# Patient Record
Sex: MALE | Race: BLACK OR AFRICAN AMERICAN | NOT HISPANIC OR LATINO | Employment: FULL TIME | URBAN - METROPOLITAN AREA
[De-identification: names, ages, dates, MRNs, and addresses within clinical notes are randomized per-mention and may not be internally consistent; named-entity substitution may affect disease eponyms.]

---

## 2017-06-08 ENCOUNTER — HOSPITAL ENCOUNTER (EMERGENCY)
Facility: HOSPITAL | Age: 42
Discharge: HOME | End: 2017-06-08
Attending: EMERGENCY MEDICINE

## 2017-06-08 VITALS
DIASTOLIC BLOOD PRESSURE: 59 MMHG | SYSTOLIC BLOOD PRESSURE: 114 MMHG | OXYGEN SATURATION: 99 % | TEMPERATURE: 98 F | BODY MASS INDEX: 25.15 KG/M2 | HEIGHT: 74 IN | WEIGHT: 196 LBS | HEART RATE: 80 BPM | RESPIRATION RATE: 20 BRPM

## 2017-06-08 DIAGNOSIS — R11.2 NON-INTRACTABLE VOMITING WITH NAUSEA, UNSPECIFIED VOMITING TYPE: Primary | ICD-10-CM

## 2017-06-08 LAB
ALBUMIN SERPL BCP-MCNC: 4.2 G/DL
ALP SERPL-CCNC: 102 U/L
ALT SERPL W/O P-5'-P-CCNC: 24 U/L
ANION GAP SERPL CALC-SCNC: 11 MMOL/L
AST SERPL-CCNC: 27 U/L
BASOPHILS # BLD AUTO: 0.02 K/UL
BASOPHILS NFR BLD: 0.2 %
BILIRUB SERPL-MCNC: 0.8 MG/DL
BUN SERPL-MCNC: 14 MG/DL
CALCIUM SERPL-MCNC: 9.2 MG/DL
CHLORIDE SERPL-SCNC: 109 MMOL/L
CO2 SERPL-SCNC: 24 MMOL/L
CREAT SERPL-MCNC: 1 MG/DL
DIFFERENTIAL METHOD: ABNORMAL
EOSINOPHIL # BLD AUTO: 0 K/UL
EOSINOPHIL NFR BLD: 0.3 %
ERYTHROCYTE [DISTWIDTH] IN BLOOD BY AUTOMATED COUNT: 13.4 %
EST. GFR  (AFRICAN AMERICAN): >60 ML/MIN/1.73 M^2
EST. GFR  (NON AFRICAN AMERICAN): >60 ML/MIN/1.73 M^2
ETHANOL SERPL-MCNC: <10 MG/DL
GLUCOSE SERPL-MCNC: 111 MG/DL
HCT VFR BLD AUTO: 42.4 %
HGB BLD-MCNC: 14.6 G/DL
LIPASE SERPL-CCNC: 13 U/L
LYMPHOCYTES # BLD AUTO: 1.5 K/UL
LYMPHOCYTES NFR BLD: 16.4 %
MCH RBC QN AUTO: 30.8 PG
MCHC RBC AUTO-ENTMCNC: 34.4 %
MCV RBC AUTO: 90 FL
MONOCYTES # BLD AUTO: 0.6 K/UL
MONOCYTES NFR BLD: 6.7 %
NEUTROPHILS # BLD AUTO: 6.8 K/UL
NEUTROPHILS NFR BLD: 76.4 %
PLATELET # BLD AUTO: 199 K/UL
PMV BLD AUTO: 9.5 FL
POTASSIUM SERPL-SCNC: 3.2 MMOL/L
PROT SERPL-MCNC: 7.6 G/DL
RBC # BLD AUTO: 4.74 M/UL
SODIUM SERPL-SCNC: 144 MMOL/L
WBC # BLD AUTO: 8.86 K/UL

## 2017-06-08 PROCEDURE — 96375 TX/PRO/DX INJ NEW DRUG ADDON: CPT

## 2017-06-08 PROCEDURE — 25000003 PHARM REV CODE 250: Performed by: EMERGENCY MEDICINE

## 2017-06-08 PROCEDURE — 63600175 PHARM REV CODE 636 W HCPCS: Performed by: EMERGENCY MEDICINE

## 2017-06-08 PROCEDURE — 80320 DRUG SCREEN QUANTALCOHOLS: CPT

## 2017-06-08 PROCEDURE — 96372 THER/PROPH/DIAG INJ SC/IM: CPT

## 2017-06-08 PROCEDURE — 96361 HYDRATE IV INFUSION ADD-ON: CPT

## 2017-06-08 PROCEDURE — 83690 ASSAY OF LIPASE: CPT

## 2017-06-08 PROCEDURE — 80053 COMPREHEN METABOLIC PANEL: CPT

## 2017-06-08 PROCEDURE — 99284 EMERGENCY DEPT VISIT MOD MDM: CPT | Mod: 25

## 2017-06-08 PROCEDURE — 85025 COMPLETE CBC W/AUTO DIFF WBC: CPT

## 2017-06-08 PROCEDURE — 96365 THER/PROPH/DIAG IV INF INIT: CPT

## 2017-06-08 RX ORDER — DICYCLOMINE HYDROCHLORIDE 10 MG/ML
20 INJECTION INTRAMUSCULAR
Status: COMPLETED | OUTPATIENT
Start: 2017-06-08 | End: 2017-06-08

## 2017-06-08 RX ORDER — HYDROMORPHONE HYDROCHLORIDE 2 MG/ML
1 INJECTION, SOLUTION INTRAMUSCULAR; INTRAVENOUS; SUBCUTANEOUS
Status: COMPLETED | OUTPATIENT
Start: 2017-06-08 | End: 2017-06-08

## 2017-06-08 RX ORDER — DICYCLOMINE HYDROCHLORIDE 20 MG/1
20 TABLET ORAL 2 TIMES DAILY PRN
Qty: 30 TABLET | Refills: 0 | Status: SHIPPED | OUTPATIENT
Start: 2017-06-08 | End: 2017-12-20

## 2017-06-08 RX ORDER — ONDANSETRON 2 MG/ML
8 INJECTION INTRAMUSCULAR; INTRAVENOUS
Status: COMPLETED | OUTPATIENT
Start: 2017-06-08 | End: 2017-06-08

## 2017-06-08 RX ORDER — PROMETHAZINE HYDROCHLORIDE 25 MG/1
25 TABLET ORAL EVERY 6 HOURS PRN
Qty: 15 TABLET | Refills: 0 | Status: SHIPPED | OUTPATIENT
Start: 2017-06-08 | End: 2017-12-20

## 2017-06-08 RX ORDER — SODIUM CHLORIDE 9 MG/ML
1000 INJECTION, SOLUTION INTRAVENOUS
Status: COMPLETED | OUTPATIENT
Start: 2017-06-08 | End: 2017-06-08

## 2017-06-08 RX ADMIN — PROMETHAZINE HYDROCHLORIDE 25 MG: 25 INJECTION INTRAMUSCULAR; INTRAVENOUS at 06:06

## 2017-06-08 RX ADMIN — ONDANSETRON 8 MG: 2 INJECTION INTRAMUSCULAR; INTRAVENOUS at 04:06

## 2017-06-08 RX ADMIN — DICYCLOMINE HYDROCHLORIDE 20 MG: 10 INJECTION INTRAMUSCULAR at 06:06

## 2017-06-08 RX ADMIN — SODIUM CHLORIDE 1000 ML: 0.9 INJECTION, SOLUTION INTRAVENOUS at 06:06

## 2017-06-08 RX ADMIN — HYDROMORPHONE HYDROCHLORIDE 1 MG: 2 INJECTION INTRAMUSCULAR; INTRAVENOUS; SUBCUTANEOUS at 06:06

## 2017-06-08 RX ADMIN — SODIUM CHLORIDE 1000 ML: 0.9 INJECTION, SOLUTION INTRAVENOUS at 04:06

## 2017-06-08 NOTE — ED PROVIDER NOTES
"Encounter Date: 6/8/2017       History     Chief Complaint   Patient presents with    Emesis     Pt reports vomiting twice in last 2 hours     Review of patient's allergies indicates:  No Known Allergies  Pt states, "I have viral gastroenteritis".  He vomited at least 15 times yesterday and more today.  Still vomiting in ED.  Complains of epigastric abdominal pain.        The history is provided by the patient.   Emesis    This is a new problem. The current episode started yesterday. The problem occurs constantly. The problem has been unchanged. The emesis has an appearance of stomach contents. Associated symptoms include abdominal pain (epigastric) and diarrhea (once). Pertinent negatives include no chills, no cough and no fever.     Past Medical History:   Diagnosis Date    Gastritis      History reviewed. No pertinent surgical history.  Family History   Problem Relation Age of Onset    Arthritis Mother     Hearing loss Father     Crohn's disease Sister      Social History   Substance Use Topics    Smoking status: Current Some Day Smoker     Packs/day: 0.25     Years: 3.00     Types: Cigars    Smokeless tobacco: Not on file    Alcohol use 0.6 oz/week     1 Cans of beer per week      Comment: one beer per month      Review of Systems   Constitutional: Negative for chills and fever.   HENT: Negative for rhinorrhea and sore throat.    Eyes: Negative for redness.   Respiratory: Negative for cough.    Cardiovascular: Negative for chest pain.   Gastrointestinal: Positive for abdominal pain (epigastric), diarrhea (once) and vomiting. Negative for nausea.   Genitourinary: Negative for dysuria.   Musculoskeletal: Negative for back pain.   Skin: Negative for color change.   Neurological: Negative for syncope and weakness.   Psychiatric/Behavioral: The patient is not nervous/anxious.        Physical Exam     Initial Vitals [06/08/17 0430]   BP Pulse Resp Temp SpO2   (!) 145/81 (!) 54 18 98.3 °F (36.8 °C) 98 % "     Physical Exam    Nursing note and vitals reviewed.  Constitutional: Vital signs are normal. He appears well-developed and well-nourished. He is active.  Non-toxic appearance. No distress.   Vomiting on exam   HENT:   Head: Normocephalic and atraumatic.   Eyes: EOM are normal.   Neck: Trachea normal. Neck supple.   Cardiovascular: Normal rate and regular rhythm.   Pulmonary/Chest: Breath sounds normal. No respiratory distress.   Abdominal: Soft. Normal appearance and bowel sounds are normal. He exhibits no distension. There is no tenderness.   Musculoskeletal: Normal range of motion. He exhibits no edema.   Neurological: He is alert.   Skin: Skin is warm, dry and intact.   Psychiatric: He has a normal mood and affect.         ED Course   Procedures  Labs Reviewed   CBC W/ AUTO DIFFERENTIAL - Abnormal; Notable for the following:        Result Value    Gran% 76.4 (*)     Lymph% 16.4 (*)     All other components within normal limits   COMPREHENSIVE METABOLIC PANEL - Abnormal; Notable for the following:     Potassium 3.2 (*)     Glucose 111 (*)     All other components within normal limits   LIPASE   ALCOHOL,MEDICAL (ETHANOL)             Medical Decision Making:   History:   Old Medical Records: I decided to obtain old medical records.  Clinical Tests:   Lab Tests: Reviewed and Ordered  ED Management:  This is an emergent evaluation.  The patient is a 41-year-old male who has been vomiting intractably since yesterday.  He appears very uncomfortable and is actively vomiting on exam.  He has epigastric pain without tenderness.  His vital signs are stable.  There is been no history of fever or chills.  He did have one episode of diarrhea.    An IV is established and he was treated with IV fluids and Zofran.    He was still vomiting and complaining of pain.  Therefore, we'll IV fluids, Dilaudid, Phenergan, and Bentyl were ordered.  The patient is stable at this time.    1641 (late entry)--the patient fell completely  recovered at the end of his stay.  He was found to be hypokalemic.  He can replace the diet.  There are no lab abnormalities otherwise.  He was discharged with prescription for Phenergan and Bentyl for his vomiting illness.                   ED Course     Clinical Impression:   The encounter diagnosis was Non-intractable vomiting with nausea, unspecified vomiting type.    Disposition:   Disposition: Discharged  Condition: Stable       Basim Bang MD  06/08/17 8386

## 2017-06-08 NOTE — ED NOTES
"Upon entering room, observed patient lying on left side, significant other at bedside, IV phenergan completed and stopped at his time.  Patient stated that this nausea was "a lil bit better", denies any pain or dicomfort at this time.  Will continue to monitor.  "

## 2017-06-08 NOTE — ED TRIAGE NOTES
Pt states that he started vomiting around 10 after drinking a cup of absolute vodka. Pt c/o of 5/10 epigastric abdominal pain, diarrhea. Pt denies HA, fever, blurred vision, chest pain.

## 2017-06-08 NOTE — ED NOTES
Report received from JETT Recio. Pt is laying on left side with spouse at bedside. Vital taken. Fluids infusing.

## 2017-12-20 ENCOUNTER — HOSPITAL ENCOUNTER (EMERGENCY)
Facility: HOSPITAL | Age: 42
Discharge: HOME | End: 2017-12-20
Attending: EMERGENCY MEDICINE

## 2017-12-20 VITALS
HEIGHT: 74 IN | BODY MASS INDEX: 25.67 KG/M2 | OXYGEN SATURATION: 96 % | DIASTOLIC BLOOD PRESSURE: 98 MMHG | RESPIRATION RATE: 18 BRPM | SYSTOLIC BLOOD PRESSURE: 160 MMHG | HEART RATE: 89 BPM | TEMPERATURE: 100 F | WEIGHT: 200 LBS

## 2017-12-20 DIAGNOSIS — R11.2 NAUSEA AND VOMITING, INTRACTABILITY OF VOMITING NOT SPECIFIED, UNSPECIFIED VOMITING TYPE: ICD-10-CM

## 2017-12-20 DIAGNOSIS — R10.9 ABDOMINAL PAIN, UNSPECIFIED ABDOMINAL LOCATION: Primary | ICD-10-CM

## 2017-12-20 LAB
ALBUMIN SERPL BCP-MCNC: 4.7 G/DL
ALP SERPL-CCNC: 115 U/L
ALT SERPL W/O P-5'-P-CCNC: 25 U/L
AMORPH CRY URNS QL MICRO: ABNORMAL
ANION GAP SERPL CALC-SCNC: 12 MMOL/L
AST SERPL-CCNC: 26 U/L
BACTERIA #/AREA URNS HPF: ABNORMAL /HPF
BASOPHILS # BLD AUTO: 0.02 K/UL
BASOPHILS NFR BLD: 0.2 %
BILIRUB SERPL-MCNC: 1 MG/DL
BILIRUB UR QL STRIP: NEGATIVE
BUN SERPL-MCNC: 22 MG/DL
CALCIUM SERPL-MCNC: 10.7 MG/DL
CHLORIDE SERPL-SCNC: 105 MMOL/L
CK SERPL-CCNC: 504 U/L
CLARITY UR: ABNORMAL
CO2 SERPL-SCNC: 26 MMOL/L
COLOR UR: ABNORMAL
CREAT SERPL-MCNC: 1.6 MG/DL
DIFFERENTIAL METHOD: ABNORMAL
EOSINOPHIL # BLD AUTO: 0 K/UL
EOSINOPHIL NFR BLD: 0.3 %
ERYTHROCYTE [DISTWIDTH] IN BLOOD BY AUTOMATED COUNT: 13.4 %
EST. GFR  (AFRICAN AMERICAN): >60 ML/MIN/1.73 M^2
EST. GFR  (NON AFRICAN AMERICAN): 53 ML/MIN/1.73 M^2
GLUCOSE SERPL-MCNC: 149 MG/DL
GLUCOSE UR QL STRIP: NEGATIVE
HCT VFR BLD AUTO: 50.3 %
HGB BLD-MCNC: 17.6 G/DL
HGB UR QL STRIP: ABNORMAL
HYALINE CASTS #/AREA URNS LPF: 2 /LPF
KETONES UR QL STRIP: ABNORMAL
LEUKOCYTE ESTERASE UR QL STRIP: NEGATIVE
LIPASE SERPL-CCNC: 49 U/L
LYMPHOCYTES # BLD AUTO: 1.6 K/UL
LYMPHOCYTES NFR BLD: 18.3 %
MCH RBC QN AUTO: 31.5 PG
MCHC RBC AUTO-ENTMCNC: 35 G/DL
MCV RBC AUTO: 90 FL
MICROSCOPIC COMMENT: ABNORMAL
MONOCYTES # BLD AUTO: 0.5 K/UL
MONOCYTES NFR BLD: 5.7 %
NEUTROPHILS # BLD AUTO: 6.6 K/UL
NEUTROPHILS NFR BLD: 75.5 %
NITRITE UR QL STRIP: NEGATIVE
PH UR STRIP: 5 [PH] (ref 5–8)
PLATELET # BLD AUTO: 215 K/UL
PMV BLD AUTO: 9.8 FL
POTASSIUM SERPL-SCNC: 3.1 MMOL/L
PROT SERPL-MCNC: 9.1 G/DL
PROT UR QL STRIP: ABNORMAL
RBC # BLD AUTO: 5.58 M/UL
RBC #/AREA URNS HPF: 2 /HPF (ref 0–4)
SODIUM SERPL-SCNC: 143 MMOL/L
SP GR UR STRIP: >1.03 (ref 1–1.03)
URN SPEC COLLECT METH UR: ABNORMAL
UROBILINOGEN UR STRIP-ACNC: ABNORMAL EU/DL
WBC # BLD AUTO: 8.72 K/UL
WBC #/AREA URNS HPF: 2 /HPF (ref 0–5)

## 2017-12-20 PROCEDURE — 96374 THER/PROPH/DIAG INJ IV PUSH: CPT

## 2017-12-20 PROCEDURE — 96361 HYDRATE IV INFUSION ADD-ON: CPT

## 2017-12-20 PROCEDURE — 83690 ASSAY OF LIPASE: CPT

## 2017-12-20 PROCEDURE — 85025 COMPLETE CBC W/AUTO DIFF WBC: CPT

## 2017-12-20 PROCEDURE — 81000 URINALYSIS NONAUTO W/SCOPE: CPT

## 2017-12-20 PROCEDURE — 96375 TX/PRO/DX INJ NEW DRUG ADDON: CPT

## 2017-12-20 PROCEDURE — 25000003 PHARM REV CODE 250: Performed by: NURSE PRACTITIONER

## 2017-12-20 PROCEDURE — 99285 EMERGENCY DEPT VISIT HI MDM: CPT | Mod: 25

## 2017-12-20 PROCEDURE — 80053 COMPREHEN METABOLIC PANEL: CPT

## 2017-12-20 PROCEDURE — 63600175 PHARM REV CODE 636 W HCPCS: Performed by: NURSE PRACTITIONER

## 2017-12-20 PROCEDURE — 82550 ASSAY OF CK (CPK): CPT

## 2017-12-20 RX ORDER — ONDANSETRON 2 MG/ML
4 INJECTION INTRAMUSCULAR; INTRAVENOUS
Status: COMPLETED | OUTPATIENT
Start: 2017-12-20 | End: 2017-12-20

## 2017-12-20 RX ORDER — PROMETHAZINE HYDROCHLORIDE 25 MG/1
25 SUPPOSITORY RECTAL EVERY 6 HOURS PRN
Qty: 10 SUPPOSITORY | Refills: 0 | Status: SHIPPED | OUTPATIENT
Start: 2017-12-20 | End: 2019-08-23

## 2017-12-20 RX ORDER — PROMETHAZINE HYDROCHLORIDE 25 MG/1
25 TABLET ORAL EVERY 6 HOURS PRN
Qty: 15 TABLET | Refills: 0 | Status: SHIPPED | OUTPATIENT
Start: 2017-12-20 | End: 2019-08-23

## 2017-12-20 RX ORDER — MORPHINE SULFATE 10 MG/ML
4 INJECTION INTRAMUSCULAR; INTRAVENOUS; SUBCUTANEOUS
Status: COMPLETED | OUTPATIENT
Start: 2017-12-20 | End: 2017-12-20

## 2017-12-20 RX ORDER — DIPHENHYDRAMINE HYDROCHLORIDE 50 MG/ML
25 INJECTION INTRAMUSCULAR; INTRAVENOUS
Status: COMPLETED | OUTPATIENT
Start: 2017-12-20 | End: 2017-12-20

## 2017-12-20 RX ORDER — HALOPERIDOL 5 MG/ML
5 INJECTION INTRAMUSCULAR
Status: COMPLETED | OUTPATIENT
Start: 2017-12-20 | End: 2017-12-20

## 2017-12-20 RX ORDER — PROCHLORPERAZINE MALEATE 10 MG
10 TABLET ORAL EVERY 6 HOURS PRN
Qty: 15 TABLET | Refills: 0 | Status: SHIPPED | OUTPATIENT
Start: 2017-12-20 | End: 2018-10-12 | Stop reason: SDUPTHER

## 2017-12-20 RX ORDER — PROCHLORPERAZINE EDISYLATE 5 MG/ML
10 INJECTION INTRAMUSCULAR; INTRAVENOUS ONCE
Status: COMPLETED | OUTPATIENT
Start: 2017-12-20 | End: 2017-12-20

## 2017-12-20 RX ADMIN — SODIUM CHLORIDE 1000 ML: 0.9 INJECTION, SOLUTION INTRAVENOUS at 11:12

## 2017-12-20 RX ADMIN — HALOPERIDOL LACTATE 5 MG: 5 INJECTION, SOLUTION INTRAMUSCULAR at 12:12

## 2017-12-20 RX ADMIN — ONDANSETRON 4 MG: 2 INJECTION INTRAMUSCULAR; INTRAVENOUS at 11:12

## 2017-12-20 RX ADMIN — PROCHLORPERAZINE EDISYLATE 10 MG: 5 INJECTION INTRAMUSCULAR; INTRAVENOUS at 02:12

## 2017-12-20 RX ADMIN — DIPHENHYDRAMINE HYDROCHLORIDE 25 MG: 50 INJECTION, SOLUTION INTRAMUSCULAR; INTRAVENOUS at 02:12

## 2017-12-20 RX ADMIN — MORPHINE SULFATE 4 MG: 10 INJECTION INTRAVENOUS at 02:12

## 2017-12-20 NOTE — ED PROVIDER NOTES
Encounter Date: 12/20/2017    SCRIBE #1 NOTE: I, Sue Melara, am scribing for, and in the presence of,  SIENNA Lin. I have scribed the following portions of the note - Other sections scribed: HPI and ROS.       History     Chief Complaint   Patient presents with    Abdominal Pain     epigastic pain since Friday. Nausea and emesis. No Diarrhea.      CC: Abdominal Pain    HPI: This 42 y.o male who has a past medical history of gastritis presents to the ED for an evaluation of acute, constant, 10/10 epigastric abdominal pain with associated nausea and intractable emesis that began 5 days ago.  Patient reports he attempted Tylenol PM, but reports it as inadequate.  Patient reports due to his symptoms, he has not been able to keep down any oral intake.  Patient denies fever, chills, diarrhea, chest pain, shortness of breath, or any other associated symptoms.  Patient reports being prescribed Protonix and Prilosec in the past, but reports he stopped taking them.      The history is provided by the patient. No  was used.     Review of patient's allergies indicates:  No Known Allergies  Past Medical History:   Diagnosis Date    Gastritis      History reviewed. No pertinent surgical history.  Family History   Problem Relation Age of Onset    Arthritis Mother     Hearing loss Father     Crohn's disease Sister      Social History   Substance Use Topics    Smoking status: Current Some Day Smoker     Packs/day: 0.25     Years: 3.00     Types: Cigars    Smokeless tobacco: Never Used    Alcohol use 0.6 oz/week     1 Cans of beer per week      Comment: one beer per month      Review of Systems   Constitutional: Negative for chills and fever.   HENT: Negative for congestion, ear pain, rhinorrhea and sore throat.    Eyes: Negative for pain and visual disturbance.   Respiratory: Negative for cough and shortness of breath.    Cardiovascular: Negative for chest pain.   Gastrointestinal: Positive for  abdominal pain, nausea and vomiting. Negative for diarrhea.   Genitourinary: Negative for dysuria.   Musculoskeletal: Negative for back pain and neck pain.   Skin: Negative for rash.   Neurological: Negative for headaches.       Physical Exam     Initial Vitals [12/20/17 1019]   BP Pulse Resp Temp SpO2   (!) 145/98 65 18 98.6 °F (37 °C) 98 %      MAP       113.67         Physical Exam    Nursing note and vitals reviewed.  Constitutional: Vital signs are normal. He appears well-developed and well-nourished. He is not diaphoretic. He is active and cooperative.  Non-toxic appearance. No distress.   Dry-heaving, very restless, appears uncomfortable   Eyes: Conjunctivae are normal. No scleral icterus.   Pulmonary/Chest: Effort normal.   Abdominal: Soft. Normal appearance. He exhibits no distension, no fluid wave and no mass. There is no hepatosplenomegaly. There is tenderness (upper abdomen, worst over epigastrium). There is no rigidity, no rebound, no guarding, no CVA tenderness, no tenderness at McBurney's point and negative Brambila's sign. No hernia.   Neurological: He is alert and oriented to person, place, and time.         ED Course   Procedures  Labs Reviewed   CBC W/ AUTO DIFFERENTIAL - Abnormal; Notable for the following:        Result Value    MCH 31.5 (*)     Gran% 75.5 (*)     All other components within normal limits   COMPREHENSIVE METABOLIC PANEL - Abnormal; Notable for the following:     Potassium 3.1 (*)     Glucose 149 (*)     BUN, Bld 22 (*)     Creatinine 1.6 (*)     Calcium 10.7 (*)     Total Protein 9.1 (*)     eGFR if non  53 (*)     All other components within normal limits   URINALYSIS - Abnormal; Notable for the following:     Appearance, UA Cloudy (*)     Specific Gravity, UA >1.030 (*)     Protein, UA 2+ (*)     Ketones, UA Trace (*)     Occult Blood UA 2+ (*)     Urobilinogen, UA 2.0-3.0 (*)     All other components within normal limits   URINALYSIS MICROSCOPIC - Abnormal;  Notable for the following:     Hyaline Casts, UA 2 (*)     All other components within normal limits   CK - Abnormal; Notable for the following:      (*)     All other components within normal limits   LIPASE   CK                Additional MDM:   Comments: This is an urgent evaluation of a 42-year-old male that presents to the emergency room with abdominal pain and nausea vomiting for approximately 6 days.  Patient reports a history of chronic gastritis; was previously on Protonix but taken off due to previous ADINA.  Patient reports intractable vomiting since last Friday; he admits to eating things that will typically trigger his gastritis.  He denies any bloody emesis, fevers, other GI or  symptoms.  He is obviously uncomfortable on exam, tossing and turning on exam table, try using into emesis bag.  His gastric secretions were noted to be yellow and clear.  He also has mild to moderate tenderness palpation over the epigastrium.  Brambila sign is negative.  He is afebrile with normal vital signs.  His differentials included: Hepatitis, pancreatitis, gastritis versus peptic ulcer disease, gallbladder disease, gastroparesis, cyclical vomiting.  I considered but doubt appendicitis, diverticulitis, colitis, bowel obstruction.  Labs notable for mild ADINA with a creatinine of 1.6 and BUN of 22 and CPK of 504.  There was no leukocytosis, transaminitis.  His Tbili and lipase were within normal limits.  Urine notable for 2+ protein, trace ketones, and 2+ occult blood though his CT renal study showed no evidence of nephrolithiasis or other acute abnormality to attribute to his symptoms today.  He received 2L of NS in the ED.  He required multiple trials of antiemetics, but was able to tolerate liquids after receiving compazine.  On reevaluation, the patient felt comfortable to be discharged.     Overall, I believe that his symptoms are most likely due to underlying gastritis versus peptic ulcer disease.  I recommended  that he start taking OTC H2 blocker such as Zantac or Pepcid to help control his symptoms.  I also recommended the diet and lifestyle changes that he needs to make for his gastritis, which he is well aware of.  To follow-up with his primary care doctor or gastroenterology referral for further evaluation of his symptoms.  Rx Compazine and Phenergan for supportive care.    Case discussed with attending, , and he is in agreement with plan. Stable for discharge and outpatient follow.  .          Scribe Attestation:   Scribe #1: I performed the above scribed service and the documentation accurately describes the services I performed. I attest to the accuracy of the note.    Attending Attestation:           Physician Attestation for Scribe:  Physician Attestation Statement for Scribe #1: I, Brina Tracey NP-C, reviewed documentation, as scribed by Sue Melara in my presence, and it is both accurate and complete.                 ED Course      Clinical Impression:   The primary encounter diagnosis was Abdominal pain, unspecified abdominal location. A diagnosis of Nausea and vomiting, intractability of vomiting not specified, unspecified vomiting type was also pertinent to this visit.    Disposition:   Disposition: Discharged  Condition: Stable                        Brina Tracey NP  12/20/17 7713

## 2017-12-20 NOTE — DISCHARGE INSTRUCTIONS
Please get plenty of rest and drink lots of water to stay well-hydrated.    Try to start with clear liquids, and if tolerated, you can graduate to solid foods.  Eat a bland diet; avoid eating spicy, greasy, and acidic foods (see handout).    Try to eat smaller, more frequent meals.    You are being prescribed 3 different medications to help control the vomiting.  Compazine and phenergan can both be taken orally.  If you continue to have intractable vomiting, you can use the phenergan suppository RECTALLY.  Compazine and Phenergan may cause drowsiness, so please use with caution.    You should also try taking Pepcid or Zantac which are both available over-the-counter to help reduce acid secretion in your stomach.  These medications to not harm your kidneys.    Follow up with your regular doctor or gastroenterology for further evaluation of your symptoms, if they persist.    Return to the ER for any new/worsening symptoms.

## 2018-10-12 ENCOUNTER — HOSPITAL ENCOUNTER (EMERGENCY)
Facility: HOSPITAL | Age: 43
Discharge: HOME | End: 2018-10-13
Attending: EMERGENCY MEDICINE

## 2018-10-12 VITALS
HEART RATE: 62 BPM | TEMPERATURE: 98 F | SYSTOLIC BLOOD PRESSURE: 100 MMHG | WEIGHT: 175 LBS | DIASTOLIC BLOOD PRESSURE: 60 MMHG | BODY MASS INDEX: 22.46 KG/M2 | HEIGHT: 74 IN | RESPIRATION RATE: 16 BRPM | OXYGEN SATURATION: 100 %

## 2018-10-12 DIAGNOSIS — K52.9 GASTROENTERITIS: Primary | ICD-10-CM

## 2018-10-12 LAB
ALBUMIN SERPL BCP-MCNC: 4.1 G/DL
ALBUMIN SERPL BCP-MCNC: 5 G/DL
ALP SERPL-CCNC: 116 U/L
ALP SERPL-CCNC: 142 U/L
ALT SERPL W/O P-5'-P-CCNC: 46 U/L
ALT SERPL W/O P-5'-P-CCNC: 56 U/L
ANION GAP SERPL CALC-SCNC: 12 MMOL/L
ANION GAP SERPL CALC-SCNC: 18 MMOL/L
AST SERPL-CCNC: 26 U/L
AST SERPL-CCNC: 29 U/L
BASOPHILS # BLD AUTO: 0.01 K/UL
BASOPHILS NFR BLD: 0.1 %
BILIRUB SERPL-MCNC: 0.7 MG/DL
BILIRUB SERPL-MCNC: 0.8 MG/DL
BUN SERPL-MCNC: 22 MG/DL
BUN SERPL-MCNC: 24 MG/DL
CALCIUM SERPL-MCNC: 11.1 MG/DL
CALCIUM SERPL-MCNC: 9.8 MG/DL
CHLORIDE SERPL-SCNC: 100 MMOL/L
CHLORIDE SERPL-SCNC: 103 MMOL/L
CO2 SERPL-SCNC: 25 MMOL/L
CO2 SERPL-SCNC: 25 MMOL/L
CREAT SERPL-MCNC: 2.6 MG/DL
CREAT SERPL-MCNC: 2.9 MG/DL
DIFFERENTIAL METHOD: ABNORMAL
EOSINOPHIL # BLD AUTO: 0 K/UL
EOSINOPHIL NFR BLD: 0.1 %
ERYTHROCYTE [DISTWIDTH] IN BLOOD BY AUTOMATED COUNT: 13.8 %
EST. GFR  (AFRICAN AMERICAN): 30 ML/MIN/1.73 M^2
EST. GFR  (AFRICAN AMERICAN): 34 ML/MIN/1.73 M^2
EST. GFR  (NON AFRICAN AMERICAN): 26 ML/MIN/1.73 M^2
EST. GFR  (NON AFRICAN AMERICAN): 29 ML/MIN/1.73 M^2
GLUCOSE SERPL-MCNC: 119 MG/DL
GLUCOSE SERPL-MCNC: 140 MG/DL
HCT VFR BLD AUTO: 48.3 %
HGB BLD-MCNC: 16.8 G/DL
LIPASE SERPL-CCNC: 15 U/L
LYMPHOCYTES # BLD AUTO: 1.5 K/UL
LYMPHOCYTES NFR BLD: 12.2 %
MCH RBC QN AUTO: 31.5 PG
MCHC RBC AUTO-ENTMCNC: 34.8 G/DL
MCV RBC AUTO: 90 FL
MONOCYTES # BLD AUTO: 1.4 K/UL
MONOCYTES NFR BLD: 11.9 %
NEUTROPHILS # BLD AUTO: 9.2 K/UL
NEUTROPHILS NFR BLD: 75.7 %
PLATELET # BLD AUTO: 276 K/UL
PMV BLD AUTO: 10 FL
POTASSIUM SERPL-SCNC: 3.4 MMOL/L
POTASSIUM SERPL-SCNC: 3.7 MMOL/L
PROT SERPL-MCNC: 7.9 G/DL
PROT SERPL-MCNC: 9.8 G/DL
RBC # BLD AUTO: 5.34 M/UL
SODIUM SERPL-SCNC: 140 MMOL/L
SODIUM SERPL-SCNC: 143 MMOL/L
WBC # BLD AUTO: 12.08 K/UL

## 2018-10-12 PROCEDURE — 80053 COMPREHEN METABOLIC PANEL: CPT

## 2018-10-12 PROCEDURE — 83690 ASSAY OF LIPASE: CPT

## 2018-10-12 PROCEDURE — 25000003 PHARM REV CODE 250: Performed by: NURSE PRACTITIONER

## 2018-10-12 PROCEDURE — 80053 COMPREHEN METABOLIC PANEL: CPT | Mod: 91

## 2018-10-12 PROCEDURE — 85025 COMPLETE CBC W/AUTO DIFF WBC: CPT

## 2018-10-12 PROCEDURE — 96366 THER/PROPH/DIAG IV INF ADDON: CPT

## 2018-10-12 PROCEDURE — 96361 HYDRATE IV INFUSION ADD-ON: CPT

## 2018-10-12 PROCEDURE — 99284 EMERGENCY DEPT VISIT MOD MDM: CPT | Mod: 25

## 2018-10-12 PROCEDURE — 25000003 PHARM REV CODE 250: Performed by: EMERGENCY MEDICINE

## 2018-10-12 PROCEDURE — 96365 THER/PROPH/DIAG IV INF INIT: CPT

## 2018-10-12 PROCEDURE — 63600175 PHARM REV CODE 636 W HCPCS: Performed by: EMERGENCY MEDICINE

## 2018-10-12 RX ORDER — SODIUM CHLORIDE 9 MG/ML
1000 INJECTION, SOLUTION INTRAVENOUS
Status: COMPLETED | OUTPATIENT
Start: 2018-10-12 | End: 2018-10-12

## 2018-10-12 RX ORDER — ONDANSETRON 4 MG/1
8 TABLET, FILM COATED ORAL EVERY 6 HOURS PRN
Qty: 12 TABLET | Refills: 0 | Status: SHIPPED | OUTPATIENT
Start: 2018-10-12 | End: 2019-08-23

## 2018-10-12 RX ORDER — DIPHENOXYLATE HYDROCHLORIDE AND ATROPINE SULFATE 2.5; .025 MG/1; MG/1
1 TABLET ORAL 4 TIMES DAILY PRN
Qty: 10 TABLET | Refills: 0 | Status: SHIPPED | OUTPATIENT
Start: 2018-10-12 | End: 2018-10-22

## 2018-10-12 RX ADMIN — SODIUM CHLORIDE 1000 ML: 0.9 INJECTION, SOLUTION INTRAVENOUS at 09:10

## 2018-10-12 RX ADMIN — SODIUM CHLORIDE 1000 ML: 0.9 INJECTION, SOLUTION INTRAVENOUS at 08:10

## 2018-10-12 RX ADMIN — PROMETHAZINE HYDROCHLORIDE 12.5 MG: 25 INJECTION INTRAMUSCULAR; INTRAVENOUS at 09:10

## 2018-10-13 NOTE — ED PROVIDER NOTES
"Encounter Date: 10/12/2018    This is a SORT/MSE of a 43 y.o. male presenting to the ED with c/o vomiting and upper abdominal pain. Care will be transferred to an alternate provider when patient is roomed for a full evaluation and final disposition. JORGE Mills, FNP-C 10/12/2018 7:58 PM    SCRIBE #1 NOTE: I, Ben Spivey, am scribing for, and in the presence of,  Pilo Mclean MD . I have scribed the entire note. Other sections scribed: HPI, ROS.       History     Chief Complaint   Patient presents with    Emesis     x 15 episodes today.     Abdominal Pain     x 2 days. Upper quadrant abd pain      CC: Emesis ; Abdominal Pain    This 43 y.o Male with viral gastritis presents to the ED c/o severe emesis and diarrhea x2 days. Pt states that this is similar to prior bouts of his gastritis, which is triggered by eating certain foods. He states that he currently feels dehydrated from frequently throwing up "countless times today" and from 4 episodes of diarrhea yesterday (no blood in stool). He notes that his vomit is mostly liquid from what he has been trying to eat and drink. He reports that he smokes marijuana, but that he hasn't in the last few months. He denies recent exposure to illness. He denies fever and chills. No other symptoms to report.       The history is provided by the patient. No  was used.     Review of patient's allergies indicates:  No Known Allergies  Past Medical History:   Diagnosis Date    Gastritis      No past surgical history on file.  Family History   Problem Relation Age of Onset    Arthritis Mother     Hearing loss Father     Crohn's disease Sister      Social History     Tobacco Use    Smoking status: Current Some Day Smoker     Packs/day: 0.25     Years: 3.00     Pack years: 0.75     Types: Cigars    Smokeless tobacco: Never Used   Substance Use Topics    Alcohol use: Yes     Alcohol/week: 0.6 oz     Types: 1 Cans of beer per week     Comment: one beer per " month     Drug use: No     Review of Systems   Constitutional: Negative.  Negative for chills and fever.   HENT: Negative.  Negative for sore throat.    Eyes: Negative.  Negative for redness.   Respiratory: Negative.  Negative for cough.    Cardiovascular: Negative.  Negative for chest pain.   Gastrointestinal: Positive for diarrhea and vomiting. Negative for abdominal pain and blood in stool.   Endocrine: Negative.    Genitourinary: Negative.  Negative for difficulty urinating and dysuria.   Musculoskeletal: Negative.  Negative for back pain and neck pain.   Skin: Negative.  Negative for rash.   Allergic/Immunologic: Negative.    Neurological: Negative.  Negative for headaches.   Hematological: Negative.    Psychiatric/Behavioral: Negative.    All other systems reviewed and are negative.      Physical Exam     Initial Vitals [10/12/18 1959]   BP Pulse Resp Temp SpO2   (!) 100/59 (!) 118 16 98.9 °F (37.2 °C) 97 %      MAP       --         Physical Exam    Nursing note and vitals reviewed.  Constitutional: He appears well-developed and well-nourished.   Nontoxic-appearing, with dry mucous membranes   HENT:   Head: Normocephalic and atraumatic.   Mouth/Throat: Oropharynx is clear and moist.   Eyes: EOM are normal. Pupils are equal, round, and reactive to light.   Neck: Normal range of motion. Neck supple.   Cardiovascular: Normal rate, regular rhythm, normal heart sounds and intact distal pulses.   Pulmonary/Chest: Effort normal and breath sounds normal.   Abdominal: Soft. Bowel sounds are normal. He exhibits no distension and no mass. There is no tenderness. There is no rebound and no guarding.   Musculoskeletal: Normal range of motion.   Neurological: He is alert and oriented to person, place, and time. He has normal strength and normal reflexes. GCS score is 15. GCS eye subscore is 4. GCS verbal subscore is 5. GCS motor subscore is 6.   Skin: Skin is warm and dry. Capillary refill takes less than 2 seconds.  "  Psychiatric: He has a normal mood and affect. His behavior is normal. Judgment and thought content normal.         ED Course   Procedures  Labs Reviewed - No data to display       Imaging Results    None          Medical Decision Making:   Initial Assessment:   CC: Emesis ; Abdominal Pain    This 43 y.o Male with viral gastritis presents to the ED c/o severe emesis and diarrhea x2 days. Pt states that this is similar to prior bouts of his gastritis, which is triggered by eating certain foods. He states that he currently feels dehydrated from frequently throwing up "countless times today" and from 4 episodes of diarrhea yesterday (no blood in stool). He notes that his vomit is mostly liquid from what he has been trying to eat and drink. He reports that he smokes marijuana, but that he hasn't in the last few months. He denies recent exposure to illness. He denies fever and chills. No other symptoms to report.     Differential Diagnosis:   Cyclical vomiting  Gastroenteritis  Clinical Tests:   Lab Tests: Ordered and Reviewed  The following lab test(s) were unremarkable: CBC and BMP  ED Management:  Pt reassessed after 2 L of IV and anti-nausea medication.  Pt states he feels better and is ready to be discharged home.  Creatine has improved to 2.6, K is NL.            Scribe Attestation:   Scribe #1: I performed the above scribed service and the documentation accurately describes the services I performed. I attest to the accuracy of the note.    Attending Attestation:           Physician Attestation for Scribe:  Physician Attestation Statement for Scribe #1: I, Pilo Mclean MD, reviewed documentation, as scribed by Ben Spivey in my presence, and it is both accurate and complete.                    Clinical Impression:   The encounter diagnosis was Gastroenteritis.      Disposition:   Disposition: Discharged  Condition: Stable                        Pilo Mclean MD  10/12/18 3221    "

## 2018-10-13 NOTE — ED TRIAGE NOTES
"Pt arrived to the ED due to n/v that has been going over for the past 2 days. Pt reports "not being able to keep anything down". Pt has a hx of gastritis.   "

## 2019-08-23 ENCOUNTER — HOSPITAL ENCOUNTER (OUTPATIENT)
Facility: HOSPITAL | Age: 44
Discharge: HOME | End: 2019-08-24
Attending: EMERGENCY MEDICINE | Admitting: EMERGENCY MEDICINE
Payer: MEDICAID

## 2019-08-23 DIAGNOSIS — R11.2 NAUSEA AND VOMITING: ICD-10-CM

## 2019-08-23 DIAGNOSIS — R74.8 ELEVATED CPK: ICD-10-CM

## 2019-08-23 DIAGNOSIS — R16.0 LIVER MASS: ICD-10-CM

## 2019-08-23 DIAGNOSIS — I49.9 ABNORMAL HEART RHYTHM: ICD-10-CM

## 2019-08-23 DIAGNOSIS — E87.6 HYPOKALEMIA: Primary | ICD-10-CM

## 2019-08-23 DIAGNOSIS — E86.0 DEHYDRATION: ICD-10-CM

## 2019-08-23 PROBLEM — F12.90 MARIJUANA USE: Status: ACTIVE | Noted: 2019-08-23

## 2019-08-23 PROBLEM — Z72.0 TOBACCO ABUSE: Status: ACTIVE | Noted: 2019-08-23

## 2019-08-23 LAB
ALBUMIN SERPL BCP-MCNC: 4.7 G/DL (ref 3.5–5.2)
ALP SERPL-CCNC: 102 U/L (ref 55–135)
ALT SERPL W/O P-5'-P-CCNC: 28 U/L (ref 10–44)
AMPHET+METHAMPHET UR QL: NEGATIVE
ANION GAP SERPL CALC-SCNC: 12 MMOL/L (ref 8–16)
AST SERPL-CCNC: 26 U/L (ref 10–40)
BACTERIA #/AREA URNS HPF: ABNORMAL /HPF
BARBITURATES UR QL SCN>200 NG/ML: NEGATIVE
BASOPHILS # BLD AUTO: 0.01 K/UL (ref 0–0.2)
BASOPHILS NFR BLD: 0.1 % (ref 0–1.9)
BENZODIAZ UR QL SCN>200 NG/ML: NEGATIVE
BILIRUB SERPL-MCNC: 0.7 MG/DL (ref 0.1–1)
BILIRUB UR QL STRIP: NEGATIVE
BUN SERPL-MCNC: 17 MG/DL (ref 6–20)
BZE UR QL SCN: NEGATIVE
CALCIUM SERPL-MCNC: 10.1 MG/DL (ref 8.7–10.5)
CANNABINOIDS UR QL SCN: NEGATIVE
CHLORIDE SERPL-SCNC: 98 MMOL/L (ref 95–110)
CK SERPL-CCNC: 245 U/L (ref 20–200)
CLARITY UR: ABNORMAL
CO2 SERPL-SCNC: 29 MMOL/L (ref 23–29)
COLOR UR: YELLOW
CREAT SERPL-MCNC: 1.3 MG/DL (ref 0.5–1.4)
CREAT UR-MCNC: >450 MG/DL (ref 23–375)
DIFFERENTIAL METHOD: ABNORMAL
EOSINOPHIL # BLD AUTO: 0 K/UL (ref 0–0.5)
EOSINOPHIL NFR BLD: 0.1 % (ref 0–8)
ERYTHROCYTE [DISTWIDTH] IN BLOOD BY AUTOMATED COUNT: 13.9 % (ref 11.5–14.5)
EST. GFR  (AFRICAN AMERICAN): >60 ML/MIN/1.73 M^2
EST. GFR  (NON AFRICAN AMERICAN): >60 ML/MIN/1.73 M^2
GLUCOSE SERPL-MCNC: 112 MG/DL (ref 70–110)
GLUCOSE UR QL STRIP: NEGATIVE
HCT VFR BLD AUTO: 45.6 % (ref 40–54)
HGB BLD-MCNC: 15.7 G/DL (ref 14–18)
HGB UR QL STRIP: ABNORMAL
HYALINE CASTS #/AREA URNS LPF: 0 /LPF
KETONES UR QL STRIP: NEGATIVE
LEUKOCYTE ESTERASE UR QL STRIP: NEGATIVE
LIPASE SERPL-CCNC: 9 U/L (ref 4–60)
LYMPHOCYTES # BLD AUTO: 1.3 K/UL (ref 1–4.8)
LYMPHOCYTES NFR BLD: 14.4 % (ref 18–48)
MCH RBC QN AUTO: 32 PG (ref 27–31)
MCHC RBC AUTO-ENTMCNC: 34.4 G/DL (ref 32–36)
MCV RBC AUTO: 93 FL (ref 82–98)
METHADONE UR QL SCN>300 NG/ML: NEGATIVE
MICROSCOPIC COMMENT: ABNORMAL
MONOCYTES # BLD AUTO: 1 K/UL (ref 0.3–1)
MONOCYTES NFR BLD: 11.4 % (ref 4–15)
NEUTROPHILS # BLD AUTO: 6.5 K/UL (ref 1.8–7.7)
NEUTROPHILS NFR BLD: 74.1 % (ref 38–73)
NITRITE UR QL STRIP: NEGATIVE
OPIATES UR QL SCN: NEGATIVE
PCP UR QL SCN>25 NG/ML: NEGATIVE
PH UR STRIP: 6 [PH] (ref 5–8)
PLATELET # BLD AUTO: 246 K/UL (ref 150–350)
PMV BLD AUTO: 10.1 FL (ref 9.2–12.9)
POTASSIUM SERPL-SCNC: 2.7 MMOL/L (ref 3.5–5.1)
PROT SERPL-MCNC: 8.6 G/DL (ref 6–8.4)
PROT UR QL STRIP: ABNORMAL
RBC # BLD AUTO: 4.91 M/UL (ref 4.6–6.2)
RBC #/AREA URNS HPF: 3 /HPF (ref 0–4)
SODIUM SERPL-SCNC: 139 MMOL/L (ref 136–145)
SP GR UR STRIP: 1.03 (ref 1–1.03)
SQUAMOUS #/AREA URNS HPF: 5 /HPF
TOXICOLOGY INFORMATION: ABNORMAL
URN SPEC COLLECT METH UR: ABNORMAL
UROBILINOGEN UR STRIP-ACNC: NEGATIVE EU/DL
WBC # BLD AUTO: 8.81 K/UL (ref 3.9–12.7)
WBC #/AREA URNS HPF: 5 /HPF (ref 0–5)
YEAST URNS QL MICRO: ABNORMAL

## 2019-08-23 PROCEDURE — G0378 HOSPITAL OBSERVATION PER HR: HCPCS

## 2019-08-23 PROCEDURE — 80053 COMPREHEN METABOLIC PANEL: CPT

## 2019-08-23 PROCEDURE — 85025 COMPLETE CBC W/AUTO DIFF WBC: CPT

## 2019-08-23 PROCEDURE — 83690 ASSAY OF LIPASE: CPT

## 2019-08-23 PROCEDURE — 63600175 PHARM REV CODE 636 W HCPCS: Performed by: PHYSICIAN ASSISTANT

## 2019-08-23 PROCEDURE — S0028 INJECTION, FAMOTIDINE, 20 MG: HCPCS | Performed by: PHYSICIAN ASSISTANT

## 2019-08-23 PROCEDURE — 93005 ELECTROCARDIOGRAM TRACING: CPT

## 2019-08-23 PROCEDURE — 93010 ELECTROCARDIOGRAM REPORT: CPT | Mod: ,,, | Performed by: INTERNAL MEDICINE

## 2019-08-23 PROCEDURE — 99285 EMERGENCY DEPT VISIT HI MDM: CPT | Mod: 25

## 2019-08-23 PROCEDURE — 82550 ASSAY OF CK (CPK): CPT

## 2019-08-23 PROCEDURE — 96375 TX/PRO/DX INJ NEW DRUG ADDON: CPT

## 2019-08-23 PROCEDURE — 81000 URINALYSIS NONAUTO W/SCOPE: CPT

## 2019-08-23 PROCEDURE — 93010 EKG 12-LEAD: ICD-10-PCS | Mod: ,,, | Performed by: INTERNAL MEDICINE

## 2019-08-23 PROCEDURE — 25000003 PHARM REV CODE 250: Performed by: PHYSICIAN ASSISTANT

## 2019-08-23 PROCEDURE — 96361 HYDRATE IV INFUSION ADD-ON: CPT

## 2019-08-23 PROCEDURE — 96376 TX/PRO/DX INJ SAME DRUG ADON: CPT

## 2019-08-23 PROCEDURE — 80307 DRUG TEST PRSMV CHEM ANLYZR: CPT

## 2019-08-23 PROCEDURE — 96365 THER/PROPH/DIAG IV INF INIT: CPT

## 2019-08-23 RX ORDER — METOCLOPRAMIDE HYDROCHLORIDE 5 MG/ML
10 INJECTION INTRAMUSCULAR; INTRAVENOUS EVERY 6 HOURS PRN
Status: DISCONTINUED | OUTPATIENT
Start: 2019-08-23 | End: 2019-08-24 | Stop reason: HOSPADM

## 2019-08-23 RX ORDER — FAMOTIDINE 10 MG/ML
20 INJECTION INTRAVENOUS EVERY 12 HOURS
Status: DISCONTINUED | OUTPATIENT
Start: 2019-08-23 | End: 2019-08-24 | Stop reason: HOSPADM

## 2019-08-23 RX ORDER — ONDANSETRON 2 MG/ML
8 INJECTION INTRAMUSCULAR; INTRAVENOUS
Status: COMPLETED | OUTPATIENT
Start: 2019-08-23 | End: 2019-08-23

## 2019-08-23 RX ORDER — ONDANSETRON 2 MG/ML
4 INJECTION INTRAMUSCULAR; INTRAVENOUS EVERY 8 HOURS PRN
Status: DISCONTINUED | OUTPATIENT
Start: 2019-08-23 | End: 2019-08-24 | Stop reason: HOSPADM

## 2019-08-23 RX ORDER — MORPHINE SULFATE 10 MG/ML
4 INJECTION INTRAMUSCULAR; INTRAVENOUS; SUBCUTANEOUS
Status: COMPLETED | OUTPATIENT
Start: 2019-08-23 | End: 2019-08-23

## 2019-08-23 RX ORDER — SODIUM CHLORIDE 9 MG/ML
INJECTION, SOLUTION INTRAVENOUS CONTINUOUS
Status: DISCONTINUED | OUTPATIENT
Start: 2019-08-23 | End: 2019-08-24

## 2019-08-23 RX ORDER — SODIUM CHLORIDE 0.9 % (FLUSH) 0.9 %
10 SYRINGE (ML) INJECTION
Status: DISCONTINUED | OUTPATIENT
Start: 2019-08-23 | End: 2019-08-24 | Stop reason: HOSPADM

## 2019-08-23 RX ORDER — HYDRALAZINE HYDROCHLORIDE 20 MG/ML
10 INJECTION INTRAMUSCULAR; INTRAVENOUS EVERY 8 HOURS PRN
Status: DISCONTINUED | OUTPATIENT
Start: 2019-08-23 | End: 2019-08-24 | Stop reason: HOSPADM

## 2019-08-23 RX ORDER — MORPHINE SULFATE 10 MG/ML
2 INJECTION INTRAMUSCULAR; INTRAVENOUS; SUBCUTANEOUS EVERY 4 HOURS PRN
Status: DISCONTINUED | OUTPATIENT
Start: 2019-08-23 | End: 2019-08-23

## 2019-08-23 RX ORDER — POTASSIUM CHLORIDE 20 MEQ/1
40 TABLET, EXTENDED RELEASE ORAL
Status: COMPLETED | OUTPATIENT
Start: 2019-08-23 | End: 2019-08-23

## 2019-08-23 RX ORDER — POTASSIUM CHLORIDE 20 MEQ/1
40 TABLET, EXTENDED RELEASE ORAL DAILY
Status: DISCONTINUED | OUTPATIENT
Start: 2019-08-24 | End: 2019-08-24

## 2019-08-23 RX ORDER — MORPHINE SULFATE 10 MG/ML
4 INJECTION INTRAMUSCULAR; INTRAVENOUS; SUBCUTANEOUS EVERY 4 HOURS PRN
Status: DISCONTINUED | OUTPATIENT
Start: 2019-08-23 | End: 2019-08-23

## 2019-08-23 RX ADMIN — METOCLOPRAMIDE 10 MG: 5 INJECTION, SOLUTION INTRAMUSCULAR; INTRAVENOUS at 10:08

## 2019-08-23 RX ADMIN — ONDANSETRON 8 MG: 2 INJECTION INTRAMUSCULAR; INTRAVENOUS at 03:08

## 2019-08-23 RX ADMIN — FAMOTIDINE 20 MG: 10 INJECTION INTRAVENOUS at 08:08

## 2019-08-23 RX ADMIN — SODIUM CHLORIDE 1000 ML: 0.9 INJECTION, SOLUTION INTRAVENOUS at 06:08

## 2019-08-23 RX ADMIN — HYDRALAZINE HYDROCHLORIDE 10 MG: 20 INJECTION INTRAMUSCULAR; INTRAVENOUS at 08:08

## 2019-08-23 RX ADMIN — SODIUM CHLORIDE 2000 ML: 0.9 INJECTION, SOLUTION INTRAVENOUS at 03:08

## 2019-08-23 RX ADMIN — POTASSIUM CHLORIDE 40 MEQ: 1500 TABLET, EXTENDED RELEASE ORAL at 05:08

## 2019-08-23 RX ADMIN — MORPHINE SULFATE 4 MG: 10 INJECTION INTRAVENOUS at 03:08

## 2019-08-23 RX ADMIN — PROMETHAZINE HYDROCHLORIDE 25 MG: 25 INJECTION INTRAMUSCULAR; INTRAVENOUS at 05:08

## 2019-08-23 RX ADMIN — MORPHINE SULFATE 2 MG: 10 INJECTION INTRAVENOUS at 08:08

## 2019-08-23 NOTE — ED PROVIDER NOTES
"Encounter Date: 8/23/2019    SCRIBE #1 NOTE: I, Barb Haji, am scribing for, and in the presence of,  Joni Hamilton PA-C. I have scribed the following portions of the note - Other sections scribed: HPI,ROS.       History     Chief Complaint   Patient presents with    Abdominal Pain     Patient reports abdominal discomfort, nausea, vomiting, diarrhea, and chills x 2 days.    Emesis    Diarrhea    Chills     CC: Abdominal pain    HPI:  This is a 43 y.o. male with a PMHx of HTN and gastritis who presents to the Emergency Department with a cc of upper abdominal pain that started two days ago. Pt reports he works [place] the sun in [employer] and he feels dehydrated. He reports associated nausea, about 30 episodes of vomiting since onset, diarrhea, subjective fever, cough, and rhinorrhea. Pt denies chest pain or trouble breathing. He notes his urine is "apple juice color." He denies any kidney problems in the past. Patient denies taking any medication for his symptoms. Patient reports prior history of similar symptoms. No known drug allergies.           Review of patient's allergies indicates:  No Known Allergies  Past Medical History:   Diagnosis Date    Gastritis     GERD (gastroesophageal reflux disease)     Hypertension      History reviewed. No pertinent surgical history.  Family History   Problem Relation Age of Onset    Arthritis Mother     Hearing loss Father     Crohn's disease Sister      Social History     Tobacco Use    Smoking status: Current Some Day Smoker     Packs/day: 0.25     Years: 3.00     Pack years: 0.75     Types: Cigars    Smokeless tobacco: Never Used   Substance Use Topics    Alcohol use: Yes     Alcohol/week: 0.6 oz     Types: 1 Cans of beer per week     Comment: one beer per month     Drug use: No     Review of Systems   Constitutional: Positive for fever.   HENT: Positive for rhinorrhea. Negative for sore throat.         (-)Trouble breathing   Respiratory: Positive for cough. " Negative for shortness of breath.    Cardiovascular: Negative for chest pain.   Gastrointestinal: Positive for abdominal pain, diarrhea, nausea and vomiting.   Genitourinary: Negative for dysuria.   Musculoskeletal: Negative for back pain.   Skin: Negative for rash.   Neurological: Negative for weakness.   Hematological: Does not bruise/bleed easily.       Physical Exam     Initial Vitals [08/23/19 1437]   BP Pulse Resp Temp SpO2   (!) 162/91 (!) 55 16 98.9 °F (37.2 °C) 100 %      MAP       --         Physical Exam    Nursing note and vitals reviewed.  Constitutional: He appears well-developed and well-nourished. He is not diaphoretic.   Appears uncomfortable   HENT:   Head: Normocephalic and atraumatic.   Nose: Nose normal.   Slightly dry mucous membranes   Eyes: Conjunctivae and EOM are normal. Pupils are equal, round, and reactive to light. Right eye exhibits no discharge. Left eye exhibits no discharge.   Neck: Normal range of motion. No tracheal deviation present. No JVD present.   Cardiovascular: Normal rate, regular rhythm and normal heart sounds. Exam reveals no friction rub.    No murmur heard.  Pulmonary/Chest: Breath sounds normal. No stridor. No respiratory distress. He has no wheezes. He has no rhonchi. He has no rales. He exhibits no tenderness.   Abdominal: Soft. He exhibits no distension. There is tenderness (Diffuse upper, most prominent to the epigastrium). There is no rigidity, no rebound, no guarding, no CVA tenderness, no tenderness at McBurney's point and negative Brambila's sign.   Musculoskeletal: Normal range of motion.   Neurological: He is alert and oriented to person, place, and time.   Skin: Skin is warm and dry. No rash and no abscess noted. No erythema. No pallor.         ED Course   Procedures  Labs Reviewed   CBC W/ AUTO DIFFERENTIAL - Abnormal; Notable for the following components:       Result Value    Mean Corpuscular Hemoglobin 32.0 (*)     Gran% 74.1 (*)     Lymph% 14.4 (*)      All other components within normal limits    Narrative:     For upper or mid abdominal pain.   COMPREHENSIVE METABOLIC PANEL - Abnormal; Notable for the following components:    Potassium 2.7 (*)     Glucose 112 (*)     Total Protein 8.6 (*)     All other components within normal limits    Narrative:     For upper or mid abdominal pain.   Potassium  critical result(s) called and verbal readback obtained   from Sujey Bobo , 08/23/2019 16:41   URINALYSIS, REFLEX TO URINE CULTURE - Abnormal; Notable for the following components:    Appearance, UA Hazy (*)     Protein, UA 3+ (*)     Occult Blood UA 3+ (*)     All other components within normal limits    Narrative:     In and Out Cath as needed it patient unable to void  Preferred Collection Type->Urine, Clean Catch   CK - Abnormal; Notable for the following components:     (*)     All other components within normal limits   URINALYSIS MICROSCOPIC - Abnormal; Notable for the following components:    Yeast, UA Few (*)     All other components within normal limits    Narrative:     In and Out Cath as needed it patient unable to void  Preferred Collection Type->Urine, Clean Catch   LIPASE    Narrative:     For upper or mid abdominal pain.   DRUG SCREEN PANEL, URINE EMERGENCY     EKG Readings: (Independently Interpreted)   Initial Reading: No STEMI. Rhythm: Sinus Bradycardia. Heart Rate: 59. Axis: Normal.       Imaging Results          US Abdomen Limited (Final result)  Result time 08/23/19 16:51:45    Final result by Abraham Machado MD (08/23/19 16:51:45)                 Impression:      No evidence for gallstones.  No evidence for biliary ductal dilatation.    Probable 2.0 cm hemangioma within the dome of the right lobe of the liver.      Electronically signed by: Abraham Machado MD  Date:    08/23/2019  Time:    16:51             Narrative:    EXAMINATION:  US ABDOMEN LIMITED    CLINICAL HISTORY:  epigastric abd pain;    TECHNIQUE:  Limited ultrasound of the  right upper quadrant of the abdomen focused on the biliary system was performed.    COMPARISON:  None    FINDINGS:  Gallbladder: No calculi, wall thickening, or pericholecystic fluid.  No sonographic Brambila's sign.    Biliary system: The common duct is not dilated, measuring 4 mm.  No intrahepatic ductal dilatation.    Pancreas: The visualized portions of pancreas appear normal.  There is mild prominence of the pancreatic duct which measures approximately 3 mm in diameter.    Miscellaneous: There is an echogenic mass within the dome of the right lobe of the liver measuring 1.4 x 1.7 x 2.0 cm in size.  This likely represents a hemangioma.  Note that a similar sized lesion was noted on the CT examination of the abdomen dated 01/31/2016 with imaging characteristics suggestive of a benign hepatic hemangioma.  There is no evidence for ascites.                                 Medical Decision Making:   History:   Old Medical Records: I decided to obtain old medical records.  Initial Assessment:   43-year-old male with upper abdominal pain and emesis  Independently Interpreted Test(s):   I have ordered and independently interpreted X-rays - see prior notes.  I have ordered and independently interpreted EKG Reading(s) - see prior notes  Clinical Tests:   Lab Tests: Ordered and Reviewed  Radiological Study: Ordered and Reviewed  Medical Tests: Ordered and Reviewed  ED Management:  Patient has hypokalemia of 2.7.  No acute kidney injury. No other catastrophic electrolyte/metabolic abnormalities today.  EKG normal. Incidental hemangioma of liver noted; discussed with patient.  No acute cholecystitis.  No pancreatitis.  I doubt occult cardiac and pulmonary etiology today.  Reports persistent nausea, despite 2 different types of antiemetics with 1 episode of emesis after attempting to replenish potassium in the ED.  Patient states he feels uncomfortable going home.  Will place in observation for antiemetics and potassium  replacement.  Patient agreeable plan. Reviewed with Wilfrido Moses PA-C. Accepting physician is Dr. Ann.   Other:   I have discussed this case with another health care provider.       <> Summary of the Discussion: Discussed with attending and hospital medicine.            Scribe Attestation:   Scribe #1: I performed the above scribed service and the documentation accurately describes the services I performed. I attest to the accuracy of the note.               Clinical Impression:       ICD-10-CM ICD-9-CM   1. Hypokalemia E87.6 276.8   2. Nausea and vomiting R11.2 787.01   3. Liver mass R16.0 573.9   4. Elevated CPK R74.8 790.5   5. Dehydration E86.0 276.51         Disposition:   Disposition: Placed in Observation       I, Joni Hamilton PA-C, personally performed the services described in this documentation. All medical record entries made by the scribe were at my direction and in my presence. I have reviewed the chart and agree that the record reflects my personal performance and is accurate and complete.                 Joni Hamilton PA-C  08/23/19 [id number]

## 2019-08-23 NOTE — ED TRIAGE NOTES
The pt states he is a  and works outside all day. Reports n/v/d that started yesterday. Denies fever at home.

## 2019-08-24 VITALS
HEIGHT: 74 IN | RESPIRATION RATE: 18 BRPM | TEMPERATURE: 99 F | DIASTOLIC BLOOD PRESSURE: 99 MMHG | HEART RATE: 54 BPM | OXYGEN SATURATION: 99 % | BODY MASS INDEX: 21.82 KG/M2 | WEIGHT: 170 LBS | SYSTOLIC BLOOD PRESSURE: 163 MMHG

## 2019-08-24 LAB
ALBUMIN SERPL BCP-MCNC: 4 G/DL (ref 3.5–5.2)
ALP SERPL-CCNC: 97 U/L (ref 55–135)
ALT SERPL W/O P-5'-P-CCNC: 23 U/L (ref 10–44)
ANION GAP SERPL CALC-SCNC: 11 MMOL/L (ref 8–16)
AST SERPL-CCNC: 21 U/L (ref 10–40)
BASOPHILS # BLD AUTO: 0.02 K/UL (ref 0–0.2)
BASOPHILS NFR BLD: 0.3 % (ref 0–1.9)
BILIRUB SERPL-MCNC: 0.4 MG/DL (ref 0.1–1)
BUN SERPL-MCNC: 10 MG/DL (ref 6–20)
CALCIUM SERPL-MCNC: 9.5 MG/DL (ref 8.7–10.5)
CHLORIDE SERPL-SCNC: 102 MMOL/L (ref 95–110)
CO2 SERPL-SCNC: 28 MMOL/L (ref 23–29)
CREAT SERPL-MCNC: 1 MG/DL (ref 0.5–1.4)
DIFFERENTIAL METHOD: NORMAL
EOSINOPHIL # BLD AUTO: 0 K/UL (ref 0–0.5)
EOSINOPHIL NFR BLD: 0 % (ref 0–8)
ERYTHROCYTE [DISTWIDTH] IN BLOOD BY AUTOMATED COUNT: 13.9 % (ref 11.5–14.5)
EST. GFR  (AFRICAN AMERICAN): >60 ML/MIN/1.73 M^2
EST. GFR  (NON AFRICAN AMERICAN): >60 ML/MIN/1.73 M^2
GLUCOSE SERPL-MCNC: 117 MG/DL (ref 70–110)
HCT VFR BLD AUTO: 45.2 % (ref 40–54)
HGB BLD-MCNC: 15 G/DL (ref 14–18)
LYMPHOCYTES # BLD AUTO: 1.7 K/UL (ref 1–4.8)
LYMPHOCYTES NFR BLD: 25.6 % (ref 18–48)
MAGNESIUM SERPL-MCNC: 1.8 MG/DL (ref 1.6–2.6)
MCH RBC QN AUTO: 30.9 PG (ref 27–31)
MCHC RBC AUTO-ENTMCNC: 33.2 G/DL (ref 32–36)
MCV RBC AUTO: 93 FL (ref 82–98)
MONOCYTES # BLD AUTO: 0.7 K/UL (ref 0.3–1)
MONOCYTES NFR BLD: 10.9 % (ref 4–15)
NEUTROPHILS # BLD AUTO: 4.3 K/UL (ref 1.8–7.7)
NEUTROPHILS NFR BLD: 63.3 % (ref 38–73)
PLATELET # BLD AUTO: 226 K/UL (ref 150–350)
PMV BLD AUTO: 9.9 FL (ref 9.2–12.9)
POTASSIUM SERPL-SCNC: 3.6 MMOL/L (ref 3.5–5.1)
PROT SERPL-MCNC: 7.6 G/DL (ref 6–8.4)
RBC # BLD AUTO: 4.86 M/UL (ref 4.6–6.2)
SODIUM SERPL-SCNC: 141 MMOL/L (ref 136–145)
WBC # BLD AUTO: 6.81 K/UL (ref 3.9–12.7)

## 2019-08-24 PROCEDURE — 36415 COLL VENOUS BLD VENIPUNCTURE: CPT

## 2019-08-24 PROCEDURE — 25000003 PHARM REV CODE 250: Performed by: INTERNAL MEDICINE

## 2019-08-24 PROCEDURE — 63600175 PHARM REV CODE 636 W HCPCS: Performed by: PHYSICIAN ASSISTANT

## 2019-08-24 PROCEDURE — 93005 ELECTROCARDIOGRAM TRACING: CPT

## 2019-08-24 PROCEDURE — 85025 COMPLETE CBC W/AUTO DIFF WBC: CPT

## 2019-08-24 PROCEDURE — 25000003 PHARM REV CODE 250: Performed by: NURSE PRACTITIONER

## 2019-08-24 PROCEDURE — 83735 ASSAY OF MAGNESIUM: CPT

## 2019-08-24 PROCEDURE — 93010 EKG 12-LEAD: ICD-10-PCS | Mod: ,,, | Performed by: INTERNAL MEDICINE

## 2019-08-24 PROCEDURE — S0028 INJECTION, FAMOTIDINE, 20 MG: HCPCS | Performed by: PHYSICIAN ASSISTANT

## 2019-08-24 PROCEDURE — 93010 ELECTROCARDIOGRAM REPORT: CPT | Mod: ,,, | Performed by: INTERNAL MEDICINE

## 2019-08-24 PROCEDURE — G0378 HOSPITAL OBSERVATION PER HR: HCPCS

## 2019-08-24 PROCEDURE — 96376 TX/PRO/DX INJ SAME DRUG ADON: CPT

## 2019-08-24 PROCEDURE — 80053 COMPREHEN METABOLIC PANEL: CPT

## 2019-08-24 PROCEDURE — 25000003 PHARM REV CODE 250: Performed by: PHYSICIAN ASSISTANT

## 2019-08-24 RX ORDER — AMLODIPINE BESYLATE 5 MG/1
5 TABLET ORAL DAILY
Qty: 30 TABLET | Refills: 11 | Status: SHIPPED | OUTPATIENT
Start: 2019-08-25 | End: 2019-08-24 | Stop reason: HOSPADM

## 2019-08-24 RX ORDER — ONDANSETRON 4 MG/1
4 TABLET, ORALLY DISINTEGRATING ORAL EVERY 6 HOURS PRN
Qty: 20 TABLET | Refills: 1 | Status: SHIPPED | OUTPATIENT
Start: 2019-08-24 | End: 2019-11-21 | Stop reason: ALTCHOICE

## 2019-08-24 RX ORDER — AMLODIPINE BESYLATE 5 MG/1
5 TABLET ORAL DAILY
Status: DISCONTINUED | OUTPATIENT
Start: 2019-08-24 | End: 2019-08-24 | Stop reason: HOSPADM

## 2019-08-24 RX ORDER — DICYCLOMINE HYDROCHLORIDE 10 MG/1
10 CAPSULE ORAL 4 TIMES DAILY
Status: DISCONTINUED | OUTPATIENT
Start: 2019-08-24 | End: 2019-08-24 | Stop reason: HOSPADM

## 2019-08-24 RX ORDER — AMLODIPINE BESYLATE 10 MG/1
10 TABLET ORAL DAILY
Qty: 30 TABLET | Refills: 3 | Status: ON HOLD | OUTPATIENT
Start: 2019-08-24 | End: 2020-08-23

## 2019-08-24 RX ADMIN — AMLODIPINE BESYLATE 5 MG: 5 TABLET ORAL at 08:08

## 2019-08-24 RX ADMIN — DICYCLOMINE HYDROCHLORIDE 10 MG: 10 CAPSULE ORAL at 04:08

## 2019-08-24 RX ADMIN — HYDRALAZINE HYDROCHLORIDE 10 MG: 20 INJECTION INTRAMUSCULAR; INTRAVENOUS at 08:08

## 2019-08-24 RX ADMIN — POTASSIUM CHLORIDE 40 MEQ: 1500 TABLET, EXTENDED RELEASE ORAL at 08:08

## 2019-08-24 RX ADMIN — METOCLOPRAMIDE 10 MG: 5 INJECTION, SOLUTION INTRAMUSCULAR; INTRAVENOUS at 04:08

## 2019-08-24 RX ADMIN — DICYCLOMINE HYDROCHLORIDE 10 MG: 10 CAPSULE ORAL at 01:08

## 2019-08-24 RX ADMIN — ONDANSETRON 4 MG: 2 INJECTION INTRAMUSCULAR; INTRAVENOUS at 08:08

## 2019-08-24 RX ADMIN — DICYCLOMINE HYDROCHLORIDE 10 MG: 10 CAPSULE ORAL at 08:08

## 2019-08-24 RX ADMIN — FAMOTIDINE 20 MG: 10 INJECTION INTRAVENOUS at 08:08

## 2019-08-24 NOTE — DISCHARGE SUMMARY
[facility] Hospital Medicine  Discharge Summary      Patient Name: Jayson Berkowitz  MRN: [id number]  Admission Date: 8/23/2019  Hospital Length of Stay: 0 days  Discharge Date and Time:  08/24/2019 1:40 PM  Attending Physician: Terence Ann MD   Discharging Provider: Benita Pack NP  Primary Care Provider: Primary Doctor No      HPI:   Jayson Berkowitz 43 y.o. male with HTN, Tobacco abuse, and marijuana abuse presents to the hospital with a chief complaint of N/V and abdominal pain. He reports for the last 2 days he has had intractable N/V. He denies any blood in the emesis and reports he has had similar presentations in the past. He reports previously it was attributed to MJ. He has also been experiencing an abdominal cramping located at the top of his stomach without radiation and without alleviating factors. He attributes this to the significant amounts of emesis he has had. He denies any sick contacts. He also denies eating of any raw or undercooked foods. He reports he had a few episodes of loose stool, but denies it being watery. He has not had any loose stool today. He denies fever, chest pain, syncope, hematemesis, melena, and hematochezia. He reports he smokes 1/2ppd of cigarettes. He also uses MOJO (synthetic marijuana). He is prescribed a BP medication, but does not take it.     In the ED, hypokalemia of 2.7, UDS negative, UA with occasional bacteria 5 WBC, US of abdomen without gallstones and no biliary dilation.     * No surgery found *      Hospital Course:   Jayson Berkowitz 43 y.o. male admitted to observation for nausea and vomiting due to synthetic marijuana. In the ED, hypokalemia of 2.7, UDS negative, UA with occasional bacteria 5 WBC, US of abdomen without gallstones and no biliary dilation. Hypokalemia resolved after replacement. On 8/24/19, no further vomiting. Nausea improved with hot showers twice. Blood pressure improved with start of amlodipine and hydralazine PRN.  Encourage patient compliance with amlodipine and stop stop smoking synthetic marijuana. Patient stable for discharge home.      Consults:     No new Assessment & Plan notes have been filed under this hospital service since the last note was generated.  Service: Hospital Medicine    Final Active Diagnoses:    Diagnosis Date Noted POA    PRINCIPAL PROBLEM:  Intractable vomiting with nausea [R11.2] 01/31/2016 Yes    Marijuana use [F12.90] 08/23/2019 Unknown    Tobacco abuse [Z72.0] 08/23/2019 Unknown    Abdominal pain [R10.9] 01/31/2016 Yes    Hypokalemia [E87.6] 04/21/2015 Yes      Problems Resolved During this Admission:       Discharged Condition: good    Disposition: Home or Self Care    Follow Up:  Follow-up Information     [facility]. Schedule an appointment as soon as possible for a visit in 1 week.    Why:  please call to arrange follow up appointment with primary care   Contact information:  [address] [place] [address]  [phone]                 Patient Instructions:      Diet Cardiac     Notify your health care provider if you experience any of the following:  temperature >100.4     Notify your health care provider if you experience any of the following:  persistent nausea and vomiting or diarrhea     Activity as tolerated       Significant Diagnostic Studies: Labs:   BMP:   Recent Labs   Lab 08/23/19  1530 08/24/19  0413   * 117*    141   K 2.7* 3.6   CL 98 102   CO2 29 28   BUN 17 10   CREATININE 1.3 1.0   CALCIUM 10.1 9.5   MG  --  1.8   , CMP   Recent Labs   Lab 08/23/19  1530 08/24/19  0413    141   K 2.7* 3.6   CL 98 102   CO2 29 28   * 117*   BUN 17 10   CREATININE 1.3 1.0   CALCIUM 10.1 9.5   PROT 8.6* 7.6   ALBUMIN 4.7 4.0   BILITOT 0.7 0.4   ALKPHOS 102 97   AST 26 21   ALT 28 23   ANIONGAP 12 11   ESTGFRAFRICA >60 >60   EGFRNONAA >60 >60   , CBC   Recent Labs   Lab 08/23/19  1530 08/24/19  0413   WBC 8.81 6.81   HGB 15.7 15.0   HCT 45.6 45.2     226   , INR No results found for: INR, PROTIME, Lipid Panel No results found for: CHOL, HDL, LDLCALC, TRIG, CHOLHDL, Troponin No results for input(s): TROPONINI in the last 168 hours., A1C: No results for input(s): HGBA1C in the last 4320 hours. and All labs within the past 24 hours have been reviewed    Pending Diagnostic Studies:     Procedure Component Value Units Date/Time    EKG 12-lead [[id number]] Collected:  08/24/19 0235    Order Status:  Sent Lab Status:  In process Updated:  08/24/19 1323    Narrative:       Test Reason : I49.9,    Vent. Rate : 054 BPM     Atrial Rate : 054 BPM     P-R Int : 000 ms          QRS Dur : 102 ms      QT Int : 446 ms       P-R-T Axes : 077 091 058 degrees     QTc Int : 422 ms    Sinus bradycardia with A-V dissociation and Junctional rhythm with   Sinus/atrial capture  Rightward axis  Nonspecific ST abnormality  Abnormal ECG  When compared with ECG of 23-AUG-2019 17:19,  Significant changes have occurred    Referred By: [email or web address]   SELF           Confirmed By:          Medications:  Reconciled Home Medications:      Medication List      START taking these medications    amLODIPine 10 MG tablet  Commonly known as:  NORVASC  Take 1 tablet (10 mg total) by mouth once daily.     ondansetron 4 MG Tbdl  Commonly known as:  ZOFRAN-ODT  Take 1 tablet (4 mg total) by mouth every 6 (six) hours as needed.            Indwelling Lines/Drains at time of discharge:   Lines/Drains/Airways          None          Time spent on the discharge of patient: 35 minutes  Patient was seen and examined on the date of discharge and determined to be suitable for discharge.         Benita Pack NP  Department of Hospital Medicine  [facility]

## 2019-08-24 NOTE — PROGRESS NOTES
[facility] CASE MANAGEMENT                  WRITTEN DISCHARGE INFORMATION      APPOINTMENTS AND RESOURCES TO HELP YOU MANAGE YOUR CARE AT HOME BASED ON YOUR PREFERENCES:  (If an appointment is not scheduled for you when you leave the hospital, call your doctor to schedule a follow up visit within a week)    Follow-up Information     [facility]. Schedule an appointment as soon as possible for a visit in 1 week.    Why:  please call to arrange follow up appointment with primary care   Contact information:  [address] [place] [address]  [phone]                   Healthy Living Instructions to HELP MANAGE YOUR CARE AT HOME:  Things You are responsible for:  1.    Getting your prescriptions filled   2.    Taking your medications as directed, DO NOT MISS ANY DOSES!  3.    Following the diet and exercise recommended by your doctor  4.    Going to your follow-up doctor appointment. This is important because it allows the doctor to monitor your progress and determine if any changes need to made to your treatment plan.  5. If you have any questions about MANAGING YOUR CARE AT HOME Call the Nurse Care Line for 24/7 Assistance [phone]       Please answer any calls you may receive from [facility]. We want to continue to support you as you manage your healthcare needs. [facility] is happy to have the opportunity to serve you.      Thank you for choosing [facility] for your healthcare needs!  Your [facility] Case Management Team,

## 2019-08-24 NOTE — NURSING
"Pt reported having 7/10 pain to abdomen described as "feel like someone is punching me in the stomach." Pt received prn IV Reglan at 2230 and pt unable to receive any doses until 0430. Dr. Moeller notified and order placed for prn bentyl to combat abdominal pain. Also, no need to placed cardiology consult. Will continue to monitor pt closely.   "

## 2019-08-24 NOTE — NURSING
PER handoff received from SHANKAR Nielson RN. Responds spontaneously and is AAO x4. Denies having any pain at current time and appears in no distress. Assessment completed per Doc Flow sheets by SHANKAR Nielson and agree with assessment; reference if needed. Fall and safety precautions maintained. Bed alarm activated and audible. Bed locked in lowest position, with side rails up x2. Call bell and personal items within reach. Will continue to monitor pt for any changes.

## 2019-08-24 NOTE — HOSPITAL COURSE
Jayson Berkowitz 43 y.o. male admitted to observation for nausea and vomiting due to synthetic marijuana. In the ED, hypokalemia of 2.7, UDS negative, UA with occasional bacteria 5 WBC, US of abdomen without gallstones and no biliary dilation. Hypokalemia resolved after replacement. On 8/24/19, no further vomiting. Nausea improved with hot showers twice. Blood pressure improved with start of amlodipine and hydralazine PRN. Encourage patient compliance with amlodipine and stop stop smoking synthetic marijuana. Patient stable for discharge home.

## 2019-08-24 NOTE — NURSING
Patient complaining of ABD pain 6/10.Educated on the importance of medication compliance. Verbalized understanding of importance of HTN meds

## 2019-08-24 NOTE — ASSESSMENT & PLAN NOTE
Greater than 3 minutes spent counseling patient on dangers of continued tobacco use. Will provide tobacco cessation educaiton

## 2019-08-24 NOTE — PLAN OF CARE
Problem: Adult Inpatient Plan of Care  Goal: Plan of Care Review     08/24/19 0628   Plan of Care Review   Plan of Care Reviewed With patient   Pt remained free of falls during current shift. Pt reported having pain to the abdomen and received PO bentyl and IV Reglan. IV fluids remained on hold throughout the shift d/t pt's SBP >170. Plan of care and fall precautions reviewed with pt and verbalized understanding. Bed locked, lowered, SR up x2 and call light placed within reach.

## 2019-08-24 NOTE — NURSING
Pt was on cardiac monitor, however no order present. While pt was being cardiac monitored it was revealed pt was having a 2nd degree AV block and a junction rhythm. Dr. Moeller notified and order placed for a EKG and continuous cardiac monitoring. Cardiac monitoring placed and awaiting results of EKG. Will update Dr. Moeller with EKG results and monitoring pt closely.

## 2019-08-24 NOTE — ASSESSMENT & PLAN NOTE
Has had previous presentations in the past attributed to MJ use. [facility] earlier this year. Likely the cause of patient's hypokalemia.   -IVF  -Anti-emetics  -Pain control

## 2019-08-24 NOTE — PLAN OF CARE
08/24/19 1116   Final Note   Assessment Type Final Discharge Note   Anticipated Discharge Disposition Home   Hospital Follow Up  Appt(s) scheduled? No   Discharge plans and expectations educations in teach back method with documentation complete? No       SW informed Nurse Nona pt was cleared from case management.

## 2019-08-24 NOTE — SUBJECTIVE & OBJECTIVE
Past Medical History:   Diagnosis Date    Gastritis     GERD (gastroesophageal reflux disease)     Hypertension        History reviewed. No pertinent surgical history.    Review of patient's allergies indicates:  No Known Allergies    No current facility-administered medications on file prior to encounter.      Current Outpatient Medications on File Prior to Encounter   Medication Sig    [DISCONTINUED] ondansetron (ZOFRAN) 4 MG tablet Take 2 tablets (8 mg total) by mouth every 6 (six) hours as needed for Nausea.    [DISCONTINUED] promethazine (PHENERGAN) 25 MG suppository Place 1 suppository (25 mg total) rectally every 6 (six) hours as needed for Nausea. May cause drowsiness    [DISCONTINUED] promethazine (PHENERGAN) 25 MG tablet Take 1 tablet (25 mg total) by mouth every 6 (six) hours as needed for Nausea. May cause drowsiness     Family History     Problem Relation (Age of Onset)    Arthritis Mother    Crohn's disease Sister    Hearing loss Father        Tobacco Use    Smoking status: Current Some Day Smoker     Packs/day: 0.25     Years: 3.00     Pack years: 0.75     Types: Cigars    Smokeless tobacco: Never Used   Substance and Sexual Activity    Alcohol use: Yes     Alcohol/week: 0.6 oz     Types: 1 Cans of beer per week     Comment: one beer per month     Drug use: No    Sexual activity: Yes     Partners: Female     Birth control/protection: None     Review of Systems   Constitutional: Negative for chills and fever.   HENT: Negative for nosebleeds and tinnitus.    Eyes: Negative for photophobia and visual disturbance.   Respiratory: Negative for shortness of breath and wheezing.    Cardiovascular: Negative for chest pain, palpitations and leg swelling.   Gastrointestinal: Positive for abdominal pain, diarrhea (described as loose stool), nausea and vomiting. Negative for abdominal distention.   Genitourinary: Negative for dysuria, flank pain and hematuria.   Musculoskeletal: Negative for gait  problem and joint swelling.   Skin: Negative for rash and wound.   Neurological: Negative for seizures and syncope.     Objective:     Vital Signs (Most Recent):  Temp: 99.7 °F (37.6 °C) (08/23/19 2029)  Pulse: (!) 59 (08/23/19 2029)  Resp: 18 (08/23/19 2029)  BP: (!) 198/97 (08/23/19 2029)  SpO2: 100 % (08/23/19 2029) Vital Signs (24h Range):  Temp:  [98.9 °F (37.2 °C)-99.7 °F (37.6 °C)] 99.7 °F (37.6 °C)  Pulse:  [55-60] 59  Resp:  [16-18] 18  SpO2:  [100 %] 100 %  BP: (162-198)/(91-97) 198/97     Weight: 77.1 kg (169 lb 15.6 oz)  Body mass index is 21.82 kg/m².    Physical Exam   Constitutional: He is oriented to person, place, and time. He appears well-developed and well-nourished. No distress.   HENT:   Head: Normocephalic and atraumatic.   Right Ear: External ear normal.   Left Ear: External ear normal.   Eyes: Conjunctivae and EOM are normal. Right eye exhibits no discharge. Left eye exhibits no discharge.   Neck: Normal range of motion. No thyromegaly present.   Cardiovascular: Normal rate and regular rhythm.   No murmur heard.  Pulmonary/Chest: Effort normal and breath sounds normal. No respiratory distress.   Abdominal: Soft. Bowel sounds are normal. He exhibits no distension and no mass. There is tenderness (epigastric).   Musculoskeletal: He exhibits no edema or deformity.   Neurological: He is alert and oriented to person, place, and time.   Skin: Skin is warm and dry.   Psychiatric: He has a normal mood and affect. His behavior is normal.   Nursing note and vitals reviewed.        CRANIAL NERVES     CN III, IV, VI   Extraocular motions are normal.        Significant Labs:   CBC:   Recent Labs   Lab 08/23/19  1530   WBC 8.81   HGB 15.7   HCT 45.6        CMP:   Recent Labs   Lab 08/23/19  1530      K 2.7*   CL 98   CO2 29   *   BUN 17   CREATININE 1.3   CALCIUM 10.1   PROT 8.6*   ALBUMIN 4.7   BILITOT 0.7   ALKPHOS 102   AST 26   ALT 28   ANIONGAP 12   EGFRNONAA >60     Urine Studies:    Recent Labs   Lab 08/23/19  1440   COLORU Yellow   APPEARANCEUA Hazy*   PHUR 6.0   SPECGRAV 1.030   PROTEINUA 3+*   GLUCUA Negative   KETONESU Negative   BILIRUBINUA Negative   OCCULTUA 3+*   NITRITE Negative   UROBILINOGEN Negative   LEUKOCYTESUR Negative   RBCUA 3   WBCUA 5   BACTERIA Occasional   SQUAMEPITHEL 5   HYALINECASTS 0       Significant Imaging:   Imaging Results          US Abdomen Limited (Final result)  Result time 08/23/19 16:51:45    Final result by Abraham Machado MD (08/23/19 16:51:45)                 Impression:      No evidence for gallstones.  No evidence for biliary ductal dilatation.    Probable 2.0 cm hemangioma within the dome of the right lobe of the liver.      Electronically signed by: Abraham Machado MD  Date:    08/23/2019  Time:    16:51             Narrative:    EXAMINATION:  US ABDOMEN LIMITED    CLINICAL HISTORY:  epigastric abd pain;    TECHNIQUE:  Limited ultrasound of the right upper quadrant of the abdomen focused on the biliary system was performed.    COMPARISON:  None    FINDINGS:  Gallbladder: No calculi, wall thickening, or pericholecystic fluid.  No sonographic Brambila's sign.    Biliary system: The common duct is not dilated, measuring 4 mm.  No intrahepatic ductal dilatation.    Pancreas: The visualized portions of pancreas appear normal.  There is mild prominence of the pancreatic duct which measures approximately 3 mm in diameter.    Miscellaneous: There is an echogenic mass within the dome of the right lobe of the liver measuring 1.4 x 1.7 x 2.0 cm in size.  This likely represents a hemangioma.  Note that a similar sized lesion was noted on the CT examination of the abdomen dated 01/31/2016 with imaging characteristics suggestive of a benign hepatic hemangioma.  There is no evidence for ascites.

## 2019-08-24 NOTE — HPI
Jayson Berkowitz 43 y.o. male with HTN, Tobacco abuse, and marijuana abuse presents to the hospital with a chief complaint of N/V and abdominal pain. He reports for the last 2 days he has had intractable N/V. He denies any blood in the emesis and reports he has had similar presentations in the past. He reports previously it was attributed to MJ. He has also been experiencing an abdominal cramping located at the top of his stomach without radiation and without alleviating factors. He attributes this to the significant amounts of emesis he has had. He denies any sick contacts. He also denies eating of any raw or undercooked foods. He reports he had a few episodes of loose stool, but denies it being watery. He has not had any loose stool today. He denies fever, chest pain, syncope, hematemesis, melena, and hematochezia. He reports he smokes 1/2ppd of cigarettes. He also uses MOJO (synthetic marijuana). He is prescribed a BP medication, but does not take it.     In the ED, hypokalemia of 2.7, UDS negative, UA with occasional bacteria 5 WBC, US of abdomen without gallstones and no biliary dilation.

## 2019-08-24 NOTE — H&P
[facility] Hospital Medicine  History & Physical    Patient Name: Jayson Berkowitz  MRN: [id number]  Admission Date: 8/23/2019  Attending Physician: Terence Ann MD   Primary Care Provider: Primary Doctor No         Patient information was obtained from patient, past medical records and ER records.     Subjective:     Principal Problem:Intractable vomiting with nausea    Chief Complaint:   Chief Complaint   Patient presents with    Abdominal Pain     Patient reports abdominal discomfort, nausea, vomiting, diarrhea, and chills x 2 days.    Emesis    Diarrhea    Chills        HPI: Jayson Berkowitz 43 y.o. male with HTN, Tobacco abuse, and marijuana abuse presents to the hospital with a chief complaint of N/V and abdominal pain. He reports for the last 2 days he has had intractable N/V. He denies any blood in the emesis and reports he has had similar presentations in the past. He reports previously it was attributed to MJ. He has also been experiencing an abdominal cramping located at the top of his stomach without radiation and without alleviating factors. He attributes this to the significant amounts of emesis he has had. He denies any sick contacts. He also denies eating of any raw or undercooked foods. He reports he had a few episodes of loose stool, but denies it being watery. He has not had any loose stool today. He denies fever, chest pain, syncope, hematemesis, melena, and hematochezia. He reports he smokes 1/2ppd of cigarettes. He also uses MOJO (synthetic marijuana). He is prescribed a BP medication, but does not take it.     In the ED, hypokalemia of 2.7, UDS negative, UA with occasional bacteria 5 WBC, US of abdomen without gallstones and no biliary dilation.     Past Medical History:   Diagnosis Date    Gastritis     GERD (gastroesophageal reflux disease)     Hypertension        History reviewed. No pertinent surgical history.    Review of patient's allergies indicates:  No Known  Allergies    No current facility-administered medications on file prior to encounter.      Current Outpatient Medications on File Prior to Encounter   Medication Sig    [DISCONTINUED] ondansetron (ZOFRAN) 4 MG tablet Take 2 tablets (8 mg total) by mouth every 6 (six) hours as needed for Nausea.    [DISCONTINUED] promethazine (PHENERGAN) 25 MG suppository Place 1 suppository (25 mg total) rectally every 6 (six) hours as needed for Nausea. May cause drowsiness    [DISCONTINUED] promethazine (PHENERGAN) 25 MG tablet Take 1 tablet (25 mg total) by mouth every 6 (six) hours as needed for Nausea. May cause drowsiness     Family History     Problem Relation (Age of Onset)    Arthritis Mother    Crohn's disease Sister    Hearing loss Father        Tobacco Use    Smoking status: Current Some Day Smoker     Packs/day: 0.25     Years: 3.00     Pack years: 0.75     Types: Cigars    Smokeless tobacco: Never Used   Substance and Sexual Activity    Alcohol use: Yes     Alcohol/week: 0.6 oz     Types: 1 Cans of beer per week     Comment: one beer per month     Drug use: No    Sexual activity: Yes     Partners: Female     Birth control/protection: None     Review of Systems   Constitutional: Negative for chills and fever.   HENT: Negative for nosebleeds and tinnitus.    Eyes: Negative for photophobia and visual disturbance.   Respiratory: Negative for shortness of breath and wheezing.    Cardiovascular: Negative for chest pain, palpitations and leg swelling.   Gastrointestinal: Positive for abdominal pain, diarrhea (described as loose stool), nausea and vomiting. Negative for abdominal distention.   Genitourinary: Negative for dysuria, flank pain and hematuria.   Musculoskeletal: Negative for gait problem and joint swelling.   Skin: Negative for rash and wound.   Neurological: Negative for seizures and syncope.     Objective:     Vital Signs (Most Recent):  Temp: 99.7 °F (37.6 °C) (08/23/19 2029)  Pulse: (!) 59 (08/23/19  2029)  Resp: 18 (08/23/19 2029)  BP: (!) 198/97 (08/23/19 2029)  SpO2: 100 % (08/23/19 2029) Vital Signs (24h Range):  Temp:  [98.9 °F (37.2 °C)-99.7 °F (37.6 °C)] 99.7 °F (37.6 °C)  Pulse:  [55-60] 59  Resp:  [16-18] 18  SpO2:  [100 %] 100 %  BP: (162-198)/(91-97) 198/97     Weight: 77.1 kg (169 lb 15.6 oz)  Body mass index is 21.82 kg/m².    Physical Exam   Constitutional: He is oriented to person, place, and time. He appears well-developed and well-nourished. No distress.   HENT:   Head: Normocephalic and atraumatic.   Right Ear: External ear normal.   Left Ear: External ear normal.   Eyes: Conjunctivae and EOM are normal. Right eye exhibits no discharge. Left eye exhibits no discharge.   Neck: Normal range of motion. No thyromegaly present.   Cardiovascular: Normal rate and regular rhythm.   No murmur heard.  Pulmonary/Chest: Effort normal and breath sounds normal. No respiratory distress.   Abdominal: Soft. Bowel sounds are normal. He exhibits no distension and no mass. There is tenderness (epigastric).   Musculoskeletal: He exhibits no edema or deformity.   Neurological: He is alert and oriented to person, place, and time.   Skin: Skin is warm and dry.   Psychiatric: He has a normal mood and affect. His behavior is normal.   Nursing note and vitals reviewed.        CRANIAL NERVES     CN III, IV, VI   Extraocular motions are normal.        Significant Labs:   CBC:   Recent Labs   Lab 08/23/19  1530   WBC 8.81   HGB 15.7   HCT 45.6        CMP:   Recent Labs   Lab 08/23/19  1530      K 2.7*   CL 98   CO2 29   *   BUN 17   CREATININE 1.3   CALCIUM 10.1   PROT 8.6*   ALBUMIN 4.7   BILITOT 0.7   ALKPHOS 102   AST 26   ALT 28   ANIONGAP 12   EGFRNONAA >60     Urine Studies:   Recent Labs   Lab 08/23/19  1440   COLORU Yellow   APPEARANCEUA Hazy*   PHUR 6.0   SPECGRAV 1.030   PROTEINUA 3+*   GLUCUA Negative   KETONESU Negative   BILIRUBINUA Negative   OCCULTUA 3+*   NITRITE Negative   UROBILINOGEN  Negative   LEUKOCYTESUR Negative   RBCUA 3   WBCUA 5   BACTERIA Occasional   SQUAMEPITHEL 5   HYALINECASTS 0       Significant Imaging:   Imaging Results          US Abdomen Limited (Final result)  Result time 08/23/19 16:51:45    Final result by Abraham Machado MD (08/23/19 16:51:45)                 Impression:      No evidence for gallstones.  No evidence for biliary ductal dilatation.    Probable 2.0 cm hemangioma within the dome of the right lobe of the liver.      Electronically signed by: Abraham Mcahado MD  Date:    08/23/2019  Time:    16:51             Narrative:    EXAMINATION:  US ABDOMEN LIMITED    CLINICAL HISTORY:  epigastric abd pain;    TECHNIQUE:  Limited ultrasound of the right upper quadrant of the abdomen focused on the biliary system was performed.    COMPARISON:  None    FINDINGS:  Gallbladder: No calculi, wall thickening, or pericholecystic fluid.  No sonographic Brambila's sign.    Biliary system: The common duct is not dilated, measuring 4 mm.  No intrahepatic ductal dilatation.    Pancreas: The visualized portions of pancreas appear normal.  There is mild prominence of the pancreatic duct which measures approximately 3 mm in diameter.    Miscellaneous: There is an echogenic mass within the dome of the right lobe of the liver measuring 1.4 x 1.7 x 2.0 cm in size.  This likely represents a hemangioma.  Note that a similar sized lesion was noted on the CT examination of the abdomen dated 01/31/2016 with imaging characteristics suggestive of a benign hepatic hemangioma.  There is no evidence for ascites.                                  Assessment/Plan:     * Intractable vomiting with nausea  Has had previous presentations in the past attributed to MJ use. [facility]. Likely the cause of patient's hypokalemia.   -IVF  -Anti-emetics  -Pain control    Tobacco abuse  Greater than 3 minutes spent counseling patient on dangers of continued tobacco use. Will provide  tobacco cessation educaiton    Marijuana use  Reports uses synthetic marijuana recreationally. Last used yesterday and day prior. Patient counseled on possible side effects, and how may relate to N/V.    Abdominal pain  Due to N/V.  PRN reglan available    Hypokalemia  Due to N/V. Repleted orally in ED. Will recheck and repeat in AM if necessary.      VTE Risk Mitigation (From admission, onward)        Ordered     IP VTE LOW RISK PATIENT  Once      08/23/19 2028     Place KRISTAL hose  Until discontinued      08/23/19 2028     Place sequential compression device  Until discontinued      08/23/19 2028        VTE: KRISTAL/SCD  Code: Full  Diet: NPO  Dispo: pending tolerating PO and improvement in potassium    Wilfrido Moses PA-C  Department of Hospital Medicine   [facility]

## 2019-08-24 NOTE — PLAN OF CARE
08/24/19 1110   Discharge Assessment   Assessment Type Discharge Planning Assessment   Confirmed/corrected address and phone number on facesheet? Yes   Assessment information obtained from? Patient   Prior to hospitilization cognitive status: Alert/Oriented   Prior to hospitalization functional status: Independent   Current cognitive status: Alert/Oriented   Current Functional Status: Independent   Facility Arrived From: home   Lives With significant other   Able to Return to Prior Arrangements yes   Who are your caregiver(s) and their phone number(s[phone]   Patient's perception of discharge disposition home or selfcare   Readmission Within the Last 30 Days no previous admission in last 30 days   Patient currently being followed by outpatient case management? No   Equipment Currently Used at Home none   Do you have any problems affording any of your prescribed medications? No   Does the patient have transportation home? Yes   Transportation Anticipated family or friend will provide   Dialysis Name and Scheduled days N/A   Discharge Plan A Home with family   DME Needed Upon Discharge  none   Patient/Family in Agreement with Plan yes         [id number] - [place] - [address]  [place] [address]  Phone: [phone] Fax: [phone]

## 2019-08-24 NOTE — NURSING
Discharge orders received.  IV discontinued without complaint, catheter intact.  Telemetry monitor discontinued.    Discharge instructions explained and given to patient.  Questions and concerns addressed.  Coupons given to amlodipine + ondansetron.   Emotional support provided.     Ambulates without difficulty.  No falls or harm noted during stay.

## 2019-08-24 NOTE — ASSESSMENT & PLAN NOTE
Reports uses synthetic marijuana recreationally. Last used yesterday and day prior. Patient counseled on possible side effects, and how may relate to N/V.

## 2019-10-11 ENCOUNTER — HOSPITAL ENCOUNTER (EMERGENCY)
Facility: HOSPITAL | Age: 44
Discharge: HOME | End: 2019-10-11
Attending: EMERGENCY MEDICINE
Payer: MEDICAID

## 2019-10-11 VITALS
OXYGEN SATURATION: 100 % | HEART RATE: 64 BPM | HEIGHT: 74 IN | RESPIRATION RATE: 18 BRPM | TEMPERATURE: 100 F | WEIGHT: 170 LBS | DIASTOLIC BLOOD PRESSURE: 98 MMHG | BODY MASS INDEX: 21.82 KG/M2 | SYSTOLIC BLOOD PRESSURE: 185 MMHG

## 2019-10-11 DIAGNOSIS — R11.2 NON-INTRACTABLE VOMITING WITH NAUSEA, UNSPECIFIED VOMITING TYPE: Primary | ICD-10-CM

## 2019-10-11 DIAGNOSIS — N17.9 AKI (ACUTE KIDNEY INJURY): ICD-10-CM

## 2019-10-11 DIAGNOSIS — E87.6 HYPOKALEMIA: ICD-10-CM

## 2019-10-11 DIAGNOSIS — E86.0 DEHYDRATION: ICD-10-CM

## 2019-10-11 LAB
ALBUMIN SERPL BCP-MCNC: 5.3 G/DL (ref 3.5–5.2)
ALP SERPL-CCNC: 117 U/L (ref 55–135)
ALT SERPL W/O P-5'-P-CCNC: 16 U/L (ref 10–44)
ANION GAP SERPL CALC-SCNC: 14 MMOL/L (ref 8–16)
ANION GAP SERPL CALC-SCNC: 18 MMOL/L (ref 8–16)
AST SERPL-CCNC: 20 U/L (ref 10–40)
BACTERIA #/AREA URNS HPF: ABNORMAL /HPF
BASOPHILS # BLD AUTO: 0.04 K/UL (ref 0–0.2)
BASOPHILS NFR BLD: 0.4 % (ref 0–1.9)
BILIRUB SERPL-MCNC: 0.7 MG/DL (ref 0.1–1)
BILIRUB UR QL STRIP: NEGATIVE
BUN SERPL-MCNC: 24 MG/DL (ref 6–20)
BUN SERPL-MCNC: 24 MG/DL (ref 6–30)
CALCIUM SERPL-MCNC: 10.6 MG/DL (ref 8.7–10.5)
CHLORIDE SERPL-SCNC: 104 MMOL/L (ref 95–110)
CHLORIDE SERPL-SCNC: 105 MMOL/L (ref 95–110)
CK SERPL-CCNC: 576 U/L (ref 20–200)
CK SERPL-CCNC: 587 U/L (ref 20–200)
CLARITY UR: ABNORMAL
CO2 SERPL-SCNC: 25 MMOL/L (ref 23–29)
COLOR UR: ABNORMAL
CREAT SERPL-MCNC: 1.5 MG/DL (ref 0.5–1.4)
CREAT SERPL-MCNC: 1.8 MG/DL (ref 0.5–1.4)
DIFFERENTIAL METHOD: ABNORMAL
EOSINOPHIL # BLD AUTO: 0 K/UL (ref 0–0.5)
EOSINOPHIL NFR BLD: 0.1 % (ref 0–8)
ERYTHROCYTE [DISTWIDTH] IN BLOOD BY AUTOMATED COUNT: 13.8 % (ref 11.5–14.5)
EST. GFR  (AFRICAN AMERICAN): 52 ML/MIN/1.73 M^2
EST. GFR  (NON AFRICAN AMERICAN): 45 ML/MIN/1.73 M^2
GLUCOSE SERPL-MCNC: 120 MG/DL (ref 70–110)
GLUCOSE SERPL-MCNC: 125 MG/DL (ref 70–110)
GLUCOSE UR QL STRIP: NEGATIVE
GRAN CASTS #/AREA URNS LPF: 10 /LPF
HCT VFR BLD AUTO: 46.9 % (ref 40–54)
HCT VFR BLD CALC: 44 %PCV (ref 36–54)
HGB BLD-MCNC: 15.7 G/DL (ref 14–18)
HGB UR QL STRIP: ABNORMAL
HYALINE CASTS #/AREA URNS LPF: 25 /LPF
IMM GRANULOCYTES # BLD AUTO: 0.03 K/UL (ref 0–0.04)
IMM GRANULOCYTES NFR BLD AUTO: 0.3 % (ref 0–0.5)
KETONES UR QL STRIP: ABNORMAL
LEUKOCYTE ESTERASE UR QL STRIP: NEGATIVE
LIPASE SERPL-CCNC: 30 U/L (ref 4–60)
LYMPHOCYTES # BLD AUTO: 1.5 K/UL (ref 1–4.8)
LYMPHOCYTES NFR BLD: 16.4 % (ref 18–48)
MAGNESIUM SERPL-MCNC: 2.1 MG/DL (ref 1.6–2.6)
MCH RBC QN AUTO: 30.5 PG (ref 27–31)
MCHC RBC AUTO-ENTMCNC: 33.5 G/DL (ref 32–36)
MCV RBC AUTO: 91 FL (ref 82–98)
MICROSCOPIC COMMENT: ABNORMAL
MONOCYTES # BLD AUTO: 0.7 K/UL (ref 0.3–1)
MONOCYTES NFR BLD: 7.7 % (ref 4–15)
NEUTROPHILS # BLD AUTO: 6.9 K/UL (ref 1.8–7.7)
NEUTROPHILS NFR BLD: 75.1 % (ref 38–73)
NITRITE UR QL STRIP: NEGATIVE
NRBC BLD-RTO: 0 /100 WBC
PH UR STRIP: 5 [PH] (ref 5–8)
PLATELET # BLD AUTO: 257 K/UL (ref 150–350)
PMV BLD AUTO: 9.5 FL (ref 9.2–12.9)
POC IONIZED CALCIUM: 1.18 MMOL/L (ref 1.06–1.42)
POC TCO2 (MEASURED): 26 MMOL/L (ref 23–29)
POTASSIUM BLD-SCNC: 3.2 MMOL/L (ref 3.5–5.1)
POTASSIUM SERPL-SCNC: 2.9 MMOL/L (ref 3.5–5.1)
PROT SERPL-MCNC: 9.3 G/DL (ref 6–8.4)
PROT UR QL STRIP: ABNORMAL
RBC # BLD AUTO: 5.15 M/UL (ref 4.6–6.2)
RBC #/AREA URNS HPF: 10 /HPF (ref 0–4)
SAMPLE: ABNORMAL
SODIUM BLD-SCNC: 145 MMOL/L (ref 136–145)
SODIUM SERPL-SCNC: 143 MMOL/L (ref 136–145)
SP GR UR STRIP: 1.02 (ref 1–1.03)
URN SPEC COLLECT METH UR: ABNORMAL
UROBILINOGEN UR STRIP-ACNC: NEGATIVE EU/DL
WBC # BLD AUTO: 9.17 K/UL (ref 3.9–12.7)
WBC #/AREA URNS HPF: 4 /HPF (ref 0–5)

## 2019-10-11 PROCEDURE — 83735 ASSAY OF MAGNESIUM: CPT

## 2019-10-11 PROCEDURE — 25000003 PHARM REV CODE 250: Performed by: EMERGENCY MEDICINE

## 2019-10-11 PROCEDURE — 80053 COMPREHEN METABOLIC PANEL: CPT

## 2019-10-11 PROCEDURE — 96367 TX/PROPH/DG ADDL SEQ IV INF: CPT

## 2019-10-11 PROCEDURE — 84132 ASSAY OF SERUM POTASSIUM: CPT | Mod: 91

## 2019-10-11 PROCEDURE — 99900035 HC TECH TIME PER 15 MIN (STAT)

## 2019-10-11 PROCEDURE — 82565 ASSAY OF CREATININE: CPT | Mod: 91

## 2019-10-11 PROCEDURE — 96361 HYDRATE IV INFUSION ADD-ON: CPT

## 2019-10-11 PROCEDURE — 85014 HEMATOCRIT: CPT | Mod: 91

## 2019-10-11 PROCEDURE — 63600175 PHARM REV CODE 636 W HCPCS: Performed by: EMERGENCY MEDICINE

## 2019-10-11 PROCEDURE — 82330 ASSAY OF CALCIUM: CPT

## 2019-10-11 PROCEDURE — 85025 COMPLETE CBC W/AUTO DIFF WBC: CPT

## 2019-10-11 PROCEDURE — 83690 ASSAY OF LIPASE: CPT

## 2019-10-11 PROCEDURE — 94761 N-INVAS EAR/PLS OXIMETRY MLT: CPT

## 2019-10-11 PROCEDURE — 96375 TX/PRO/DX INJ NEW DRUG ADDON: CPT

## 2019-10-11 PROCEDURE — 96365 THER/PROPH/DIAG IV INF INIT: CPT

## 2019-10-11 PROCEDURE — 96366 THER/PROPH/DIAG IV INF ADDON: CPT

## 2019-10-11 PROCEDURE — 99284 EMERGENCY DEPT VISIT MOD MDM: CPT | Mod: 25

## 2019-10-11 PROCEDURE — 81000 URINALYSIS NONAUTO W/SCOPE: CPT

## 2019-10-11 PROCEDURE — 84295 ASSAY OF SERUM SODIUM: CPT

## 2019-10-11 PROCEDURE — 82550 ASSAY OF CK (CPK): CPT

## 2019-10-11 RX ORDER — ONDANSETRON 2 MG/ML
8 INJECTION INTRAMUSCULAR; INTRAVENOUS
Status: COMPLETED | OUTPATIENT
Start: 2019-10-11 | End: 2019-10-11

## 2019-10-11 RX ORDER — POTASSIUM CHLORIDE 7.45 MG/ML
10 INJECTION INTRAVENOUS
Status: COMPLETED | OUTPATIENT
Start: 2019-10-11 | End: 2019-10-11

## 2019-10-11 RX ORDER — PROMETHAZINE HYDROCHLORIDE 25 MG/1
25 TABLET ORAL EVERY 6 HOURS PRN
Qty: 10 TABLET | Refills: 0 | Status: ON HOLD | OUTPATIENT
Start: 2019-10-11 | End: 2019-10-14 | Stop reason: HOSPADM

## 2019-10-11 RX ORDER — POTASSIUM CHLORIDE 1500 MG/1
20 TABLET, EXTENDED RELEASE ORAL DAILY
Qty: 4 TABLET | Refills: 0 | Status: SHIPPED | OUTPATIENT
Start: 2019-10-12 | End: 2019-10-16

## 2019-10-11 RX ORDER — PROMETHAZINE HYDROCHLORIDE 25 MG/1
25 SUPPOSITORY RECTAL EVERY 6 HOURS PRN
Qty: 10 SUPPOSITORY | Refills: 0 | Status: SHIPPED | OUTPATIENT
Start: 2019-10-11 | End: 2019-11-21 | Stop reason: ALTCHOICE

## 2019-10-11 RX ORDER — POTASSIUM CHLORIDE 20 MEQ/1
40 TABLET, EXTENDED RELEASE ORAL
Status: COMPLETED | OUTPATIENT
Start: 2019-10-11 | End: 2019-10-11

## 2019-10-11 RX ADMIN — SODIUM CHLORIDE 2000 ML: 0.9 INJECTION, SOLUTION INTRAVENOUS at 11:10

## 2019-10-11 RX ADMIN — POTASSIUM CHLORIDE 40 MEQ: 1500 TABLET, EXTENDED RELEASE ORAL at 01:10

## 2019-10-11 RX ADMIN — ONDANSETRON HYDROCHLORIDE 8 MG: 2 SOLUTION INTRAMUSCULAR; INTRAVENOUS at 11:10

## 2019-10-11 RX ADMIN — PROMETHAZINE HYDROCHLORIDE 25 MG: 25 INJECTION INTRAMUSCULAR; INTRAVENOUS at 12:10

## 2019-10-11 RX ADMIN — POTASSIUM CHLORIDE 10 MEQ: 7.46 INJECTION, SOLUTION INTRAVENOUS at 01:10

## 2019-10-11 NOTE — ED PROVIDER NOTES
Encounter Date: 10/11/2019    SCRIBE #1 NOTE: I, Emily Plascencia, am scribing for, and in the presence of,  Angelita Ordonez MD. I have scribed the following portions of the note - Other sections scribed: HPI, ROS.       History     Chief Complaint   Patient presents with    Emesis     Pt reports abd pain and NV x20-25 times since yesterday. Pt also reports diarrhea. pain is localized to lower quads.      This is a 44 y.o. male with a PMHx of hypertension who presents to the ED complaining of nausea and vomiting that started yesterday. The patient reports that he thinks he may be dehydrated due to working outside and not drinking any water. The patient notes associated cramping abdominal pain yesterday that has resolved. The patient reports that he has experienced these symptoms before. The patient states that his last bowel movement was yesterday. The patient denies taking any medication for his symptoms. The patient denies alcohol and mariajuana use, but notes drug use (mojo- last use yesterday). The patient denies fever, diarrhea, dysuria, and difficulty urinating. No other associated symptoms. No alleviating factors.    The history is provided by the patient. No  was used.     Review of patient's allergies indicates:  No Known Allergies  Past Medical History:   Diagnosis Date    Gastritis     GERD (gastroesophageal reflux disease)     Hypertension      History reviewed. No pertinent surgical history.  Family History   Problem Relation Age of Onset    Arthritis Mother     Hearing loss Father     Crohn's disease Sister      Social History     Tobacco Use    Smoking status: Current Some Day Smoker     Packs/day: 0.25     Years: 3.00     Pack years: 0.75     Types: Cigars    Smokeless tobacco: Never Used   Substance Use Topics    Alcohol use: Yes     Alcohol/week: 1.0 standard drinks     Types: 1 Cans of beer per week     Comment: one beer per month     Drug use: Yes     Comment: shena  daily     Review of Systems   Constitutional: Negative for fever.   HENT: Negative for sore throat.    Respiratory: Negative for shortness of breath.    Cardiovascular: Negative for chest pain.   Gastrointestinal: Positive for nausea and vomiting. Negative for abdominal pain and diarrhea.   Genitourinary: Negative for difficulty urinating and dysuria.   Musculoskeletal: Negative for back pain.   Skin: Negative for rash.   Neurological: Negative for weakness.   Hematological: Does not bruise/bleed easily.       Physical Exam     Initial Vitals [10/11/19 1026]   BP Pulse Resp Temp SpO2   (!) 172/98 64 18 98.8 °F (37.1 °C) 98 %      MAP       --         Physical Exam    Constitutional: He appears well-developed and well-nourished. He is not diaphoretic. No distress.   HENT:   Mouth/Throat: Oropharynx is clear and moist.   Eyes: Pupils are equal, round, and reactive to light.   Neck: Neck supple.   Cardiovascular: Normal rate and regular rhythm.   Pulmonary/Chest: Breath sounds normal. No respiratory distress.   Abdominal: Soft. Bowel sounds are normal. He exhibits no distension. There is no tenderness.   Musculoskeletal: He exhibits no edema.   Neurological: He is alert and oriented to person, place, and time. GCS score is 15. GCS eye subscore is 4. GCS verbal subscore is 5. GCS motor subscore is 6.   Skin: Skin is warm and dry.   Psychiatric: He has a normal mood and affect.         ED Course   Procedures  Labs Reviewed   CBC W/ AUTO DIFFERENTIAL - Abnormal; Notable for the following components:       Result Value    Gran% 75.1 (*)     Lymph% 16.4 (*)     All other components within normal limits   COMPREHENSIVE METABOLIC PANEL - Abnormal; Notable for the following components:    Potassium 2.9 (*)     Glucose 125 (*)     BUN, Bld 24 (*)     Creatinine 1.8 (*)     Calcium 10.6 (*)     Total Protein 9.3 (*)     Albumin 5.3 (*)     eGFR if  52 (*)     eGFR if non  45 (*)     All other  components within normal limits   URINALYSIS, REFLEX TO URINE CULTURE - Abnormal; Notable for the following components:    Appearance, UA Cloudy (*)     Protein, UA 3+ (*)     Ketones, UA Trace (*)     Occult Blood UA 2+ (*)     All other components within normal limits    Narrative:     Preferred Collection Type->Urine, Clean Catch   CK - Abnormal; Notable for the following components:     (*)     All other components within normal limits   URINALYSIS MICROSCOPIC - Abnormal; Notable for the following components:    RBC, UA 10 (*)     Hyaline Casts, UA 25 (*)     Granular Casts, UA 10 (*)     All other components within normal limits    Narrative:     Preferred Collection Type->Urine, Clean Catch   CK - Abnormal; Notable for the following components:     (*)     All other components within normal limits   ISTAT PROCEDURE - Abnormal; Notable for the following components:    POC Glucose 120 (*)     POC Creatinine 1.5 (*)     POC Potassium 3.2 (*)     POC Anion Gap 18 (*)     All other components within normal limits   LIPASE   MAGNESIUM          Imaging Results    None          Medical Decision Making:   Initial Assessment:   44-year-old male presents with nausea vomiting. Reports he had crampy abdominal pain yesterday that has since resolved.  No diarrhea.  No urinary symptoms, no fever.  Differential includes but not limited to electrolyte disturbance, pancreatitis, gastritis, cyclic vomiting syndrome. Discussed with patient possibility that MOJO use could be contributing to his symptoms. Workup initiated with labs, will treat with IV fluids and antiemetics.            Scribe Attestation:   Scribe #1: I performed the above scribed service and the documentation accurately describes the services I performed. I attest to the accuracy of the note.    Attending Attestation:           Physician Attestation for Scribe:  Physician Attestation Statement for Scribe #1: I, Angelita Ordonez MD, reviewed  documentation, as scribed by Emily Plascencia in my presence, and it is both accurate and complete.                 ED Course as of Oct 12 0803   Fri Oct 11, 2019   1220 On reassessment, patient reports feeling improved however still nauseous.  Will treat with Phenergan.  Awaiting labs.    [LH]   1322 Patient reports feeling improved.  Labs reviewed, creatinine elevated 1.8, potassium low 2.9.  CPK is also elevated.  Patient is been given 2 L of IV fluids, I will give him IV potassium.  If he is able to tolerate p.o. for fluids, I feel patient can be discharged home with close outpatient follow-up to recheck potassium and creatinine.    [LH]      ED Course User Index  [LH] Angelita Ordonez MD     Clinical Impression:       ICD-10-CM ICD-9-CM   1. Non-intractable vomiting with nausea, unspecified vomiting type R11.2 787.01   2. Hypokalemia E87.6 276.8   3. ADINA (acute kidney injury) N17.9 584.9   4. Dehydration E86.0 276.51            Scribe attestation: I, Angelita Ordonez MD, personally performed the services described in this documentation. All medical record entries made by the scribe were at my direction and in my presence. I have reviewed the chart and agree that the record reflects my personal performance and is accurate and complete.                     Angelita Ordonez MD  10/12/19 0803

## 2019-10-11 NOTE — ED NOTES
Pt given PO potassium tablets with water. Tolerated well. No n/v reported. Pt requested ice chips.

## 2019-10-11 NOTE — ED TRIAGE NOTES
Patient began with nausea and vomiting yesterday. States he vomited approximately 10 times since waking this am. Emesis bile colored. Placed on monitor.

## 2019-10-11 NOTE — CARE UPDATE
Results for SUKHDEV DUQUE (MRN [id number]) as of 10/11/2019 14:35   Ref. Range 10/11/2019 14:28   POC Sodium Latest Ref Range: 136 - 145 mmol/L 145   POC Potassium Latest Ref Range: 3.5 - 5.1 mmol/L 3.2 (L)   POC Chloride Latest Ref Range: 95 - 110 mmol/L 105   POC Anion Gap Latest Ref Range: 8 - 16 mmol/L 18 (H)   POC BUN Latest Ref Range: 6 - 30 mg/dL 24   POC Creatinine Latest Ref Range: 0.5 - 1.4 mg/dL 1.5 (H)   POC Glucose Latest Ref Range: 70 - 110 mg/dL 120 (H)   POC Ionized Calcium Latest Ref Range: 1.06 - 1.42 mmol/L 1.18   POC Hematocrit Latest Ref Range: 36 - 54 %PCV 44   POC TCO2 (MEASURED) Latest Ref Range: 23 - 29 mmol/L 26   Sample Unknown VENOUS

## 2019-10-13 ENCOUNTER — HOSPITAL ENCOUNTER (OUTPATIENT)
Facility: HOSPITAL | Age: 44
Discharge: HOME | End: 2019-10-14
Attending: EMERGENCY MEDICINE | Admitting: EMERGENCY MEDICINE
Payer: MEDICAID

## 2019-10-13 DIAGNOSIS — R11.2 INTRACTABLE VOMITING WITH NAUSEA, UNSPECIFIED VOMITING TYPE: Primary | ICD-10-CM

## 2019-10-13 DIAGNOSIS — R11.2 INTRACTABLE VOMITING WITH NAUSEA: ICD-10-CM

## 2019-10-13 DIAGNOSIS — I10 ESSENTIAL HYPERTENSION: Chronic | ICD-10-CM

## 2019-10-13 DIAGNOSIS — R74.8 ELEVATED LIPASE: ICD-10-CM

## 2019-10-13 DIAGNOSIS — F19.94 SUBSTANCE INDUCED MOOD DISORDER: ICD-10-CM

## 2019-10-13 DIAGNOSIS — E87.6 HYPOKALEMIA: ICD-10-CM

## 2019-10-13 DIAGNOSIS — F32.89 OTHER DEPRESSION: ICD-10-CM

## 2019-10-13 DIAGNOSIS — F19.10 SYNTHETIC CANNABINOID ABUSE: ICD-10-CM

## 2019-10-13 DIAGNOSIS — R11.10 VOMITING: ICD-10-CM

## 2019-10-13 DIAGNOSIS — F12.90 MARIJUANA USE: ICD-10-CM

## 2019-10-13 DIAGNOSIS — R10.13 EPIGASTRIC PAIN: ICD-10-CM

## 2019-10-13 DIAGNOSIS — K85.00 IDIOPATHIC ACUTE PANCREATITIS WITHOUT INFECTION OR NECROSIS: ICD-10-CM

## 2019-10-13 PROBLEM — F32.A DEPRESSION: Status: ACTIVE | Noted: 2019-10-13

## 2019-10-13 LAB
ALBUMIN SERPL BCP-MCNC: 4.9 G/DL (ref 3.5–5.2)
ALP SERPL-CCNC: 99 U/L (ref 55–135)
ALT SERPL W/O P-5'-P-CCNC: 16 U/L (ref 10–44)
ANION GAP SERPL CALC-SCNC: 14 MMOL/L (ref 8–16)
AST SERPL-CCNC: 18 U/L (ref 10–40)
BASOPHILS # BLD AUTO: 0.03 K/UL (ref 0–0.2)
BASOPHILS NFR BLD: 0.4 % (ref 0–1.9)
BILIRUB SERPL-MCNC: 1.1 MG/DL (ref 0.1–1)
BUN SERPL-MCNC: 26 MG/DL (ref 6–20)
CALCIUM SERPL-MCNC: 10.5 MG/DL (ref 8.7–10.5)
CHLORIDE SERPL-SCNC: 98 MMOL/L (ref 95–110)
CO2 SERPL-SCNC: 27 MMOL/L (ref 23–29)
CREAT SERPL-MCNC: 1.2 MG/DL (ref 0.5–1.4)
DIFFERENTIAL METHOD: NORMAL
EOSINOPHIL # BLD AUTO: 0 K/UL (ref 0–0.5)
EOSINOPHIL NFR BLD: 0.4 % (ref 0–8)
ERYTHROCYTE [DISTWIDTH] IN BLOOD BY AUTOMATED COUNT: 13.2 % (ref 11.5–14.5)
EST. GFR  (AFRICAN AMERICAN): >60 ML/MIN/1.73 M^2
EST. GFR  (NON AFRICAN AMERICAN): >60 ML/MIN/1.73 M^2
GLUCOSE SERPL-MCNC: 109 MG/DL (ref 70–110)
HCT VFR BLD AUTO: 49.2 % (ref 40–54)
HGB BLD-MCNC: 16.9 G/DL (ref 14–18)
IMM GRANULOCYTES # BLD AUTO: 0.02 K/UL (ref 0–0.04)
IMM GRANULOCYTES NFR BLD AUTO: 0.3 % (ref 0–0.5)
LIPASE SERPL-CCNC: 173 U/L (ref 4–60)
LYMPHOCYTES # BLD AUTO: 2.4 K/UL (ref 1–4.8)
LYMPHOCYTES NFR BLD: 30.2 % (ref 18–48)
MCH RBC QN AUTO: 31 PG (ref 27–31)
MCHC RBC AUTO-ENTMCNC: 34.3 G/DL (ref 32–36)
MCV RBC AUTO: 90 FL (ref 82–98)
MONOCYTES # BLD AUTO: 0.7 K/UL (ref 0.3–1)
MONOCYTES NFR BLD: 9.4 % (ref 4–15)
NEUTROPHILS # BLD AUTO: 4.6 K/UL (ref 1.8–7.7)
NEUTROPHILS NFR BLD: 59.3 % (ref 38–73)
NRBC BLD-RTO: 0 /100 WBC
PLATELET # BLD AUTO: 252 K/UL (ref 150–350)
PMV BLD AUTO: 9.3 FL (ref 9.2–12.9)
POTASSIUM SERPL-SCNC: 3.1 MMOL/L (ref 3.5–5.1)
PROT SERPL-MCNC: 8.5 G/DL (ref 6–8.4)
RBC # BLD AUTO: 5.46 M/UL (ref 4.6–6.2)
SODIUM SERPL-SCNC: 139 MMOL/L (ref 136–145)
WBC # BLD AUTO: 7.78 K/UL (ref 3.9–12.7)

## 2019-10-13 PROCEDURE — 96374 THER/PROPH/DIAG INJ IV PUSH: CPT | Mod: 59 | Performed by: EMERGENCY MEDICINE

## 2019-10-13 PROCEDURE — 25000003 PHARM REV CODE 250: Performed by: HOSPITALIST

## 2019-10-13 PROCEDURE — 96375 TX/PRO/DX INJ NEW DRUG ADDON: CPT

## 2019-10-13 PROCEDURE — 94761 N-INVAS EAR/PLS OXIMETRY MLT: CPT

## 2019-10-13 PROCEDURE — G0378 HOSPITAL OBSERVATION PER HR: HCPCS

## 2019-10-13 PROCEDURE — 96361 HYDRATE IV INFUSION ADD-ON: CPT

## 2019-10-13 PROCEDURE — 93010 ELECTROCARDIOGRAM REPORT: CPT | Mod: ,,, | Performed by: INTERNAL MEDICINE

## 2019-10-13 PROCEDURE — 93010 EKG 12-LEAD: ICD-10-PCS | Mod: ,,, | Performed by: INTERNAL MEDICINE

## 2019-10-13 PROCEDURE — 63600175 PHARM REV CODE 636 W HCPCS: Performed by: EMERGENCY MEDICINE

## 2019-10-13 PROCEDURE — 99285 EMERGENCY DEPT VISIT HI MDM: CPT | Mod: 25

## 2019-10-13 PROCEDURE — 96372 THER/PROPH/DIAG INJ SC/IM: CPT | Mod: 59

## 2019-10-13 PROCEDURE — 63600175 PHARM REV CODE 636 W HCPCS: Performed by: HOSPITALIST

## 2019-10-13 PROCEDURE — 25000003 PHARM REV CODE 250: Performed by: EMERGENCY MEDICINE

## 2019-10-13 PROCEDURE — 85025 COMPLETE CBC W/AUTO DIFF WBC: CPT

## 2019-10-13 PROCEDURE — 96365 THER/PROPH/DIAG IV INF INIT: CPT

## 2019-10-13 PROCEDURE — 96375 TX/PRO/DX INJ NEW DRUG ADDON: CPT | Performed by: EMERGENCY MEDICINE

## 2019-10-13 PROCEDURE — 80053 COMPREHEN METABOLIC PANEL: CPT

## 2019-10-13 PROCEDURE — 93005 ELECTROCARDIOGRAM TRACING: CPT

## 2019-10-13 PROCEDURE — 96361 HYDRATE IV INFUSION ADD-ON: CPT | Performed by: EMERGENCY MEDICINE

## 2019-10-13 PROCEDURE — 83690 ASSAY OF LIPASE: CPT

## 2019-10-13 RX ORDER — ONDANSETRON 2 MG/ML
4 INJECTION INTRAMUSCULAR; INTRAVENOUS EVERY 8 HOURS PRN
Status: DISCONTINUED | OUTPATIENT
Start: 2019-10-13 | End: 2019-10-14 | Stop reason: HOSPADM

## 2019-10-13 RX ORDER — SODIUM CHLORIDE AND POTASSIUM CHLORIDE 150; 900 MG/100ML; MG/100ML
1000 INJECTION, SOLUTION INTRAVENOUS CONTINUOUS
Status: DISPENSED | OUTPATIENT
Start: 2019-10-13 | End: 2019-10-14

## 2019-10-13 RX ORDER — AMLODIPINE BESYLATE 5 MG/1
10 TABLET ORAL DAILY
Status: DISCONTINUED | OUTPATIENT
Start: 2019-10-14 | End: 2019-10-14

## 2019-10-13 RX ORDER — AMOXICILLIN 250 MG
1 CAPSULE ORAL 2 TIMES DAILY
Status: DISCONTINUED | OUTPATIENT
Start: 2019-10-13 | End: 2019-10-14 | Stop reason: HOSPADM

## 2019-10-13 RX ORDER — RAMELTEON 8 MG/1
8 TABLET ORAL NIGHTLY PRN
Status: DISCONTINUED | OUTPATIENT
Start: 2019-10-13 | End: 2019-10-14 | Stop reason: HOSPADM

## 2019-10-13 RX ORDER — ONDANSETRON 2 MG/ML
4 INJECTION INTRAMUSCULAR; INTRAVENOUS
Status: COMPLETED | OUTPATIENT
Start: 2019-10-13 | End: 2019-10-13

## 2019-10-13 RX ORDER — DICYCLOMINE HYDROCHLORIDE 10 MG/ML
20 INJECTION INTRAMUSCULAR
Status: COMPLETED | OUTPATIENT
Start: 2019-10-13 | End: 2019-10-13

## 2019-10-13 RX ORDER — MAGNESIUM SULFATE 1 G/100ML
1 INJECTION INTRAVENOUS
Status: COMPLETED | OUTPATIENT
Start: 2019-10-13 | End: 2019-10-13

## 2019-10-13 RX ORDER — SODIUM CHLORIDE 0.9 % (FLUSH) 0.9 %
10 SYRINGE (ML) INJECTION
Status: DISCONTINUED | OUTPATIENT
Start: 2019-10-13 | End: 2019-10-14 | Stop reason: HOSPADM

## 2019-10-13 RX ORDER — HYDROXYZINE PAMOATE 25 MG/1
25 CAPSULE ORAL EVERY 8 HOURS PRN
Status: DISCONTINUED | OUTPATIENT
Start: 2019-10-13 | End: 2019-10-14 | Stop reason: HOSPADM

## 2019-10-13 RX ORDER — PROCHLORPERAZINE EDISYLATE 5 MG/ML
10 INJECTION INTRAMUSCULAR; INTRAVENOUS EVERY 6 HOURS PRN
Status: DISCONTINUED | OUTPATIENT
Start: 2019-10-13 | End: 2019-10-14 | Stop reason: HOSPADM

## 2019-10-13 RX ORDER — ACETAMINOPHEN 325 MG/1
650 TABLET ORAL EVERY 6 HOURS PRN
Status: DISCONTINUED | OUTPATIENT
Start: 2019-10-13 | End: 2019-10-13

## 2019-10-13 RX ORDER — METOCLOPRAMIDE HYDROCHLORIDE 5 MG/ML
10 INJECTION INTRAMUSCULAR; INTRAVENOUS
Status: COMPLETED | OUTPATIENT
Start: 2019-10-13 | End: 2019-10-13

## 2019-10-13 RX ORDER — ACETAMINOPHEN 325 MG/1
650 TABLET ORAL EVERY 6 HOURS PRN
Status: DISCONTINUED | OUTPATIENT
Start: 2019-10-13 | End: 2019-10-14 | Stop reason: HOSPADM

## 2019-10-13 RX ADMIN — RAMELTEON 8 MG: 8 TABLET, FILM COATED ORAL at 09:10

## 2019-10-13 RX ADMIN — SODIUM CHLORIDE, SODIUM LACTATE, POTASSIUM CHLORIDE, AND CALCIUM CHLORIDE 1000 ML: .6; .31; .03; .02 INJECTION, SOLUTION INTRAVENOUS at 02:10

## 2019-10-13 RX ADMIN — METOCLOPRAMIDE 10 MG: 5 INJECTION, SOLUTION INTRAMUSCULAR; INTRAVENOUS at 02:10

## 2019-10-13 RX ADMIN — HYDROXYZINE PAMOATE 25 MG: 25 CAPSULE ORAL at 09:10

## 2019-10-13 RX ADMIN — PROMETHAZINE HYDROCHLORIDE 25 MG: 25 INJECTION INTRAMUSCULAR; INTRAVENOUS at 03:10

## 2019-10-13 RX ADMIN — MAGNESIUM SULFATE 1 G: 1 INJECTION INTRAVENOUS at 05:10

## 2019-10-13 RX ADMIN — ONDANSETRON HYDROCHLORIDE 4 MG: 2 SOLUTION INTRAMUSCULAR; INTRAVENOUS at 04:10

## 2019-10-13 RX ADMIN — SODIUM CHLORIDE AND POTASSIUM CHLORIDE 1000 ML: .9; .15 SOLUTION INTRAVENOUS at 06:10

## 2019-10-13 RX ADMIN — DICYCLOMINE HYDROCHLORIDE 20 MG: 20 INJECTION, SOLUTION INTRAMUSCULAR at 02:10

## 2019-10-13 RX ADMIN — SENNOSIDES, DOCUSATE SODIUM 1 TABLET: 50; 8.6 TABLET, FILM COATED ORAL at 09:10

## 2019-10-13 RX ADMIN — ACETAMINOPHEN 650 MG: 325 TABLET, FILM COATED ORAL at 09:10

## 2019-10-13 RX ADMIN — PROCHLORPERAZINE EDISYLATE 10 MG: 5 INJECTION INTRAMUSCULAR; INTRAVENOUS at 09:10

## 2019-10-13 NOTE — ED NOTES
44 year old male to ED for LUQ abdominal pain x 5 days w nausea and vomiting. He denies fever, chills, diarrhea, or sick contacts.

## 2019-10-13 NOTE — NURSING TRANSFER
Nursing Transfer Note      10/13/2019     Transfer From: ED    Transfer via stretcher    Transfer with IV Magnesium infusing    Transported by transporter    Medicines sent: NS with 20KCl IV fluids    Chart send with patient: Yes    Notified: Jameel Gonzalez NP    Patient reassessed at: 10/13/2019, 1824    Upon arrival to floor: patient oriented to room, call bell in reach and bed in lowest position

## 2019-10-13 NOTE — LETTER
October 14, 2019         [place] [address]  Phone: [phone]  Fax: [phone]       Patient: Jayson Berkowitz   YOB: 1975  Date of Visit: 10/14/2019    To Whom It May Concern:    Jorge Berkowitz  was at [facility] on 10/13/19 through 10/14/2019. He may return to work/school on 10/16/19 with no restrictions. If you have any questions or concerns, or if I can be of further assistance, please do not hesitate to contact me.    Sincerely,        JORGE Petersen, FNP-C  Hospitalist - Department of Hospital Medicine  [facility]  [address] [place] [address]  Office [phone]; Pager [phone]

## 2019-10-13 NOTE — NURSING
Patient began crying and stating that he has been trying to kill self by smoking mojo daily. Patient consoled. Jameel Gonzalez NP notified. NP to bedside.

## 2019-10-13 NOTE — ED PROVIDER NOTES
Encounter Date: 10/13/2019    SCRIBE #1 NOTE: I, Carlos Townsend, am scribing for, and in the presence of,  Lio Dixon Jr., MD. I have scribed the following portions of the note - Other sections scribed: HPI/ROS.       History     Chief Complaint   Patient presents with    Abdominal Pain     Onset x 4 days ago. Pt reports returning today to ED with same symptoms from visit 4 days.  Pt reports abd pain has never gotten better.  Unable to eat and still weak.  Pt reports taking phenergan oral with no relief.     CC: Nausea; Vomiting    HPI: This 44 y.o. Male with a medical hx of GERD, HTN, and gastritis presents to the ED for an emergent evaluation of n/v. There is associated abdominal pain and chest pain secondary to vomiting. Pt reports n/v have been intermittent x years, as he has a chronic hx of marijuana use. Pt also reports a decreased appetite and a subjective fever. LBM was 3 days ago. He attempted tx with Phenergan x3 tablets previously with no relief, as he vomited shortly after taking the medication. No alleviating factors. Otherwise, pt denies blood in stool, melena, hematemesis, chills, SOB, urinary symptoms, and any other associated symptoms.    The history is provided by the patient. No  was used.     Review of patient's allergies indicates:  No Known Allergies  Past Medical History:   Diagnosis Date    Gastritis     GERD (gastroesophageal reflux disease)     Hypertension      History reviewed. No pertinent surgical history.  Family History   Problem Relation Age of Onset    Arthritis Mother     Hearing loss Father     Crohn's disease Sister      Social History     Tobacco Use    Smoking status: Current Some Day Smoker     Packs/day: 0.25     Years: 3.00     Pack years: 0.75     Types: Cigars    Smokeless tobacco: Never Used   Substance Use Topics    Alcohol use: Yes     Alcohol/week: 1.0 standard drinks     Types: 1 Cans of beer per week     Comment: one beer per  month     Drug use: Yes     Comment: shena daily     Review of Systems   Constitutional: Positive for appetite change and fever (subjective). Negative for chills.   HENT: Negative for congestion, rhinorrhea and sore throat.    Eyes: Negative for pain and visual disturbance.   Respiratory: Negative for cough and shortness of breath.    Cardiovascular: Positive for chest pain (secondary to vomiting).   Gastrointestinal: Positive for abdominal pain (secondary to vomiting), nausea and vomiting. Negative for diarrhea.        (-) Melena  (-) Hematemesis    Genitourinary: Negative for dysuria.   Musculoskeletal: Negative for neck stiffness.   Skin: Negative for rash.   Neurological: Negative for headaches.       Physical Exam     Initial Vitals [10/13/19 1402]   BP Pulse Resp Temp SpO2   (!) 174/104 96 18 99.1 °F (37.3 °C) 98 %      MAP       --         Physical Exam    Nursing note and vitals reviewed.  Constitutional: He appears well-developed and well-nourished. He is not diaphoretic. No distress.   HENT:   Head: Normocephalic and atraumatic.   Right Ear: External ear normal.   Left Ear: External ear normal.   Nose: Nose normal.   Mouth/Throat: Oropharynx is clear and moist.   Eyes: Conjunctivae and EOM are normal. Pupils are equal, round, and reactive to light. Right eye exhibits no discharge. Left eye exhibits no discharge. No scleral icterus.   Neck: Normal range of motion. Neck supple. No JVD present.   Cardiovascular: Normal rate, regular rhythm, normal heart sounds and intact distal pulses. Exam reveals no gallop and no friction rub.    No murmur heard.  Pulmonary/Chest: Breath sounds normal. No stridor. No respiratory distress. He has no wheezes. He has no rhonchi. He has no rales. He exhibits no tenderness.   Abdominal: Soft. Bowel sounds are normal. He exhibits no distension and no mass. There is no tenderness. There is no rebound and no guarding.   Patient's abdomen is soft and nondistended.  No tenderness in  any of the 4 quadrants.  Bowel sounds are normal.  No hernias or masses.   Musculoskeletal: Normal range of motion. He exhibits no edema or tenderness.   Neurological: He is alert and oriented to person, place, and time. He has normal strength. No cranial nerve deficit or sensory deficit.   Skin: Skin is warm and dry. No rash noted. No erythema. No pallor.   Psychiatric: He has a normal mood and affect. His behavior is normal. Judgment and thought content normal.         ED Course   Procedures  Labs Reviewed   COMPREHENSIVE METABOLIC PANEL - Abnormal; Notable for the following components:       Result Value    Potassium 3.1 (*)     BUN, Bld 26 (*)     Total Protein 8.5 (*)     Total Bilirubin 1.1 (*)     All other components within normal limits   LIPASE - Abnormal; Notable for the following components:    Lipase 173 (*)     All other components within normal limits   CBC W/ AUTO DIFFERENTIAL        ECG Results          EKG 12-lead (In process)  Result time 10/13/19 16:13:12    In process by Interface, Lab In [facility] (10/13/19 16:13:12)                 Narrative:    Test Reason : R11.10,    Vent. Rate : 080 BPM     Atrial Rate : 080 BPM     P-R Int : 116 ms          QRS Dur : 102 ms      QT Int : 508 ms       P-R-T Axes : 083 085 082 degrees     QTc Int : 585 ms    Normal sinus rhythm  Right atrial enlargement  Prolonged QT  Abnormal ECG  When compared with ECG of 24-AUG-2019 02:35,  Significant changes have occurred    Referred By: [email or web address]   SELF           Confirmed By:                   In process by Interface, Lab In [facility] (10/13/19 16:10:19)                 Narrative:    Test Reason : R11.10,    Vent. Rate : 080 BPM     Atrial Rate : 080 BPM     P-R Int : 116 ms          QRS Dur : 102 ms      QT Int : 508 ms       P-R-T Axes : 083 085 082 degrees     QTc Int : 585 ms    Normal sinus rhythm  Right atrial enlargement  Prolonged QT  Abnormal ECG  When compared with ECG of 24-AUG-2019 02:35,  Significant changes  have occurred    Referred By: AAAREFERR   SELF           Confirmed By:                             Imaging Results    None          Medical Decision Making:   ED Management:  This is the emergent evaluation of a 44-year-old male presents emergency department complaining of intractable vomiting at home.  Patient states he was seen 2 days earlier for the same symptoms but has not had any relief at home and has been unable to keep Phenergan down by mouth.  Differential diagnosis at the time of initial evaluation included, but was not limited to:  Gastroparesis, cyclic vomiting syndrome, cannabinoid hyperemesis syndrome, dehydration, acute renal failure, metabolic disturbance, electrolyte abnormalities.  I considered but doubt intra-abdominal infection or surgical etiology of the patient's symptoms.  His abdomen is soft and nontender with normal bowel sounds.  No distention.  No hernias or masses.       This patient's laboratory abnormalities have actually improved since prior.  His potassium is still low but is coming up.  I have given him IV potassium as well as magnesium.  He has received IV fluids.  He has received 3 different antiemetics but is still is unable to tolerate oral fluids.  He has not received opioids as this will likely cause worsening gastric emptying.  I did give him Bentyl for abdominal cramping.  He will need admission to the hospital at this time.  His lipase is elevated at 176.  However, his abdominal exam is very reassuring without significant tenderness and no signs of peritonitis.  Case discussed with the admitting nurse practitioner, Jameel.  Patient is stable for admission and agrees with the above plan.            Scribe Attestation:   Scribe #1: I performed the above scribed service and the documentation accurately describes the services I performed. I attest to the accuracy of the note.           Scribe Attestation: I, Lio Dixon MD, personally performed the services described in this  documentation. All medical record entries made by the scribe were at my direction and in my presence. I have reviewed the chart and agree that the record reflects my personal performance and is accurate and complete.     Clinical Impression:       ICD-10-CM ICD-9-CM   1. Intractable vomiting with nausea, unspecified vomiting type R11.2 536.2   2. Vomiting R11.10 787.03   3. 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 Hypokalemia E87.6 276.8   5. Elevated lipase R74.8 790.5                                Lio Dixon Jr., MD  10/13/19 8593

## 2019-10-14 VITALS
WEIGHT: 174.19 LBS | RESPIRATION RATE: 18 BRPM | TEMPERATURE: 99 F | BODY MASS INDEX: 22.35 KG/M2 | HEIGHT: 74 IN | DIASTOLIC BLOOD PRESSURE: 84 MMHG | HEART RATE: 71 BPM | SYSTOLIC BLOOD PRESSURE: 147 MMHG | OXYGEN SATURATION: 98 %

## 2019-10-14 PROBLEM — F19.94 SUBSTANCE INDUCED MOOD DISORDER: Status: ACTIVE | Noted: 2019-10-14

## 2019-10-14 PROBLEM — K85.90 ACUTE PANCREATITIS: Status: ACTIVE | Noted: 2019-10-14

## 2019-10-14 LAB
ALBUMIN SERPL BCP-MCNC: 4.2 G/DL (ref 3.5–5.2)
ALP SERPL-CCNC: 94 U/L (ref 55–135)
ALT SERPL W/O P-5'-P-CCNC: 14 U/L (ref 10–44)
AMYLASE SERPL-CCNC: 602 U/L (ref 20–110)
ANION GAP SERPL CALC-SCNC: 10 MMOL/L (ref 8–16)
AST SERPL-CCNC: 15 U/L (ref 10–40)
BILIRUB SERPL-MCNC: 1.2 MG/DL (ref 0.1–1)
BUN SERPL-MCNC: 20 MG/DL (ref 6–20)
CALCIUM SERPL-MCNC: 9.4 MG/DL (ref 8.7–10.5)
CHLORIDE SERPL-SCNC: 103 MMOL/L (ref 95–110)
CO2 SERPL-SCNC: 29 MMOL/L (ref 23–29)
CREAT SERPL-MCNC: 1.2 MG/DL (ref 0.5–1.4)
EST. GFR  (AFRICAN AMERICAN): >60 ML/MIN/1.73 M^2
EST. GFR  (NON AFRICAN AMERICAN): >60 ML/MIN/1.73 M^2
GLUCOSE SERPL-MCNC: 108 MG/DL (ref 70–110)
HIV1+2 IGG SERPL QL IA.RAPID: NEGATIVE
LIPASE SERPL-CCNC: 64 U/L (ref 4–60)
LIPASE SERPL-CCNC: 682 U/L (ref 4–60)
POTASSIUM SERPL-SCNC: 3.4 MMOL/L (ref 3.5–5.1)
PROT SERPL-MCNC: 7.5 G/DL (ref 6–8.4)
SODIUM SERPL-SCNC: 142 MMOL/L (ref 136–145)

## 2019-10-14 PROCEDURE — 63600175 PHARM REV CODE 636 W HCPCS: Performed by: EMERGENCY MEDICINE

## 2019-10-14 PROCEDURE — 63600175 PHARM REV CODE 636 W HCPCS: Performed by: NURSE PRACTITIONER

## 2019-10-14 PROCEDURE — G0378 HOSPITAL OBSERVATION PER HR: HCPCS

## 2019-10-14 PROCEDURE — 94761 N-INVAS EAR/PLS OXIMETRY MLT: CPT

## 2019-10-14 PROCEDURE — 82150 ASSAY OF AMYLASE: CPT

## 2019-10-14 PROCEDURE — 86703 HIV-1/HIV-2 1 RESULT ANTBDY: CPT

## 2019-10-14 PROCEDURE — 83690 ASSAY OF LIPASE: CPT

## 2019-10-14 PROCEDURE — 80074 ACUTE HEPATITIS PANEL: CPT

## 2019-10-14 PROCEDURE — 83690 ASSAY OF LIPASE: CPT | Mod: 91

## 2019-10-14 PROCEDURE — 96361 HYDRATE IV INFUSION ADD-ON: CPT | Mod: 59 | Performed by: EMERGENCY MEDICINE

## 2019-10-14 PROCEDURE — 90792 PSYCH DIAG EVAL W/MED SRVCS: CPT | Mod: SA,HB,, | Performed by: NURSE PRACTITIONER

## 2019-10-14 PROCEDURE — 36415 COLL VENOUS BLD VENIPUNCTURE: CPT

## 2019-10-14 PROCEDURE — 80053 COMPREHEN METABOLIC PANEL: CPT

## 2019-10-14 PROCEDURE — 90792 PR PSYCHIATRIC DIAGNOSTIC EVALUATION W/MEDICAL SERVICES: ICD-10-PCS | Mod: SA,HB,, | Performed by: NURSE PRACTITIONER

## 2019-10-14 PROCEDURE — 25000003 PHARM REV CODE 250: Performed by: EMERGENCY MEDICINE

## 2019-10-14 PROCEDURE — 96375 TX/PRO/DX INJ NEW DRUG ADDON: CPT | Mod: 59 | Performed by: EMERGENCY MEDICINE

## 2019-10-14 PROCEDURE — 25000003 PHARM REV CODE 250: Performed by: NURSE PRACTITIONER

## 2019-10-14 RX ORDER — SODIUM CHLORIDE 9 MG/ML
INJECTION, SOLUTION INTRAVENOUS CONTINUOUS
Status: DISCONTINUED | OUTPATIENT
Start: 2019-10-14 | End: 2019-10-14 | Stop reason: HOSPADM

## 2019-10-14 RX ORDER — MORPHINE SULFATE 10 MG/ML
5 INJECTION INTRAMUSCULAR; INTRAVENOUS; SUBCUTANEOUS EVERY 4 HOURS PRN
Status: DISCONTINUED | OUTPATIENT
Start: 2019-10-14 | End: 2019-10-14 | Stop reason: HOSPADM

## 2019-10-14 RX ORDER — ENOXAPARIN SODIUM 100 MG/ML
40 INJECTION SUBCUTANEOUS EVERY 24 HOURS
Status: DISCONTINUED | OUTPATIENT
Start: 2019-10-14 | End: 2019-10-14 | Stop reason: HOSPADM

## 2019-10-14 RX ORDER — OXYCODONE AND ACETAMINOPHEN 5; 325 MG/1; MG/1
1 TABLET ORAL EVERY 6 HOURS PRN
Status: DISCONTINUED | OUTPATIENT
Start: 2019-10-14 | End: 2019-10-14

## 2019-10-14 RX ORDER — OXYCODONE AND ACETAMINOPHEN 10; 325 MG/1; MG/1
1 TABLET ORAL EVERY 6 HOURS PRN
Status: DISCONTINUED | OUTPATIENT
Start: 2019-10-14 | End: 2019-10-14 | Stop reason: HOSPADM

## 2019-10-14 RX ORDER — OXYCODONE AND ACETAMINOPHEN 10; 325 MG/1; MG/1
1 TABLET ORAL EVERY 6 HOURS PRN
Status: DISCONTINUED | OUTPATIENT
Start: 2019-10-14 | End: 2019-10-14

## 2019-10-14 RX ORDER — AMLODIPINE BESYLATE 5 MG/1
10 TABLET ORAL DAILY
Status: DISCONTINUED | OUTPATIENT
Start: 2019-10-14 | End: 2019-10-14 | Stop reason: HOSPADM

## 2019-10-14 RX ADMIN — SODIUM CHLORIDE: 0.9 INJECTION, SOLUTION INTRAVENOUS at 10:10

## 2019-10-14 RX ADMIN — SENNOSIDES, DOCUSATE SODIUM 1 TABLET: 50; 8.6 TABLET, FILM COATED ORAL at 07:10

## 2019-10-14 RX ADMIN — AMLODIPINE BESYLATE 10 MG: 5 TABLET ORAL at 07:10

## 2019-10-14 RX ADMIN — ONDANSETRON HYDROCHLORIDE 4 MG: 2 SOLUTION INTRAMUSCULAR; INTRAVENOUS at 03:10

## 2019-10-14 NOTE — NURSING
Report given to CHLOE Hardy. Patient resting comfortably in bed. Denies any complaints or needs at this time. Bed in lowest position, wheels locked and call light within reach. POC reviewed with patient, patient verbalized understanding.

## 2019-10-14 NOTE — PROGRESS NOTES
WRITTEN HEALTHCARE DISCHARGE INFORMATION      Things that YOU are RESPONSIBLE for to Manage Your Care At Home:     1. Getting your prescriptions filled.  2. Taking you medications as directed. DO NOT MISS ANY DOSES!  3. Going to your follow-up doctor appointments. This is important because it allows the doctor to monitor your progress and to determine if any changes need to be made to your treatment plan.     If you are unable to make your follow up appointments, please call the number listed and reschedule this appointment.      ____________HELP AT HOME____________________     Experiencing any SIGNS or SYMPTOMS: YOU CAN     Schedule a same day appopintment with your Primary Care Doctor or  you can call [facility] On Call Nurse Care Line for 24/7 assistance at [phone]     If you are experience any signs or symptoms that have become severe, Call 911 and come to your nearest Emergency Room.     Thank you for choosing [facility] and allowing us to care for you.   From your care management team:      You should receive a call from [facility]charge [facility] within 48-72 hours to help manage your care after discharge. Please try to make sure that you answer your phone for this important phone call.    Follow-up Information     [facility].    Why:  please call at time of discharge to follow up post hospital discharge also to establish care for future medical needs  Contact information:  [address] [place] [address]  [phone]

## 2019-10-14 NOTE — PLAN OF CARE
"   10/14/19 1333   Final Note   Assessment Type Final Discharge Note   Anticipated Discharge Disposition Home   Hospital Follow Up  Appt(s) scheduled?   (provided information for [facility] and instructed to call to schedule appointment to establish care for future medical needs)   Discharge plans and expectations educations in teach back method with documentation complete? Yes   Right Care Referral Info   Post Acute Recommendation No Care   pts nurse Yarely notified that pt can d/c from CM standpoint.      EDUCATION:   provided with educational information on vomiting.  Information reviewed and placed in :My Healthcare Packet" to be brought home  to use as resource after discharge.  Information included:  signs and symptoms to look for and call the doctor if experiencing, and symptoms that may indicate a medical emergency: CALL 911.      All questions answered.  Teach back method used.    Patient stated, "will take medications as prescribed  ".        "

## 2019-10-14 NOTE — H&P
[facility] Medicine  History & Physical    Patient Name: Jayson Berkowitz  MRN: [id number]  Admission Date: 10/13/2019  Attending Physician: Mamie Lockwood MD   Primary Care Provider: Primary Doctor No         Patient information was obtained from patient, past medical records and ER records.     Subjective:     Principal Problem:Intractable vomiting with nausea    Chief Complaint:   Chief Complaint   Patient presents with    Abdominal Pain     Onset x 4 days ago. Pt reports returning today to ED with same symptoms from visit 4 days.  Pt reports abd pain has never gotten better.  Unable to eat and still weak.  Pt reports taking phenergan oral with no relief.        HPI: 44 y.o. male with gastritis and synthetic marijuana use presents with complaint nausea and vomiting for the past 4 days.  Associated with left upper quadrant abdominal pain and poor p.o. intake.  Attempted self treatment at home with promethazine tablets without relief.  He denies fever, chills, cough, SOB, chest pain, hematemesis, coffee-ground emesis, bloody or black stools, or dysuria.  He reports intermittent episodes of similar symptoms dating back to 2008.  Smokes synthetic marijuana almost daily to cope with feelings of depression and anxiety because it helps him sleep.  States that sometimes he has thoughts of death and wishes that he would die, but denies any current SI/HI or any plan of hurting himself.  In the ED, routine labs revealed mild hypokalemia and elevated lipase 173.  He received a number of antiemetics, but was still unable to tolerate p.o. intake.  Placed in observation for further evaluation and treatment.    Past Medical History:   Diagnosis Date    Gastritis     GERD (gastroesophageal reflux disease)     Hypertension        History reviewed. No pertinent surgical history.    Review of patient's allergies indicates:  No Known Allergies    No current facility-administered medications on file  prior to encounter.      Current Outpatient Medications on File Prior to Encounter   Medication Sig    amLODIPine (NORVASC) 10 MG tablet Take 1 tablet (10 mg total) by mouth once daily.    ondansetron (ZOFRAN-ODT) 4 MG TbDL Take 1 tablet (4 mg total) by mouth every 6 (six) hours as needed.    potassium chloride (K-TAB) 20 mEq Take 1 tablet (20 mEq total) by mouth once daily. for 4 days    promethazine (PHENERGAN) 25 MG suppository Place 1 suppository (25 mg total) rectally every 6 (six) hours as needed for Nausea.    promethazine (PHENERGAN) 25 MG tablet Take 1 tablet (25 mg total) by mouth every 6 (six) hours as needed for Nausea.     Family History     Problem Relation (Age of Onset)    Arthritis Mother    Crohn's disease Sister    Hearing loss Father        Tobacco Use    Smoking status: Current Some Day Smoker     Packs/day: 0.25     Years: 3.00     Pack years: 0.75     Types: Cigars    Smokeless tobacco: Never Used   Substance and Sexual Activity    Alcohol use: Yes     Alcohol/week: 1.0 standard drinks     Types: 1 Cans of beer per week     Comment: one beer per month     Drug use: Yes     Comment: mojo daily    Sexual activity: Yes     Partners: Female     Birth control/protection: None     Review of Systems   Constitutional: Negative for chills and fever.   Eyes: Negative for photophobia and visual disturbance.   Respiratory: Negative for cough and shortness of breath.    Cardiovascular: Negative for chest pain, palpitations and leg swelling.   Gastrointestinal: Positive for abdominal pain, nausea and vomiting. Negative for diarrhea.   Genitourinary: Negative for frequency, hematuria and urgency.   Skin: Negative for pallor, rash and wound.   Neurological: Negative for light-headedness and headaches.   Psychiatric/Behavioral: Positive for dysphoric mood and sleep disturbance. Negative for confusion and decreased concentration. The patient is nervous/anxious.      Objective:     Vital Signs (Most  Recent):  Temp: 98.9 °F (37.2 °C) (10/13/19 1818)  Pulse: 77 (10/13/19 1818)  Resp: 17 (10/13/19 1818)  BP: (!) 147/86 (10/13/19 1818)  SpO2: (!) 92 % (10/13/19 1818) Vital Signs (24h Range):  Temp:  [98.8 °F (37.1 °C)-99.1 °F (37.3 °C)] 98.9 °F (37.2 °C)  Pulse:  [68-96] 77  Resp:  [15-18] 17  SpO2:  [92 %-100 %] 92 %  BP: (136-174)/() 147/86     Weight: 79 kg (174 lb 2.6 oz)  Body mass index is 22.36 kg/m².    Physical Exam   Constitutional: He is oriented to person, place, and time. He appears well-developed and well-nourished. No distress.   HENT:   Head: Normocephalic and atraumatic.   Right Ear: External ear normal.   Left Ear: External ear normal.   Nose: Nose normal.   Mouth/Throat: Oropharynx is clear and moist.   Eyes: Pupils are equal, round, and reactive to light. Conjunctivae and EOM are normal.   Neck: Normal range of motion. Neck supple.   Cardiovascular: Normal rate, regular rhythm and intact distal pulses.   Pulmonary/Chest: Effort normal and breath sounds normal. No respiratory distress. He has no wheezes. He has no rales.   Abdominal: Soft. Bowel sounds are normal. He exhibits no distension. There is no tenderness.   No palpable hepatomegaly or splenomegaly   Musculoskeletal: Normal range of motion. He exhibits no edema or tenderness.   Neurological: He is alert and oriented to person, place, and time.   Skin: Skin is warm and dry.   Psychiatric: Thought content normal. His speech is rapid and/or pressured. He exhibits a depressed mood. He expresses no homicidal and no suicidal ideation. He expresses no suicidal plans and no homicidal plans.   Nursing note and vitals reviewed.        CRANIAL NERVES     CN III, IV, VI   Pupils are equal, round, and reactive to light.  Extraocular motions are normal.        Significant Labs: All pertinent labs within the past 24 hours have been reviewed.    Significant Imaging: I have reviewed all pertinent imaging results/findings within the past 24  hours.    Assessment/Plan:     * Intractable vomiting with nausea  Many recurrent episodes of the same in the past attributed to synthetic marijuana use, appears overall stable but unable to tolerate p.o. intake at this time.  -continue supportive care with IV fluids and antiemetics as needed  -NPO  -repeat lipase in a.m.    Hypokalemia  Received p.o. replacement and IV magnesium in the ED.  Mild and asymptomatic.  Check BMP in a.m..    Depression  He mentions suicidal thoughts to nursing staff and reports that he uses synthetic marijuana to self medicate his feelings of depression and anxiety.  At the time of my physical exam and history he denies any current suicidal or homicidal ideation and states that he has never developed a plan for doing such.  Will consult Psychiatry for recommendation and case management to assist with available community resources upon discharge.    Essential hypertension  Well controlled, continue home medications and monitor blood pressure, adjust as needed.       VTE Risk Mitigation (From admission, onward)         Ordered     IP VTE LOW RISK PATIENT  Once      10/13/19 1815     Place KRISTAL hose  Until discontinued      10/13/19 1815              Jameel Gonzalez Jr., APRN, AGAP-BC  Hospitalist - Department of Hospital Medicine  [facility] [address] [place] [id number]  Office #: [phone]; Pager #: [phone]

## 2019-10-14 NOTE — NURSING
Patient escorted by PCT to family vehicle for discharge home. Patient accompanied by spouse and son. No apparent distress noted.

## 2019-10-14 NOTE — PLAN OF CARE
Pt remained free of falls during current shift. Reported having cramping to the abdomen and received prn pain medication. Also, pt reported being nauseous and received multiple antiemetics. Plan of care and fall precautions reviewed with pt and verbalized understanding. Bed locked, lowered, SR up x2 and call light placed within reach.

## 2019-10-14 NOTE — PROGRESS NOTES
"Vomiting (Adult)  Vomiting is a common symptom that may be due to different causes. These include gastroenteritis ("stomach flu"), food poisoning and gastritis. There are other more serious causes of vomiting which may be hard to diagnose early in the illness. Therefore, it is important to watch for the warning signs listed below.  The main danger from repeated vomiting is dehydration. This is due to excess loss of water and minerals from the body. When this occurs, body fluids must be replaced.  Home care  · If symptoms are severe, rest at home for the next 24 hours.  · Because your symptoms may be from an infection, wash your hands frequently and well, and use alcohol-based  to avoid spreading the infection to others.  · Wash your hands for at least 20 seconds. Hum the happy birthday song twice for the correct length of time.  · Wash your hands after using the toilet, before and after preparing food, before eating food, after changing a diaper, cleaning a wound, caring for a sick person, and blowing your nose, coughing, or sneezing. You should also wash your hands after caring for someone who is sick, touching pet food, or treats, and touching an animal, or animal waste.  · You may use acetaminophen or NSAID medicines like ibuprofen or naproxen to control fever, unless another medicine was prescribed. If you have chronic liver or kidney disease or ever had a stomach ulcer or GI bleeding, talk with your doctor before using these medicines. Aspirin should never be used in anyone under 18 years of age who is ill with a fever. It may cause severe liver damage. Don't use NSAID medicines if you are already taking one for another condition (like arthritis) or are on aspirin (such as for heart disease, or after a stroke)  · Avoid tobacco and alcohol use, which may worsen your symptoms.  · If medicines for vomiting were prescribed, take as directed.  · Once vomiting stops, then follow these guidelines:  During the " first 12 to 24 hours follow the diet below:  · Fruit juices. Apple, grape juice, clear fruit drinks, and electrolyte replacement drinks.  · Beverages. Soft drinks without caffeine; mineral water (plain or flavored), decaffeinated tea and coffee.  · Soups. Clear broth, consommé and bouillon  · Desserts. Plain gelatin, popsicles and fruit juice bars. As you feel better, you may add 6-8 ounces of yogurt per day.  During the next 24 hours you may add the following to the above:  · Hot cereal, plain toast, bread, rolls, crackers  · Plain noodles, rice, mashed potatoes, chicken noodle or rice soup  · Unsweetened canned fruit (avoid pineapple), bananas  · Limit caffeine and chocolate. No spices or seasonings except salt.  During the next 24 hours:  Gradually resume a normal diet, as you feel better and your symptoms lessen.  Follow-up care  Follow up with your healthcare provider, or as advised.  When to seek medical advice  Call your healthcare provider right away if any of these occur:  · Constant right-sided lower abdominal pain or increasing general abdominal pain  · Continued vomiting (unable to keep liquids down) for 24 hours  · Frequent diarrhea (more than 5 times a day); blood (red or black color) or mucus in diarrhea  · Reduced urine output or extreme thirst  · Weakness, dizziness or fainting  · Unusually drowsy or confused  · Fever of 100.4°F (38°C) oral or higher, or as directed  · Yellow color of the eyes or skin
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
## 2019-10-14 NOTE — ASSESSMENT & PLAN NOTE
He mentions suicidal thoughts to nursing staff and reports that he uses synthetic marijuana to self medicate his feelings of depression and anxiety.  At the time of my physical exam and history he denies any current suicidal or homicidal ideation and states that he has never developed a plan for doing such.  Will consult Psychiatry for recommendation and case management to assist with available community resources upon discharge.

## 2019-10-14 NOTE — ASSESSMENT & PLAN NOTE
His overall symptom complex includes: daily mojo use, crying, multiple psychosocial stressors, visual hallucinations, feels sad, feelings of worthlessness/hopelessness with heavy mojo use, periods of energy without sleep, history of spending sprees, all symptoms started after use of Mojo.  Jayson Berkowitz is not suicidal, homicidal or gravely disabled from a mental health prospective and therefore, does not meet the criteria for a PEC.    Recommendation: This patient would probably do best with some type of drug rehabilitation program. Unfortunately, due to his work schedule he is not open to this at this time and drug rehabilitation cannot be forced in the [facility]. Would recommend patient follow up with outpatient psychiatry upon discharge. Utilize Zyprexa 10 mg IM q 8 hours prn agitation and uncontrollable threatening behavior.

## 2019-10-14 NOTE — HPI
Jayson Berkowitz is a 44 year old male who presented to [facility] ED with complaint of LUQ abdominal pain x 5 days w nausea and vomiting.

## 2019-10-14 NOTE — HOSPITAL COURSE
44 y.o. AAM who is an admitted mojo abuser, who presented with complaint of NV, and abdominal pain placed in observation. He was noted to be profoundly dehydrated which he admits happens to him when he is coming down off mo will. He was rehydrated with IV fluids and monitored overnight. His creatine normalized, calcium normalized. His lipase was elevated over 600 denies drinking history??? - trended downward after fluids. Abdominal US was obtained and was unremarkable except for mass in R hepatic lobe likely representing a hemangioma. He does not appear juandice ALT/AST normal. Although no tox screen was obtained in the ED at the time of presentation, he has been in and out of the shower all day which is indicative of his marij & mojo use. He has asked for food, been able to tolerate an advanced diet and has not requested anything for pain. He has positive bowel sounds X 4 quads and no acute abdomen is appreciated. He was seen by Psych due to depressive behavior of crying and stating that he was overly stressed due to multiple children with multiple baby momma's. Nurses report one women stayed in the room with a baby all night with him. Psych noted that the patient did not display any gravely disabled symptoms and did not recommend any further medication or treatment. His vitals are normal and and will discharge to home.

## 2019-10-14 NOTE — SUBJECTIVE & OBJECTIVE
Past Medical History:   Diagnosis Date    Gastritis     GERD (gastroesophageal reflux disease)     Hypertension        History reviewed. No pertinent surgical history.    Review of patient's allergies indicates:  No Known Allergies    No current facility-administered medications on file prior to encounter.      Current Outpatient Medications on File Prior to Encounter   Medication Sig    amLODIPine (NORVASC) 10 MG tablet Take 1 tablet (10 mg total) by mouth once daily.    ondansetron (ZOFRAN-ODT) 4 MG TbDL Take 1 tablet (4 mg total) by mouth every 6 (six) hours as needed.    potassium chloride (K-TAB) 20 mEq Take 1 tablet (20 mEq total) by mouth once daily. for 4 days    promethazine (PHENERGAN) 25 MG suppository Place 1 suppository (25 mg total) rectally every 6 (six) hours as needed for Nausea.    promethazine (PHENERGAN) 25 MG tablet Take 1 tablet (25 mg total) by mouth every 6 (six) hours as needed for Nausea.     Family History     Problem Relation (Age of Onset)    Arthritis Mother    Crohn's disease Sister    Hearing loss Father        Tobacco Use    Smoking status: Current Some Day Smoker     Packs/day: 0.25     Years: 3.00     Pack years: 0.75     Types: Cigars    Smokeless tobacco: Never Used   Substance and Sexual Activity    Alcohol use: Yes     Alcohol/week: 1.0 standard drinks     Types: 1 Cans of beer per week     Comment: one beer per month     Drug use: Yes     Comment: mojo daily    Sexual activity: Yes     Partners: Female     Birth control/protection: None     Review of Systems   Constitutional: Negative for chills and fever.   Eyes: Negative for photophobia and visual disturbance.   Respiratory: Negative for cough and shortness of breath.    Cardiovascular: Negative for chest pain, palpitations and leg swelling.   Gastrointestinal: Positive for abdominal pain, nausea and vomiting. Negative for diarrhea.   Genitourinary: Negative for frequency, hematuria and urgency.   Skin: Negative  for pallor, rash and wound.   Neurological: Negative for light-headedness and headaches.   Psychiatric/Behavioral: Positive for dysphoric mood and sleep disturbance. Negative for confusion and decreased concentration. The patient is nervous/anxious.      Objective:     Vital Signs (Most Recent):  Temp: 98.9 °F (37.2 °C) (10/13/19 1818)  Pulse: 77 (10/13/19 1818)  Resp: 17 (10/13/19 1818)  BP: (!) 147/86 (10/13/19 1818)  SpO2: (!) 92 % (10/13/19 1818) Vital Signs (24h Range):  Temp:  [98.8 °F (37.1 °C)-99.1 °F (37.3 °C)] 98.9 °F (37.2 °C)  Pulse:  [68-96] 77  Resp:  [15-18] 17  SpO2:  [92 %-100 %] 92 %  BP: (136-174)/() 147/86     Weight: 79 kg (174 lb 2.6 oz)  Body mass index is 22.36 kg/m².    Physical Exam   Constitutional: He is oriented to person, place, and time. He appears well-developed and well-nourished. No distress.   HENT:   Head: Normocephalic and atraumatic.   Right Ear: External ear normal.   Left Ear: External ear normal.   Nose: Nose normal.   Mouth/Throat: Oropharynx is clear and moist.   Eyes: Pupils are equal, round, and reactive to light. Conjunctivae and EOM are normal.   Neck: Normal range of motion. Neck supple.   Cardiovascular: Normal rate, regular rhythm and intact distal pulses.   Pulmonary/Chest: Effort normal and breath sounds normal. No respiratory distress. He has no wheezes. He has no rales.   Abdominal: Soft. Bowel sounds are normal. He exhibits no distension. There is no tenderness.   No palpable hepatomegaly or splenomegaly   Musculoskeletal: Normal range of motion. He exhibits no edema or tenderness.   Neurological: He is alert and oriented to person, place, and time.   Skin: Skin is warm and dry.   Psychiatric: Thought content normal. His speech is rapid and/or pressured. He exhibits a depressed mood. He expresses no homicidal and no suicidal ideation. He expresses no suicidal plans and no homicidal plans.   Nursing note and vitals reviewed.        CRANIAL NERVES     CN  III, IV, VI   Pupils are equal, round, and reactive to light.  Extraocular motions are normal.        Significant Labs: All pertinent labs within the past 24 hours have been reviewed.    Significant Imaging: I have reviewed all pertinent imaging results/findings within the past 24 hours.

## 2019-10-14 NOTE — NURSING
Patient returned to room from US via . Patient awake, alert, oriented. Tele monitoring in progress. No apparent distress noted at this time.

## 2019-10-14 NOTE — NURSING
Discharge instructions given to patient at bedside. Patient verbalized understanding of instructions. Patient states willingness to comply. Saline lock removed. Pt states his wife is on her way.

## 2019-10-14 NOTE — HOSPITAL COURSE
Jayson Berkowitz presents standing in the room. He ambulates with a steady gait to the bed and sits down for the interview. He his awake and alert and oriented to person, place, time and situation. His speech is clear with good articulation at a normal rate and volume. His speech speeds up as he talks about things that upset him but is otherwise free of pressure and latency. His affect is concerned. He cries initially but this subsides as he expresses his psychosocial concerns. He states that he is estranged from his wife who has 4 children with him. He states he is living with his girlfriend and their baby. He also has two other children with other women. He states that he is stressed because he is only making $12 an hour and he needs a better job. He states that he tries to work other part time jobs to make ends meet. He states that the mothers of his children do not get along and he pays child support but is not always allowed to see his children. He states that he smokes Mojo daily and he admits that he stated that he was smoking Mojo to die. He states that he does not want to kill himself but he would have been alright with dying at that time. He identifies supporting his children as a protective factor against suicide. He can contract for safety. He states that he was feeling this way because of the stressors in his life but he really doesn't want to die. He admits that he had smoked a lot of Mojo when he was feeling this hopelessness but these feelings have subsided. He states he would never kill himself no matter how things get because of his children. He states that he would not have a problem with going to rehabilitation if it didn't interfere with him going to work. He states that he wants to go back to work and does not want to lose his job. He identifies his children, the need to support his children and his job as protective factors and he can contract for safety. Jayson Berkowitz is not suicidal, homicidal or  gravely disabled from a mental health prospective and therefore, does not meet the criteria for a PEC.    His overall symptom complex includes: daily mojo use, crying, multiple psychosocial stressors, visual hallucinations, feels sad, feelings of worthlessness/hopelessness with heavy mojo use,  periods of energy without sleep, history of spending sprees, all symptoms started after use of Mojo.

## 2019-10-14 NOTE — NURSING
Patient to  US via wc as ordered. Patient awake, alert, oriented without c/o discomfort at this time. Pt no c/o nausea at this time. No apparent distress noted.

## 2019-10-14 NOTE — DISCHARGE SUMMARY
[facility] Hospital Medicine  Discharge Summary      Patient Name: Jayson Berkowitz  MRN: [id number]  Admission Date: 10/13/2019  Hospital Length of Stay: 0 days  Discharge Date and Time:  10/14/2019 2:43 PM  Attending Physician: Mamie Lockwood MD   Discharging Provider: PHU Ann  Primary Care Provider: Primary Doctor No      HPI:   44 y.o. male with gastritis and synthetic marijuana use presents with complaint nausea and vomiting for the past 4 days.  Associated with left upper quadrant abdominal pain and poor p.o. intake.  Attempted self treatment at home with promethazine tablets without relief.  He denies fever, chills, cough, SOB, chest pain, hematemesis, coffee-ground emesis, bloody or black stools, or dysuria.  He reports intermittent episodes of similar symptoms dating back to 2008.  Smokes synthetic marijuana almost daily to cope with feelings of depression and anxiety because it helps him sleep.  States that sometimes he has thoughts of death and wishes that he would die, but denies any current SI/HI or any plan of hurting himself.  In the ED, routine labs revealed mild hypokalemia and elevated lipase 173.  He received a number of antiemetics, but was still unable to tolerate p.o. intake.  Placed in observation for further evaluation and treatment.    * No surgery found *      Hospital Course:   44 y.o. AAM who is an admitted mojo abuser, who presented with complaint of NV, and abdominal pain placed in observation. He was noted to be profoundly dehydrated which he admits happens to him when he is coming down off mo will. He was rehydrated with IV fluids and monitored overnight. His creatine normalized, calcium normalized. His lipase was elevated over 600 denies drinking history??? - trended downward after fluids. Abdominal US was obtained and was unremarkable except for mass in R hepatic lobe likely representing a hemangioma. He does not appear juandice ALT/AST normal.  Although no tox screen was obtained in the ED at the time of presentation, he has been in and out of the shower all day which is indicative of his marij & mojo use. He has asked for food, been able to tolerate an advanced diet and has not requested anything for pain. He has positive bowel sounds X 4 quads and no acute abdomen is appreciated. He was seen by Psych due to depressive behavior of crying and stating that he was overly stressed due to multiple children with multiple baby momma's. Nurses report one women stayed in the room with a baby all night with him. Psych noted that the patient did not display any gravely disabled symptoms and did not recommend any further medication or treatment. His vitals are normal and and will discharge to home.     Consults:   Consults (From admission, onward)        Status Ordering Provider     Inpatient consult to Psychiatry  Once     Provider:  Basim Tran MD    Completed BRENT HANNON JR     IP consult to case management  Once     Provider:  (Not yet assigned)    Acknowledged NIDA BEVERLY          No new Assessment & Plan notes have been filed under this hospital service since the last note was generated.  Service: Hospital Medicine    Final Active Diagnoses:    Diagnosis Date Noted POA    PRINCIPAL PROBLEM:  Acute pancreatitis [K85.90] 10/14/2019 Yes    Depression [F32.9] 10/13/2019 Yes    Intractable vomiting with nausea [R11.2] 01/31/2016 Yes    Hypokalemia [E87.6] 04/21/2015 Yes    Essential hypertension [I10] 10/13/2019 Yes     Chronic    Abdominal pain [R10.9] 01/31/2016 Yes    Substance induced mood disorder [F19.94] 10/14/2019 Yes      Problems Resolved During this Admission:       Discharged Condition: stable    Disposition: Home or Self Care    Follow Up:  Follow-up Information     [facility].    Why:  please call at time of discharge to follow up post hospital discharge also to establish care for future medical  needs  Contact information:  [address] [place] [address]  [phone]                 Patient Instructions:      Diet NPO     Activity as tolerated       Significant Diagnostic Studies: Labs:   CMP   Recent Labs   Lab 10/13/19  1439 10/14/19  0316    142   K 3.1* 3.4*   CL 98 103   CO2 27 29    108   BUN 26* 20   CREATININE 1.2 1.2   CALCIUM 10.5 9.4   PROT 8.5* 7.5   ALBUMIN 4.9 4.2   BILITOT 1.1* 1.2*   ALKPHOS 99 94   AST 18 15   ALT 16 14   ANIONGAP 14 10   ESTGFRAFRICA >60 >60   EGFRNONAA >60 >60   , CBC   Recent Labs   Lab 10/13/19  1439   WBC 7.78   HGB 16.9   HCT 49.2      , INR No results found for: INR, PROTIME, Lipid Panel No results found for: CHOL, HDL, LDLCALC, TRIG, CHOLHDL, Troponin No results for input(s): TROPONINI in the last 168 hours. and A1C: No results for input(s): HGBA1C in the last 4320 hours.    Pending Diagnostic Studies:     Procedure Component Value Units Date/Time    Hepatitis panel, acute [[id number]] Collected:  10/14/19 1014    Order Status:  Sent Lab Status:  In process Updated:  10/14/19 1014    Specimen:  Blood          Medications:  Reconciled Home Medications:      Medication List      CONTINUE taking these medications    amLODIPine 10 MG tablet  Commonly known as:  NORVASC  Take 1 tablet (10 mg total) by mouth once daily.     ondansetron 4 MG Tbdl  Commonly known as:  ZOFRAN-ODT  Take 1 tablet (4 mg total) by mouth every 6 (six) hours as needed.     potassium chloride 20 mEq  Commonly known as:  K-TAB  Take 1 tablet (20 mEq total) by mouth once daily. for 4 days     * promethazine 25 MG tablet  Commonly known as:  PHENERGAN  Take 1 tablet (25 mg total) by mouth every 6 (six) hours as needed for Nausea.     * promethazine 25 MG suppository  Commonly known as:  PHENERGAN  Place 1 suppository (25 mg total) rectally every 6 (six) hours as needed for Nausea.         * This list has 2 medication(s) that are the same as other medications prescribed for you.  Read the directions carefully, and ask your doctor or other care provider to review them with you.                Indwelling Lines/Drains at time of discharge:   Lines/Drains/Airways     None                 Time spent on the discharge of patient: 15 minutes  Patient was seen and examined on the date of discharge and determined to be suitable for discharge.       JORGE Petersen, FNP-C  Hospitalist - Department of Hospital Medicine  [address], [place] [address]  Office [phone]; Pager [phone]

## 2019-10-14 NOTE — NURSING
Pt tolerated a couple juices and some broth from breakfast clear liquid tray. Currently no c/o nausea and no (0/10) pain.

## 2019-10-14 NOTE — PLAN OF CARE
10/14/19 1149   Discharge Assessment   Assessment Type Discharge Planning Assessment   Assessment information obtained from? Medical Record   Prior to hospitilization cognitive status: Alert/Oriented   Prior to hospitalization functional status: Independent   Current cognitive status: Alert/Oriented   Current Functional Status: Independent   Facility Arrived From: home   Lives With other (see comments)   Able to Return to Prior Arrangements yes   Is patient able to care for self after discharge? Yes   Who are your caregiver(s) and their phone number(s)? [facility]- [phone]   Patient's perception of discharge disposition home or selfcare   Readmission Within the Last 30 Days no previous admission in last 30 days   Patient currently being followed by outpatient case management? No   Patient currently receives any other outside agency services? No   Equipment Currently Used at Home none   Do you have any problems affording any of your prescribed medications? No   Is the patient taking medications as prescribed? yes   Does the patient have transportation home? Yes   Transportation Anticipated family or friend will provide   Does the patient receive services at the [facility]? No   Discharge Plan A Home with family  (with [facility] )   Patient/Family in Agreement with Plan yes     [id number] [place] - [address]  [address] [address]  Phone: [phone] Fax: [phone]

## 2019-10-14 NOTE — NURSING
"Rounding on pt. Pt crouched side of bed crying with lunch tray at bedside. Pt had drunk cup of sprite and ate a bite or two of lunch tray. Shower running. Pt states his stomach hurts after drinking cold liquid. Pt says "It doesn't hurt with the food, just the cold drink!" Nurse offered pain medication to pt, pt states "I don't like medicine. I want to try the shower." nurse informed pt that psychiatry NP is here to see him and would he delay shower, wait to talk to NP. Pt agrees.   "

## 2019-10-14 NOTE — HPI
44 y.o. male with gastritis and synthetic marijuana use presents with complaint nausea and vomiting for the past 4 days.  Associated with left upper quadrant abdominal pain and poor p.o. intake.  Attempted self treatment at home with promethazine tablets without relief.  He denies fever, chills, cough, SOB, chest pain, hematemesis, coffee-ground emesis, bloody or black stools, or dysuria.  He reports intermittent episodes of similar symptoms dating back to 2008.  Smokes synthetic marijuana almost daily to cope with feelings of depression and anxiety because it helps him sleep.  States that sometimes he has thoughts of death and wishes that he would die, but denies any current SI/HI or any plan of hurting himself.  In the ED, routine labs revealed mild hypokalemia and elevated lipase 173.  He received a number of antiemetics, but was still unable to tolerate p.o. intake.  Placed in observation for further evaluation and treatment.

## 2019-10-14 NOTE — ASSESSMENT & PLAN NOTE
Many recurrent episodes of the same in the past attributed to synthetic marijuana use, appears overall stable but unable to tolerate p.o. intake at this time.  -continue supportive care with IV fluids and antiemetics as needed  -NPO  -repeat lipase in a.m.
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
## 2019-10-14 NOTE — NURSING
PER handoff received from RAMONA Burns RN. Responds spontaneously and is AAO x4. Denies having any pain and appears in no distress. IV magnesium and IV fluids are infusing per MD order. Assessment completed per Doc Flowsheets; reference if needed. Fall and safety precautions maintained. Bed alarm activated and audible. Bed locked in lowest position, with side rails up x2. Call bell and personal items within reach. Will continue to monitor pt for any changes.

## 2019-10-15 LAB
HAV IGM SERPL QL IA: NEGATIVE
HBV CORE IGM SERPL QL IA: NEGATIVE
HBV SURFACE AG SERPL QL IA: NEGATIVE
HCV AB SERPL QL IA: NEGATIVE

## 2019-11-21 ENCOUNTER — HOSPITAL ENCOUNTER (OUTPATIENT)
Facility: HOSPITAL | Age: 44
Discharge: HOME | End: 2019-11-22
Attending: EMERGENCY MEDICINE | Admitting: EMERGENCY MEDICINE
Payer: MEDICAID

## 2019-11-21 DIAGNOSIS — N17.9 ACUTE KIDNEY INJURY: Primary | ICD-10-CM

## 2019-11-21 DIAGNOSIS — R74.8 ELEVATED LIPASE: ICD-10-CM

## 2019-11-21 DIAGNOSIS — E87.6 HYPOKALEMIA: ICD-10-CM

## 2019-11-21 DIAGNOSIS — N50.812 LEFT TESTICULAR PAIN: ICD-10-CM

## 2019-11-21 DIAGNOSIS — F19.10 DRUG ABUSE: ICD-10-CM

## 2019-11-21 DIAGNOSIS — E86.0 DEHYDRATION: ICD-10-CM

## 2019-11-21 DIAGNOSIS — R11.2 INTRACTABLE VOMITING WITH NAUSEA, UNSPECIFIED VOMITING TYPE: ICD-10-CM

## 2019-11-21 PROBLEM — K85.90 ACUTE PANCREATITIS: Status: RESOLVED | Noted: 2019-10-14 | Resolved: 2019-11-21

## 2019-11-21 LAB
ALBUMIN SERPL BCP-MCNC: 5 G/DL (ref 3.5–5.2)
ALP SERPL-CCNC: 120 U/L (ref 55–135)
ALT SERPL W/O P-5'-P-CCNC: 26 U/L (ref 10–44)
AMPHET+METHAMPHET UR QL: NEGATIVE
ANION GAP SERPL CALC-SCNC: 11 MMOL/L (ref 8–16)
ANION GAP SERPL CALC-SCNC: 16 MMOL/L (ref 8–16)
AST SERPL-CCNC: 24 U/L (ref 10–40)
BACTERIA #/AREA URNS HPF: ABNORMAL /HPF
BARBITURATES UR QL SCN>200 NG/ML: NEGATIVE
BASOPHILS # BLD AUTO: 0.03 K/UL (ref 0–0.2)
BASOPHILS NFR BLD: 0.3 % (ref 0–1.9)
BENZODIAZ UR QL SCN>200 NG/ML: NEGATIVE
BILIRUB SERPL-MCNC: 0.8 MG/DL (ref 0.1–1)
BILIRUB UR QL STRIP: NEGATIVE
BUN SERPL-MCNC: 29 MG/DL (ref 6–30)
BUN SERPL-MCNC: 35 MG/DL (ref 6–20)
BZE UR QL SCN: NEGATIVE
CALCIUM SERPL-MCNC: 9.9 MG/DL (ref 8.7–10.5)
CANNABINOIDS UR QL SCN: NEGATIVE
CHLORIDE SERPL-SCNC: 103 MMOL/L (ref 95–110)
CHLORIDE SERPL-SCNC: 96 MMOL/L (ref 95–110)
CK SERPL-CCNC: 760 U/L (ref 20–200)
CLARITY UR: ABNORMAL
CO2 SERPL-SCNC: 30 MMOL/L (ref 23–29)
COLOR UR: YELLOW
CREAT SERPL-MCNC: 1.9 MG/DL (ref 0.5–1.4)
CREAT SERPL-MCNC: 2.5 MG/DL (ref 0.5–1.4)
CREAT UR-MCNC: >450 MG/DL (ref 23–375)
DIFFERENTIAL METHOD: NORMAL
EOSINOPHIL # BLD AUTO: 0 K/UL (ref 0–0.5)
EOSINOPHIL NFR BLD: 0.4 % (ref 0–8)
ERYTHROCYTE [DISTWIDTH] IN BLOOD BY AUTOMATED COUNT: 14.5 % (ref 11.5–14.5)
EST. GFR  (AFRICAN AMERICAN): 35 ML/MIN/1.73 M^2
EST. GFR  (NON AFRICAN AMERICAN): 31 ML/MIN/1.73 M^2
GLUCOSE SERPL-MCNC: 110 MG/DL (ref 70–110)
GLUCOSE SERPL-MCNC: 113 MG/DL (ref 70–110)
GLUCOSE UR QL STRIP: NEGATIVE
HCT VFR BLD AUTO: 51.3 % (ref 40–54)
HCT VFR BLD CALC: 45 %PCV (ref 36–54)
HGB BLD-MCNC: 17.4 G/DL (ref 14–18)
HGB UR QL STRIP: ABNORMAL
HYALINE CASTS #/AREA URNS LPF: 45 /LPF
IMM GRANULOCYTES # BLD AUTO: 0.02 K/UL (ref 0–0.04)
IMM GRANULOCYTES NFR BLD AUTO: 0.2 % (ref 0–0.5)
KETONES UR QL STRIP: ABNORMAL
LEUKOCYTE ESTERASE UR QL STRIP: NEGATIVE
LIPASE SERPL-CCNC: 113 U/L (ref 4–60)
LYMPHOCYTES # BLD AUTO: 2.3 K/UL (ref 1–4.8)
LYMPHOCYTES NFR BLD: 24.3 % (ref 18–48)
MCH RBC QN AUTO: 30.1 PG (ref 27–31)
MCHC RBC AUTO-ENTMCNC: 33.9 G/DL (ref 32–36)
MCV RBC AUTO: 89 FL (ref 82–98)
METHADONE UR QL SCN>300 NG/ML: NEGATIVE
MICROSCOPIC COMMENT: ABNORMAL
MONOCYTES # BLD AUTO: 0.9 K/UL (ref 0.3–1)
MONOCYTES NFR BLD: 9.6 % (ref 4–15)
NEUTROPHILS # BLD AUTO: 6.1 K/UL (ref 1.8–7.7)
NEUTROPHILS NFR BLD: 65.2 % (ref 38–73)
NITRITE UR QL STRIP: NEGATIVE
NRBC BLD-RTO: 0 /100 WBC
OPIATES UR QL SCN: NEGATIVE
PCP UR QL SCN>25 NG/ML: NEGATIVE
PH UR STRIP: 5 [PH] (ref 5–8)
PLATELET # BLD AUTO: 324 K/UL (ref 150–350)
PMV BLD AUTO: 10 FL (ref 9.2–12.9)
POC IONIZED CALCIUM: 1.1 MMOL/L (ref 1.06–1.42)
POC TCO2 (MEASURED): 25 MMOL/L (ref 23–29)
POTASSIUM BLD-SCNC: 3 MMOL/L (ref 3.5–5.1)
POTASSIUM SERPL-SCNC: 3.4 MMOL/L (ref 3.5–5.1)
PROT SERPL-MCNC: 9 G/DL (ref 6–8.4)
PROT UR QL STRIP: ABNORMAL
RBC # BLD AUTO: 5.78 M/UL (ref 4.6–6.2)
RBC #/AREA URNS HPF: 5 /HPF (ref 0–4)
SAMPLE: ABNORMAL
SODIUM BLD-SCNC: 140 MMOL/L (ref 136–145)
SODIUM SERPL-SCNC: 137 MMOL/L (ref 136–145)
SP GR UR STRIP: 1.02 (ref 1–1.03)
SQUAMOUS #/AREA URNS HPF: 2 /HPF
TOXICOLOGY INFORMATION: ABNORMAL
URN SPEC COLLECT METH UR: ABNORMAL
UROBILINOGEN UR STRIP-ACNC: NEGATIVE EU/DL
WBC # BLD AUTO: 9.38 K/UL (ref 3.9–12.7)
WBC #/AREA URNS HPF: 8 /HPF (ref 0–5)

## 2019-11-21 PROCEDURE — 81000 URINALYSIS NONAUTO W/SCOPE: CPT

## 2019-11-21 PROCEDURE — 80053 COMPREHEN METABOLIC PANEL: CPT

## 2019-11-21 PROCEDURE — 82550 ASSAY OF CK (CPK): CPT

## 2019-11-21 PROCEDURE — 63600175 PHARM REV CODE 636 W HCPCS: Performed by: PHYSICIAN ASSISTANT

## 2019-11-21 PROCEDURE — 96361 HYDRATE IV INFUSION ADD-ON: CPT

## 2019-11-21 PROCEDURE — 25000003 PHARM REV CODE 250: Performed by: HOSPITALIST

## 2019-11-21 PROCEDURE — 82330 ASSAY OF CALCIUM: CPT

## 2019-11-21 PROCEDURE — 96375 TX/PRO/DX INJ NEW DRUG ADDON: CPT

## 2019-11-21 PROCEDURE — 84132 ASSAY OF SERUM POTASSIUM: CPT

## 2019-11-21 PROCEDURE — G0378 HOSPITAL OBSERVATION PER HR: HCPCS

## 2019-11-21 PROCEDURE — 82565 ASSAY OF CREATININE: CPT

## 2019-11-21 PROCEDURE — 96374 THER/PROPH/DIAG INJ IV PUSH: CPT

## 2019-11-21 PROCEDURE — 25000003 PHARM REV CODE 250: Performed by: NURSE PRACTITIONER

## 2019-11-21 PROCEDURE — 85014 HEMATOCRIT: CPT

## 2019-11-21 PROCEDURE — 99285 EMERGENCY DEPT VISIT HI MDM: CPT | Mod: 25

## 2019-11-21 PROCEDURE — 63600175 PHARM REV CODE 636 W HCPCS: Performed by: HOSPITALIST

## 2019-11-21 PROCEDURE — 96376 TX/PRO/DX INJ SAME DRUG ADON: CPT

## 2019-11-21 PROCEDURE — 85025 COMPLETE CBC W/AUTO DIFF WBC: CPT

## 2019-11-21 PROCEDURE — 63600175 PHARM REV CODE 636 W HCPCS: Performed by: NURSE PRACTITIONER

## 2019-11-21 PROCEDURE — 84295 ASSAY OF SERUM SODIUM: CPT | Mod: 91

## 2019-11-21 PROCEDURE — 80307 DRUG TEST PRSMV CHEM ANLYZR: CPT

## 2019-11-21 PROCEDURE — 99900035 HC TECH TIME PER 15 MIN (STAT)

## 2019-11-21 PROCEDURE — 83690 ASSAY OF LIPASE: CPT

## 2019-11-21 RX ORDER — ONDANSETRON 4 MG/1
4 TABLET, FILM COATED ORAL EVERY 6 HOURS PRN
Qty: 12 TABLET | Refills: 0 | Status: SHIPPED | OUTPATIENT
Start: 2019-11-21 | End: 2019-11-21 | Stop reason: ALTCHOICE

## 2019-11-21 RX ORDER — ONDANSETRON 2 MG/ML
4 INJECTION INTRAMUSCULAR; INTRAVENOUS EVERY 6 HOURS PRN
Status: DISCONTINUED | OUTPATIENT
Start: 2019-11-21 | End: 2019-11-22 | Stop reason: HOSPADM

## 2019-11-21 RX ORDER — PANTOPRAZOLE SODIUM 40 MG/1
40 TABLET, DELAYED RELEASE ORAL DAILY
Status: DISCONTINUED | OUTPATIENT
Start: 2019-11-21 | End: 2019-11-22 | Stop reason: HOSPADM

## 2019-11-21 RX ORDER — SODIUM CHLORIDE 0.9 % (FLUSH) 0.9 %
10 SYRINGE (ML) INJECTION
Status: DISCONTINUED | OUTPATIENT
Start: 2019-11-21 | End: 2019-11-22 | Stop reason: HOSPADM

## 2019-11-21 RX ORDER — ACETAMINOPHEN 325 MG/1
650 TABLET ORAL EVERY 8 HOURS PRN
Status: DISCONTINUED | OUTPATIENT
Start: 2019-11-21 | End: 2019-11-21

## 2019-11-21 RX ORDER — AMLODIPINE BESYLATE 5 MG/1
10 TABLET ORAL DAILY
Status: DISCONTINUED | OUTPATIENT
Start: 2019-11-21 | End: 2019-11-22 | Stop reason: HOSPADM

## 2019-11-21 RX ORDER — ONDANSETRON 2 MG/ML
4 INJECTION INTRAMUSCULAR; INTRAVENOUS
Status: COMPLETED | OUTPATIENT
Start: 2019-11-21 | End: 2019-11-21

## 2019-11-21 RX ORDER — TRAZODONE HYDROCHLORIDE 50 MG/1
50 TABLET ORAL NIGHTLY
Status: DISCONTINUED | OUTPATIENT
Start: 2019-11-21 | End: 2019-11-22 | Stop reason: HOSPADM

## 2019-11-21 RX ORDER — PROCHLORPERAZINE EDISYLATE 5 MG/ML
10 INJECTION INTRAMUSCULAR; INTRAVENOUS EVERY 6 HOURS PRN
Status: DISCONTINUED | OUTPATIENT
Start: 2019-11-21 | End: 2019-11-22 | Stop reason: HOSPADM

## 2019-11-21 RX ORDER — DICYCLOMINE HYDROCHLORIDE 10 MG/1
10 CAPSULE ORAL 4 TIMES DAILY
Status: DISCONTINUED | OUTPATIENT
Start: 2019-11-21 | End: 2019-11-22

## 2019-11-21 RX ORDER — SODIUM CHLORIDE 9 MG/ML
INJECTION, SOLUTION INTRAVENOUS CONTINUOUS
Status: DISCONTINUED | OUTPATIENT
Start: 2019-11-21 | End: 2019-11-22 | Stop reason: HOSPADM

## 2019-11-21 RX ORDER — ONDANSETRON 2 MG/ML
4 INJECTION INTRAMUSCULAR; INTRAVENOUS EVERY 8 HOURS PRN
Status: DISCONTINUED | OUTPATIENT
Start: 2019-11-21 | End: 2019-11-21

## 2019-11-21 RX ORDER — PROMETHAZINE HYDROCHLORIDE 25 MG/1
12.5 TABLET ORAL EVERY 6 HOURS PRN
Qty: 10 TABLET | Refills: 0 | Status: SHIPPED | OUTPATIENT
Start: 2019-11-21 | End: 2019-11-21 | Stop reason: ALTCHOICE

## 2019-11-21 RX ORDER — ACETAMINOPHEN 325 MG/1
650 TABLET ORAL EVERY 6 HOURS PRN
Status: DISCONTINUED | OUTPATIENT
Start: 2019-11-21 | End: 2019-11-22 | Stop reason: HOSPADM

## 2019-11-21 RX ORDER — POTASSIUM CHLORIDE 20 MEQ/15ML
40 SOLUTION ORAL
Status: COMPLETED | OUTPATIENT
Start: 2019-11-21 | End: 2019-11-21

## 2019-11-21 RX ADMIN — PROCHLORPERAZINE EDISYLATE 10 MG: 5 INJECTION INTRAMUSCULAR; INTRAVENOUS at 09:11

## 2019-11-21 RX ADMIN — DICYCLOMINE HYDROCHLORIDE 10 MG: 10 CAPSULE ORAL at 10:11

## 2019-11-21 RX ADMIN — ONDANSETRON HYDROCHLORIDE 4 MG: 2 SOLUTION INTRAMUSCULAR; INTRAVENOUS at 08:11

## 2019-11-21 RX ADMIN — SODIUM CHLORIDE 1000 ML: 0.9 INJECTION, SOLUTION INTRAVENOUS at 01:11

## 2019-11-21 RX ADMIN — PANTOPRAZOLE SODIUM 40 MG: 40 TABLET, DELAYED RELEASE ORAL at 07:11

## 2019-11-21 RX ADMIN — POTASSIUM CHLORIDE 40 MEQ: 20 SOLUTION ORAL at 05:11

## 2019-11-21 RX ADMIN — TRAZODONE HYDROCHLORIDE 50 MG: 50 TABLET ORAL at 10:11

## 2019-11-21 RX ADMIN — SODIUM CHLORIDE: 0.9 INJECTION, SOLUTION INTRAVENOUS at 07:11

## 2019-11-21 RX ADMIN — PROMETHAZINE HYDROCHLORIDE 12.5 MG: 25 INJECTION INTRAMUSCULAR; INTRAVENOUS at 05:11

## 2019-11-21 RX ADMIN — SODIUM CHLORIDE 1000 ML: 9 INJECTION, SOLUTION INTRAVENOUS at 02:11

## 2019-11-21 RX ADMIN — AMLODIPINE BESYLATE 10 MG: 5 TABLET ORAL at 07:11

## 2019-11-21 RX ADMIN — ONDANSETRON HYDROCHLORIDE 4 MG: 2 SOLUTION INTRAMUSCULAR; INTRAVENOUS at 01:11

## 2019-11-21 RX ADMIN — SODIUM CHLORIDE 1000 ML: 0.9 INJECTION, SOLUTION INTRAVENOUS at 03:11

## 2019-11-21 NOTE — ED PROVIDER NOTES
Encounter Date: 11/21/2019    SCRIBE #1 NOTE: I, Sana Bowling, am scribing for, and in the presence of,  Haydee Aquino NP . I have scribed the entire note. Other sections scribed: SARAH POLO.       History     Chief Complaint   Patient presents with    Vomiting     Pt with multiple episodes of vomiting x 3 days. Denies diarrhea. Reports lower abdominal pain. Denies dysuria and penile discharge      CC: emesis    This is a 44 y.o. Male smoker with a PMHX of GERD, Hypertension and substance abuse who presents to the ED complaining of nausea and vomiting (20 episodes/daily) for 3 days. He reports associated symptoms of fatigue, decrease appetite and intermittent left testicular pressure pain for 1 month . He denies fever, chills, penile discharge, dysuria, frequency, hematuria or chest pain. He notes his last bowel movement was 2 days ago. He reports that he smokes Mojo daily. He states he smoked Mojo this morning. No alleviating factors. No prior treatment.  He denies alcohol use.    The history is provided by the patient. No  was used.     Review of patient's allergies indicates:  No Known Allergies  Past Medical History:   Diagnosis Date    Addiction to drug     Gastritis     GERD (gastroesophageal reflux disease)     Hallucination     Hypertension     Renal disorder     Sleep difficulties     Substance abuse      History reviewed. No pertinent surgical history.  Family History   Problem Relation Age of Onset    Arthritis Mother     Hearing loss Father     Crohn's disease Sister      Social History     Tobacco Use    Smoking status: Current Some Day Smoker     Packs/day: 0.25     Years: 3.00     Pack years: 0.75    Smokeless tobacco: Never Used   Substance Use Topics    Alcohol use: Not Currently    Drug use: Yes     Comment: mojo daily     Review of Systems   Constitutional: Positive for appetite change (decrease). Negative for chills, fatigue and fever.   HENT: Negative for  trouble swallowing.    Respiratory: Negative for cough.    Cardiovascular: Negative for chest pain.   Gastrointestinal: Positive for vomiting. Negative for nausea.   Genitourinary: Positive for testicular pain (left). Negative for discharge, dysuria, frequency and hematuria.   Neurological: Negative for headaches.       Physical Exam     Initial Vitals [11/21/19 1317]   BP Pulse Resp Temp SpO2   (!) 131/96 80 18 99.2 °F (37.3 °C) 99 %      MAP       --         Physical Exam    Nursing note and vitals reviewed.  Constitutional: He appears well-developed and well-nourished.   HENT:   Head: Normocephalic and atraumatic.   Eyes: Conjunctivae and EOM are normal. Pupils are equal, round, and reactive to light.   Neck: Normal range of motion. Neck supple.   Cardiovascular: Normal rate, regular rhythm and normal heart sounds.   No murmur heard.  Pulmonary/Chest: Breath sounds normal. No respiratory distress.   Abdominal: Normal appearance and bowel sounds are normal. There is no hepatosplenomegaly. There is no tenderness. There is no rigidity, no rebound, no guarding, no CVA tenderness, no tenderness at McBurney's point and negative Brambila's sign. No hernia. Hernia confirmed negative in the right inguinal area and confirmed negative in the left inguinal area.   Genitourinary: Testes normal and penis normal. Cremasteric reflex is present. Right testis shows no mass and no tenderness. Left testis shows no mass and no tenderness. Uncircumcised.   Genitourinary Comments:  exam chaperoned by CHLOE Oneal.   Musculoskeletal: Normal range of motion. He exhibits no edema.   Lymphadenopathy: No inguinal adenopathy noted on the right or left side.   Neurological: He is alert and oriented to person, place, and time.   Skin: Skin is warm and dry. Capillary refill takes less than 2 seconds. No erythema.   Psychiatric: His behavior is normal.         ED Course   Procedures  Labs Reviewed   COMPREHENSIVE METABOLIC PANEL - Abnormal;  Notable for the following components:       Result Value    Potassium 3.4 (*)     CO2 30 (*)     Glucose 113 (*)     BUN, Bld 35 (*)     Creatinine 2.5 (*)     Total Protein 9.0 (*)     eGFR if  35 (*)     eGFR if non  31 (*)     All other components within normal limits    Narrative:     Recoll. [id number] by [facility] at 11/21/2019 14:04, reason: Specimen   hemolyzed,Tube has been refrigerated. called to Matt Borges   11/21/2019  14:04   LIPASE - Abnormal; Notable for the following components:    Lipase 113 (*)     All other components within normal limits    Narrative:     Recoll. [id number] by [facility] at 11/21/2019 14:04, reason: Specimen   hemolyzed,Tube has been refrigerated. called to Matt Borges   11/21/2019  14:04   URINALYSIS, REFLEX TO URINE CULTURE - Abnormal; Notable for the following components:    Appearance, UA Cloudy (*)     Protein, UA 2+ (*)     Ketones, UA Trace (*)     Occult Blood UA 2+ (*)     All other components within normal limits    Narrative:     Preferred Collection Type->Urine, Clean Catch   DRUG SCREEN PANEL, URINE EMERGENCY - Abnormal; Notable for the following components:    Creatinine, Random Ur >450.0 (*)     All other components within normal limits    Narrative:     Preferred Collection Type->Urine, Clean Catch   URINALYSIS MICROSCOPIC - Abnormal; Notable for the following components:    RBC, UA 5 (*)     WBC, UA 8 (*)     Bacteria Few (*)     Hyaline Casts, UA 45 (*)     All other components within normal limits    Narrative:     Preferred Collection Type->Urine, Clean Catch   ISTAT PROCEDURE - Abnormal; Notable for the following components:    POC Creatinine 1.9 (*)     POC Potassium 3.0 (*)     All other components within normal limits   C. TRACHOMATIS/N. GONORRHOEAE BY AMP DNA   CBC W/ AUTO DIFFERENTIAL   CK   ISTAT CHEM8          Imaging Results          US Scrotum And Testicles (Final result)  Result time 11/21/19 14:52:34    Final result by  Xander Chau MD (11/21/19 14:52:34)                 Impression:      Normal bilateral testicles and epididymi.      Electronically signed by: Xander Chau  Date:    11/21/2019  Time:    14:52             Narrative:    EXAMINATION:  US SCROTUM AND TESTICLES    CLINICAL HISTORY:  Left testicular pain    TECHNIQUE:  Sonography of the scrotum and testes.    COMPARISON:  None.    FINDINGS:  Bilateral testicles show symmetric echogenicity and color Doppler flow.    The right testicle measures 4.4 by 2.4 x 2.5 cm and appears normal with normal color Doppler flow and normal spectral arterial and venous waveforms.  The right epididymis appears overall normal.  There is a 3.9 mm cyst of the right epididymal head with normal color Doppler flow of the right epididymal head and tail.  Spermatic cord with Valsalva shows normal vessels.  No hydrocele.    The left epididymal head appears normal with normal color Doppler flow.  The left testicle appears normal in echogenicity and architecture and measures 4.7 by 2.4 x 2.9 cm.  There is normal color Doppler flow of the left testicle with normal spectral arterial and venous waveforms.  No hydrocele.                                 Medical Decision Making:   ED Management:  This is a 44-year-old male with history of acute pancreatitis, hypertension, drug abuse presenting for evaluation of 3 days of nausea and vomiting. He admits daily Mojo use.  No alcohol use.  He reports generalized abdominal pain and left testicular pain.  Abdomen is soft and nontender without any guarding or rigidity.  Doubt acute intra-abdominal process.  No abnormality detected on  exam.  Testicular Ultrasound normal. Blood work significant for acute kidney injury likely secondary to dehydration.  Patient has been given 3 L of IV fluids.  Reports resolution in abdominal pain. No longer vomiting.  Lipase is elevated at 113.  No left upper quadrant abdominal pain.  He has not drink alcohol.  Abdomen is soft  nontender.  Doubt acute pancreatitis.  Case was discussed with observation nurse practitioner, Jameel Gonzalez who would like to recheck creatinine for improvement.  Recheck creatinine is improved at 1.9. Potassium 3.0.  Attempted to replace oral potassium.  Patient became nauseated and began vomiting again.  He is tearful and would like to be admitted.  I feel this is acceptable as he is not tolerating oral intake and has acute kidney injury. Patient placed in observation service for IV rehydration and antiemetics.  Patient is agreeable to plan of care.      This case was discussed with my attending physician who agrees with my plan of care.            Scribe Attestation:   Scribe #1: I performed the above scribed service and the documentation accurately describes the services I performed. I attest to the accuracy of the note.    Attending Attestation:     Physician Attestation Statement for NP/PA:   I have conducted a face to face encounter with this patient in addition to the NP/PA, due to NP/PA Request    Other NP/PA Attestation Additions:      Medical Decision Making: I agree with the treatment plan and evaluation as outlined by the nurse practitioner.  This is a he 44-year-old male who has presented to the emergency department before for intractable vomiting and abdominal pain.  Today, he has a mildly elevated lipase in addition to acute renal insufficiency.  His creatinine on repeat was 1.9 which had improved after fluids.  He was tolerating oral fluids initially but then started feeling nauseated again and started vomiting. Given that he is unable tolerate oral fluids at this time, will need to admit this patient again.  Patient will be admitted to the observation service for continued IV fluids and antiemetics.                                 Clinical Impression:       ICD-10-CM ICD-9-CM   1. Acute kidney injury N17.9 584.9   2. Left testicular pain N50.812 608.9   3. Drug abuse F19.10 305.90   4. Dehydration  E86.0 276.51   5. Hypokalemia E87.6 276.8   6. Intractable vomiting with nausea, unspecified vomiting type R11.2 536.2   7. Elevated lipase R74.8 790.5         Disposition:   Disposition: Placed in Observation  Condition: Stable       TRUDY WALSH, personally performed the services described in this documentation. All medical record entries made by the scribe were at my direction and in my presence.  I have reviewed the chart and agree that the record reflects my personal performance and is accurate and complete                 Haydee Aquino NP  11/21/19 0366       Haydee Aquino NP  11/21/19 3628

## 2019-11-21 NOTE — ED TRIAGE NOTES
Pt states got off his BP medicines and then you started taking them.  He said that he has not been able to keep anything down and feels like he is dehydrated.  Pt denies loose stools. Pt last oral intake was Wednesday.

## 2019-11-22 VITALS
SYSTOLIC BLOOD PRESSURE: 150 MMHG | DIASTOLIC BLOOD PRESSURE: 85 MMHG | BODY MASS INDEX: 21.36 KG/M2 | HEART RATE: 70 BPM | OXYGEN SATURATION: 97 % | TEMPERATURE: 100 F | HEIGHT: 74 IN | RESPIRATION RATE: 18 BRPM | WEIGHT: 166.44 LBS

## 2019-11-22 LAB
ALBUMIN SERPL BCP-MCNC: 3.9 G/DL (ref 3.5–5.2)
ALP SERPL-CCNC: 95 U/L (ref 55–135)
ALT SERPL W/O P-5'-P-CCNC: 22 U/L (ref 10–44)
ANION GAP SERPL CALC-SCNC: 9 MMOL/L (ref 8–16)
AST SERPL-CCNC: 23 U/L (ref 10–40)
BASOPHILS # BLD AUTO: 0.03 K/UL (ref 0–0.2)
BASOPHILS NFR BLD: 0.4 % (ref 0–1.9)
BILIRUB SERPL-MCNC: 0.7 MG/DL (ref 0.1–1)
BUN SERPL-MCNC: 21 MG/DL (ref 6–20)
CALCIUM SERPL-MCNC: 9.3 MG/DL (ref 8.7–10.5)
CHLORIDE SERPL-SCNC: 106 MMOL/L (ref 95–110)
CO2 SERPL-SCNC: 25 MMOL/L (ref 23–29)
CREAT SERPL-MCNC: 1.4 MG/DL (ref 0.5–1.4)
DIFFERENTIAL METHOD: NORMAL
EOSINOPHIL # BLD AUTO: 0 K/UL (ref 0–0.5)
EOSINOPHIL NFR BLD: 0.3 % (ref 0–8)
ERYTHROCYTE [DISTWIDTH] IN BLOOD BY AUTOMATED COUNT: 14.2 % (ref 11.5–14.5)
EST. GFR  (AFRICAN AMERICAN): >60 ML/MIN/1.73 M^2
EST. GFR  (NON AFRICAN AMERICAN): >60 ML/MIN/1.73 M^2
GLUCOSE SERPL-MCNC: 121 MG/DL (ref 70–110)
HCT VFR BLD AUTO: 45 % (ref 40–54)
HGB BLD-MCNC: 15 G/DL (ref 14–18)
IMM GRANULOCYTES # BLD AUTO: 0.01 K/UL (ref 0–0.04)
IMM GRANULOCYTES NFR BLD AUTO: 0.1 % (ref 0–0.5)
LYMPHOCYTES # BLD AUTO: 1.9 K/UL (ref 1–4.8)
LYMPHOCYTES NFR BLD: 26.2 % (ref 18–48)
MCH RBC QN AUTO: 30.6 PG (ref 27–31)
MCHC RBC AUTO-ENTMCNC: 33.3 G/DL (ref 32–36)
MCV RBC AUTO: 92 FL (ref 82–98)
MONOCYTES # BLD AUTO: 0.7 K/UL (ref 0.3–1)
MONOCYTES NFR BLD: 9.8 % (ref 4–15)
NEUTROPHILS # BLD AUTO: 4.6 K/UL (ref 1.8–7.7)
NEUTROPHILS NFR BLD: 63.2 % (ref 38–73)
NRBC BLD-RTO: 0 /100 WBC
PLATELET # BLD AUTO: 251 K/UL (ref 150–350)
PMV BLD AUTO: 9.5 FL (ref 9.2–12.9)
POTASSIUM SERPL-SCNC: 3.9 MMOL/L (ref 3.5–5.1)
PROT SERPL-MCNC: 7.2 G/DL (ref 6–8.4)
RBC # BLD AUTO: 4.9 M/UL (ref 4.6–6.2)
SODIUM SERPL-SCNC: 140 MMOL/L (ref 136–145)
WBC # BLD AUTO: 7.22 K/UL (ref 3.9–12.7)

## 2019-11-22 PROCEDURE — 36415 COLL VENOUS BLD VENIPUNCTURE: CPT

## 2019-11-22 PROCEDURE — 96361 HYDRATE IV INFUSION ADD-ON: CPT

## 2019-11-22 PROCEDURE — 63600175 PHARM REV CODE 636 W HCPCS: Performed by: NURSE PRACTITIONER

## 2019-11-22 PROCEDURE — 63600175 PHARM REV CODE 636 W HCPCS: Performed by: HOSPITALIST

## 2019-11-22 PROCEDURE — G0378 HOSPITAL OBSERVATION PER HR: HCPCS

## 2019-11-22 PROCEDURE — 80053 COMPREHEN METABOLIC PANEL: CPT

## 2019-11-22 PROCEDURE — 85025 COMPLETE CBC W/AUTO DIFF WBC: CPT

## 2019-11-22 PROCEDURE — 25000003 PHARM REV CODE 250: Performed by: HOSPITALIST

## 2019-11-22 PROCEDURE — 96376 TX/PRO/DX INJ SAME DRUG ADON: CPT

## 2019-11-22 PROCEDURE — 25000003 PHARM REV CODE 250: Performed by: NURSE PRACTITIONER

## 2019-11-22 PROCEDURE — 96375 TX/PRO/DX INJ NEW DRUG ADDON: CPT

## 2019-11-22 RX ORDER — SCOLOPAMINE TRANSDERMAL SYSTEM 1 MG/1
1 PATCH, EXTENDED RELEASE TRANSDERMAL
Status: DISCONTINUED | OUTPATIENT
Start: 2019-11-22 | End: 2019-11-22 | Stop reason: HOSPADM

## 2019-11-22 RX ORDER — LORAZEPAM 2 MG/ML
0.5 INJECTION INTRAMUSCULAR ONCE
Status: COMPLETED | OUTPATIENT
Start: 2019-11-22 | End: 2019-11-22

## 2019-11-22 RX ORDER — PROMETHAZINE HYDROCHLORIDE 25 MG/1
25 SUPPOSITORY RECTAL EVERY 6 HOURS PRN
Qty: 5 SUPPOSITORY | Refills: 0 | Status: ON HOLD | OUTPATIENT
Start: 2019-11-22 | End: 2023-10-11 | Stop reason: HOSPADM

## 2019-11-22 RX ORDER — HYDRALAZINE HYDROCHLORIDE 20 MG/ML
10 INJECTION INTRAMUSCULAR; INTRAVENOUS EVERY 8 HOURS PRN
Status: DISCONTINUED | OUTPATIENT
Start: 2019-11-22 | End: 2019-11-22 | Stop reason: HOSPADM

## 2019-11-22 RX ADMIN — PANTOPRAZOLE SODIUM 40 MG: 40 TABLET, DELAYED RELEASE ORAL at 08:11

## 2019-11-22 RX ADMIN — AMLODIPINE BESYLATE 10 MG: 5 TABLET ORAL at 08:11

## 2019-11-22 RX ADMIN — DICYCLOMINE HYDROCHLORIDE 10 MG: 10 CAPSULE ORAL at 08:11

## 2019-11-22 RX ADMIN — LORAZEPAM 0.5 MG: 2 INJECTION, SOLUTION INTRAMUSCULAR; INTRAVENOUS at 12:11

## 2019-11-22 RX ADMIN — PROCHLORPERAZINE EDISYLATE 10 MG: 5 INJECTION INTRAMUSCULAR; INTRAVENOUS at 06:11

## 2019-11-22 RX ADMIN — SCOPOLAMINE 1 PATCH: 1 PATCH TRANSDERMAL at 12:11

## 2019-11-22 RX ADMIN — SODIUM CHLORIDE: 0.9 INJECTION, SOLUTION INTRAVENOUS at 06:11

## 2019-11-22 NOTE — HPI
44 y.o. male with hypertension, tobacco use, synthetic marijuana use, and substance induced mood disorder presents with complaint of vomiting for the past 3 days.  Associated with generalized lower abdominal pain and nausea.  Similar presentations in the past attributed to hyperemesis cyclic vomiting secondary to synthetic marijuana use.  He denies fever, chills, cough, SOB, chest pain, palpitations, dizziness, syncope, hematemesis, coffee-ground emesis, diarrhea, bloody or black stool, or dysuria.  In the ED, labs reveal evidence of acute kidney injury with elevated creatinine more than double his baseline.  He was also hypertensive.  He received IV fluids and antiemetics with initial improvement and resolution of the symptoms; however, upon being told he would be discharged he became very tearful and reported nausea and began dry heaving.

## 2019-11-22 NOTE — PROGRESS NOTES
Dehydration (Adult)  Dehydration occurs when your body loses too much fluid. This may be the result of prolonged vomiting or diarrhea, excessive sweating, or a high fever. It may also happen if you dont drink enough fluid when youre sick or out in the heat. Misuse of diuretics (water pills) can also be a cause.  Symptoms include thirst and decreased urine output. You may also feel dizzy, weak, fatigued, or very drowsy. The diet described below is usually enough to treat dehydration. In some cases, you may need medicine.  Home care  · Drink at least 12 8-ounce glasses of fluid every day to resolve the dehydration. Fluid may include water; orange juice; lemonade; apple, grape, or cranberry juice; clear fruit drinks; electrolyte replacement and sports drinks; and teas and coffee without caffeine. If you have been diagnosed with a kidney disease, ask your doctor how much and what types of fluids you should drink to prevent dehydration. If you have kidney disease, fluid can build up in the body. This can be dangerous to your health.  · If you have a fever, muscle aches, or a headache as a result of a cold or flu, you may take acetaminophen or ibuprofen, unless another medicine was prescribed. If you have chronic liver or kidney disease, or have ever had a stomach ulcer or gastrointestinal bleeding, talk with your health care provider before using these medicines. Don't take aspirin if you are younger than 18 and have a fever. Aspirin raises the chance for severe liver injury.  Follow-up care  Follow up with your health care provider, or as advised.  When to seek medical advice  Call your health care provider right away if any of these occur:  · Continued vomiting  · Frequent diarrhea (more than 5 times a day); blood (red or black color) or mucus in diarrhea  · Blood in vomit or stool  · Swollen abdomen or increasing abdominal pain  · Weakness, dizziness, or fainting  · Unusual drowsiness or confusion  · Reduced urine  output or extreme thirst  · Fever of 100.4°F (34°C) or higher

## 2019-11-22 NOTE — NURSING
Pt had no c/o pain, but nausea nad refused Zofran when offered. Pt informed that he has been started on clear liquid diet to see how he tolerates it

## 2019-11-22 NOTE — SUBJECTIVE & OBJECTIVE
Past Medical History:   Diagnosis Date    Addiction to drug     Gastritis     GERD (gastroesophageal reflux disease)     Hallucination     Hypertension     Renal disorder     Sleep difficulties     Substance abuse        Past Surgical History:   Procedure Laterality Date    NO PAST SURGERIES  11/21/2019       Review of patient's allergies indicates:  No Known Allergies    No current facility-administered medications on file prior to encounter.      Current Outpatient Medications on File Prior to Encounter   Medication Sig    amLODIPine (NORVASC) 10 MG tablet Take 1 tablet (10 mg total) by mouth once daily.    [DISCONTINUED] ondansetron (ZOFRAN-ODT) 4 MG TbDL Take 1 tablet (4 mg total) by mouth every 6 (six) hours as needed.    [DISCONTINUED] promethazine (PHENERGAN) 25 MG suppository Place 1 suppository (25 mg total) rectally every 6 (six) hours as needed for Nausea.     Family History     Problem Relation (Age of Onset)    Arthritis Mother    Crohn's disease Sister    Hearing loss Father        Tobacco Use    Smoking status: Current Some Day Smoker     Packs/day: 0.25     Years: 3.00     Pack years: 0.75    Smokeless tobacco: Never Used   Substance and Sexual Activity    Alcohol use: Not Currently    Drug use: Yes     Comment: mojo daily    Sexual activity: Yes     Partners: Female     Birth control/protection: None     Review of Systems   Constitutional: Negative for chills and fever.   Eyes: Negative for photophobia and visual disturbance.   Respiratory: Negative for cough and shortness of breath.    Cardiovascular: Negative for chest pain, palpitations and leg swelling.   Gastrointestinal: Positive for abdominal pain, nausea and vomiting. Negative for diarrhea.   Genitourinary: Negative for frequency, hematuria and urgency.   Skin: Negative for pallor, rash and wound.   Neurological: Negative for light-headedness and headaches.   Psychiatric/Behavioral: Negative for confusion and decreased  concentration.     Objective:     Vital Signs (Most Recent):  Temp: 99.2 °F (37.3 °C) (11/21/19 1847)  Pulse: 61 (11/21/19 1847)  Resp: 17 (11/21/19 1847)  BP: (!) 195/103 (11/21/19 1847)  SpO2: 98 % (11/21/19 1847) Vital Signs (24h Range):  Temp:  [98.2 °F (36.8 °C)-99.2 °F (37.3 °C)] 99.2 °F (37.3 °C)  Pulse:  [61-80] 61  Resp:  [16-18] 17  SpO2:  [97 %-99 %] 98 %  BP: (131-195)/() 195/103     Weight: 83.9 kg (185 lb)  Body mass index is 23.75 kg/m².    Physical Exam   Constitutional: He is oriented to person, place, and time. He appears well-developed and well-nourished. No distress.   HENT:   Head: Normocephalic and atraumatic.   Right Ear: External ear normal.   Left Ear: External ear normal.   Nose: Nose normal.   Mouth/Throat: Oropharynx is clear and moist.   Eyes: Pupils are equal, round, and reactive to light. Conjunctivae and EOM are normal.   Neck: Normal range of motion. Neck supple.   Cardiovascular: Normal rate, regular rhythm and intact distal pulses.   Pulmonary/Chest: Effort normal and breath sounds normal. No respiratory distress. He has no wheezes. He has no rales.   Abdominal: Soft. Bowel sounds are normal. He exhibits no distension. There is no tenderness.   No palpable hepatomegaly or splenomegaly   Musculoskeletal: Normal range of motion. He exhibits no edema or tenderness.   Neurological: He is alert and oriented to person, place, and time.   Skin: Skin is warm and dry.   Psychiatric: Thought content normal. His mood appears anxious. His affect is inappropriate. He is agitated. He expresses impulsivity.   Nursing note and vitals reviewed.        CRANIAL NERVES     CN III, IV, VI   Pupils are equal, round, and reactive to light.  Extraocular motions are normal.        Significant Labs: All pertinent labs within the past 24 hours have been reviewed.    Significant Imaging: I have reviewed all pertinent imaging results/findings within the past 24 hours.

## 2019-11-22 NOTE — NURSING
Patient wants to take shower again, IV paused. Advised against d/t recent meds given, increased fall risk. Patient still wants shower, says he will be careful. Bed changed and trash removed from floor of room.

## 2019-11-22 NOTE — NURSING
Pt stated no vomiting ate a little of his diet at bedside and was disconnected from IV fluids to shower

## 2019-11-22 NOTE — NURSING
Patient in shower for 1/2 hr after arrival to unit, refused to be assessed before showering. No c/o pain or nausea at that time.   Patient now c/o nausea and emesis x 1, zofran IV given. Family in room. Patient sobbing and leaning on family member. Patient's clothes strewn about room, underwear in sink. Patient's family member disconnected IV so patient can use restroom, did not call nurse. I asked family and patient to not do this anymore could cause infection and safety issue.

## 2019-11-22 NOTE — PLAN OF CARE
Problem: Adult Inpatient Plan of Care  Goal: Plan of Care Review  Outcome: Ongoing, Progressing      Problem: Fall Injury Risk  Goal: Absence of Fall and Fall-Related Injury  Outcome: Ongoing, Progressing    Pt remained free of falls during current shift. Plan of care and fall precautions reviewed with patient. Verbalized understanding. Patient N/V relieved with IV antiemetics. Patient excessively showers to help with his nausea from Mojo use. Bed locked and in lowest postion. SR up x 2, call light in reach.

## 2019-11-22 NOTE — PLAN OF CARE
11/22/19 1424   Final Note   Assessment Type Final Discharge Note   Anticipated Discharge Disposition Home   Hospital Follow Up  Appt(s) scheduled?   (pt given referral to [facility])   Discharge plans and expectations educations in teach back method with documentation complete? Yes   Right Care Referral Info   Post Acute Recommendation No Care   pts nurse Krystina notified that pt can d/c from CM standpoint.

## 2019-11-22 NOTE — NURSING
Pt reminded that food is at bedside and encouraged to eat so the NP can see how he tolerates his diet. Pt stated he will get up.

## 2019-11-22 NOTE — ASSESSMENT & PLAN NOTE
Improving, likely secondary to gastric losses and poor p.o. intake, continue IV fluids and antiemetics, strict I/O's, monitor renal function and electrolytes, avoid nephrotoxic agents.

## 2019-11-22 NOTE — HOSPITAL COURSE
The patient responded well to bowel rest and treatment with antiemetics and IV fluids.  His acute kidney injury was responsive to IV fluids and kidney functions normalized.  No new episodes of nausea or vomiting after scopolamine patch placed.  Abdominal x-ray showed nonobstructive bowel gas pattern.  An x-ray of testicle showed normal bilateral testicles and epididymis.  Patient was in an out of the shower but otherwise in stable condition.  His temperature normalized when he was either shower he was taking extremely hot showers and therefore his temperature was sometimes high normal.  He was without leukocytosis, his electrolytes within normal limits, his a padded functions normal.  He was encouraged to stop marijuana consumption.  At 1 point the patient was crying and lying in the floor saying that he had pain Ativan 0.5 mg was administered in early morning which appeared to resolve all of his issues he was seen ambulating about the floor going to the coffee pot and drinking coffee.  His vital signs are stable

## 2019-11-22 NOTE — ASSESSMENT & PLAN NOTE
5 minutes spent counseling the patient on smoking cessation and he is not currently ready to stop smoking. He will be offereded a nicotine transdermal patch while hospitalized and monitored for withdrawal.  Will provide additional smoking cessation counseling prior to discharge.

## 2019-11-22 NOTE — ASSESSMENT & PLAN NOTE
Has failed 2 PO challenges despite temporary improvement with antiemetics, continue supportive care, advance diet as tolerated

## 2019-11-22 NOTE — DISCHARGE SUMMARY
[facility] Hospital Medicine  Discharge Summary      Patient Name: Jayson Berkowitz  MRN: [id number]  Admission Date: 11/21/2019  Hospital Length of Stay: 0 days  Discharge Date and Time:  11/22/2019 4:12 PM  Attending Physician: Desiree att. providers found   Discharging Provider: PHU Ann  Primary Care Provider: Primary Doctor Desiree      HPI:   44 y.o. male with hypertension, tobacco use, synthetic marijuana use, and substance induced mood disorder presents with complaint of vomiting for the past 3 days.  Associated with generalized lower abdominal pain and nausea.  Similar presentations in the past attributed to hyperemesis cyclic vomiting secondary to synthetic marijuana use.  He denies fever, chills, cough, SOB, chest pain, palpitations, dizziness, syncope, hematemesis, coffee-ground emesis, diarrhea, bloody or black stool, or dysuria.  In the ED, labs reveal evidence of acute kidney injury with elevated creatinine more than double his baseline.  He was also hypertensive.  He received IV fluids and antiemetics with initial improvement and resolution of the symptoms; however, upon being told he would be discharged he became very tearful and reported nausea and began dry heaving.    * No surgery found *      Hospital Course:   The patient responded well to bowel rest and treatment with antiemetics and IV fluids.  His acute kidney injury was responsive to IV fluids and kidney functions normalized.  No new episodes of nausea or vomiting after scopolamine patch placed.  Abdominal x-ray showed nonobstructive bowel gas pattern.  An x-ray of testicle showed normal bilateral testicles and epididymis.  Patient was in an out of the shower but otherwise in stable condition.  His temperature normalized when he was either shower he was taking extremely hot showers and therefore his temperature was sometimes high normal.  He was without leukocytosis, his electrolytes within normal limits, his a padded  functions normal.  He was encouraged to stop marijuana consumption.  At 1 point the patient was crying and lying in the floor saying that he had pain Ativan 0.5 mg was administered in early morning which appeared to resolve all of his issues he was seen ambulating about the floor going to the coffee pot and drinking coffee.  His vital signs are stable     Consults:     No new Assessment & Plan notes have been filed under this hospital service since the last note was generated.  Service: Hospital Medicine    Final Active Diagnoses:    Diagnosis Date Noted POA    PRINCIPAL PROBLEM:  Intractable vomiting with nausea [R11.2] 01/31/2016 Yes    Essential hypertension [I10] 10/13/2019 Yes     Chronic    Acute kidney injury [N17.9] 11/21/2019 Yes    Substance induced mood disorder [F19.94] 10/14/2019 Yes    Marijuana use [F12.90] 08/23/2019 Yes    Tobacco abuse [Z72.0] 08/23/2019 Yes      Problems Resolved During this Admission:       Discharged Condition: stable    Disposition: Home or Self Care    Follow Up:  Follow-up Information     [facility].    Why:  please call at time of discharge to schedule post hospital discharge follow up appointment  Contact information:  [address]  [place] [address]  [phone]                 Patient Instructions:   No discharge procedures on file.    Significant Diagnostic Studies: Labs:   CMP   Recent Labs   Lab 11/21/19  1447 11/22/19  0342    140   K 3.4* 3.9   CL 96 106   CO2 30* 25   * 121*   BUN 35* 21*   CREATININE 2.5* 1.4   CALCIUM 9.9 9.3   PROT 9.0* 7.2   ALBUMIN 5.0 3.9   BILITOT 0.8 0.7   ALKPHOS 120 95   AST 24 23   ALT 26 22   ANIONGAP 11 9   ESTGFRAFRICA 35* >60   EGFRNONAA 31* >60   , CBC   Recent Labs   Lab 11/21/19  1347  11/22/19  0342   WBC 9.38  --  7.22   HGB 17.4  --  15.0   HCT 51.3   < > 45.0     --  251    < > = values in this interval not displayed.       Pending Diagnostic Studies:     None          Medications:  Reconciled Home Medications:      Medication List      START taking these medications    promethazine 25 MG suppository  Commonly known as:  PHENERGAN  Place 1 suppository (25 mg total) rectally every 6 (six) hours as needed for Nausea.        CONTINUE taking these medications    amLODIPine 10 MG tablet  Commonly known as:  NORVASC  Take 1 tablet (10 mg total) by mouth once daily.            Indwelling Lines/Drains at time of discharge:   Lines/Drains/Airways     None                 Time spent on the discharge of patient: 35 minutes  Patient was seen and examined on the date of discharge and determined to be suitable for discharge.    JORGE Petersen, FNP-C  Hospitalist - Department of Hospital Medicine  [address], [place] [address]  Office [phone]; Pager [phone]

## 2019-11-22 NOTE — PROGRESS NOTES
WRITTEN HEALTHCARE DISCHARGE INFORMATION      Things that YOU are RESPONSIBLE for to Manage Your Care At Home:     1. Getting your prescriptions filled.  2. Taking you medications as directed. DO NOT MISS ANY DOSES!  3. Going to your follow-up doctor appointments. This is important because it allows the doctor to monitor your progress and to determine if any changes need to be made to your treatment plan.     If you are unable to make your follow up appointments, please call the number listed and reschedule this appointment.      ____________HELP AT HOME____________________     Experiencing any SIGNS or SYMPTOMS: YOU CAN     Schedule a same day appopintment with your Primary Care Doctor or  you can call [facility] for 24/7 assistance at [phone]     If you are experience any signs or symptoms that have become severe, Call 911 and come to your nearest Emergency Room.     Thank you for choosing [facility] and allowing us to care for you.   From your care management team:      You should receive a call from [facility] within 48-72 hours to help manage your care after discharge. Please try to make sure that you answer your phone for this important phone call.    Follow-up Information     [facility].    Why:  please call at time of discharge to schedule post hospital discharge follow up appointment  Contact information:  [address] [place] [address]  [phone]

## 2019-11-22 NOTE — NURSING
Received report from YAMILKA Varma RN. Pt resting comfortably at bedside w/ nad noted. IV infusing and to site is clear. Pt  safety maintained with bed low side rails up x2 with nurse call bell within reach

## 2019-11-22 NOTE — NURSING
"Was called into pt room pt observed leaning on bed kneeling on floor crying stating that his stomach just hurts. Pt stated "Help me please". Pt was put back in bed and informed that NP will be notified.  "

## 2019-11-22 NOTE — ASSESSMENT & PLAN NOTE
Thorough psychiatric evaluation last admission less than 1 month ago, at that time regular outpatient psychiatric care was recommended, continue outpatient follow-up.

## 2019-11-22 NOTE — PLAN OF CARE
11/22/19 1000   Discharge Assessment   Assessment Type Discharge Planning Assessment   Assessment information obtained from? Medical Record   Prior to hospitilization cognitive status: Alert/Oriented   Prior to hospitalization functional status: Independent   Current cognitive status: Alert/Oriented   Current Functional Status: Independent   Facility Arrived From: home   Lives With other (see comments)   Able to Return to Prior Arrangements yes   Is patient able to care for self after discharge? Yes   Who are your caregiver(s) and their phone number(s)? [employer]-[phone]   Patient's perception of discharge disposition home or selfcare   Readmission Within the Last 30 Days no previous admission in last 30 days   Patient currently being followed by outpatient case management? No   Patient currently receives any other outside agency services? No   Equipment Currently Used at Home none   Do you have any problems affording any of your prescribed medications? No   Is the patient taking medications as prescribed? yes   Does the patient have transportation home? Yes   Transportation Anticipated family or friend will provide   Does the patient receive services at the [facility]mad[facility] Clinic? No   Discharge Plan A Home with family  (with referral to [facility])   DME Needed Upon Discharge  none   Patient/Family in Agreement with Plan yes     [id number] [place] - [address]  [address] [place] [address]  Phone: [phone] Fax: [phone]

## 2019-11-22 NOTE — NURSING
Pt offered Zofran for c/o nausea, but refused stating that he just wanted to sleep. Pt immediately got up OOB after received medications to take a shower. Pt was disconnected from IV fluids and IV protect to take shower. Pt informed to call when he is done.

## 2019-11-22 NOTE — H&P
[facility] Hospital Medicine  History & Physical    Patient Name: Jayson Berkowitz  MRN: [id number]  Admission Date: 11/21/2019  Attending Physician: Mamie Lockwood MD   Primary Care Provider: Primary Doctor No         Patient information was obtained from patient, past medical records and ER records.     Subjective:     Principal Problem:Intractable vomiting with nausea    Chief Complaint:   Chief Complaint   Patient presents with    Vomiting     Pt with multiple episodes of vomiting x 3 days. Denies diarrhea. Reports lower abdominal pain. Denies dysuria and penile discharge         HPI: 44 y.o. male with hypertension, tobacco use, synthetic marijuana use, and substance induced mood disorder presents with complaint of vomiting for the past 3 days.  Associated with generalized lower abdominal pain and nausea.  Similar presentations in the past attributed to hyperemesis cyclic vomiting secondary to synthetic marijuana use.  He denies fever, chills, cough, SOB, chest pain, palpitations, dizziness, syncope, hematemesis, coffee-ground emesis, diarrhea, bloody or black stool, or dysuria.  In the ED, labs reveal evidence of acute kidney injury with elevated creatinine more than double his baseline.  He was also hypertensive.  He received IV fluids and antiemetics with initial improvement and resolution of the symptoms; however, upon being told he would be discharged he became very tearful and reported nausea and began dry heaving.    Past Medical History:   Diagnosis Date    Addiction to drug     Gastritis     GERD (gastroesophageal reflux disease)     Hallucination     Hypertension     Renal disorder     Sleep difficulties     Substance abuse        Past Surgical History:   Procedure Laterality Date    NO PAST SURGERIES  11/21/2019       Review of patient's allergies indicates:  No Known Allergies    No current facility-administered medications on file prior to encounter.      Current  Outpatient Medications on File Prior to Encounter   Medication Sig    amLODIPine (NORVASC) 10 MG tablet Take 1 tablet (10 mg total) by mouth once daily.    [DISCONTINUED] ondansetron (ZOFRAN-ODT) 4 MG TbDL Take 1 tablet (4 mg total) by mouth every 6 (six) hours as needed.    [DISCONTINUED] promethazine (PHENERGAN) 25 MG suppository Place 1 suppository (25 mg total) rectally every 6 (six) hours as needed for Nausea.     Family History     Problem Relation (Age of Onset)    Arthritis Mother    Crohn's disease Sister    Hearing loss Father        Tobacco Use    Smoking status: Current Some Day Smoker     Packs/day: 0.25     Years: 3.00     Pack years: 0.75    Smokeless tobacco: Never Used   Substance and Sexual Activity    Alcohol use: Not Currently    Drug use: Yes     Comment: mojo daily    Sexual activity: Yes     Partners: Female     Birth control/protection: None     Review of Systems   Constitutional: Negative for chills and fever.   Eyes: Negative for photophobia and visual disturbance.   Respiratory: Negative for cough and shortness of breath.    Cardiovascular: Negative for chest pain, palpitations and leg swelling.   Gastrointestinal: Positive for abdominal pain, nausea and vomiting. Negative for diarrhea.   Genitourinary: Negative for frequency, hematuria and urgency.   Skin: Negative for pallor, rash and wound.   Neurological: Negative for light-headedness and headaches.   Psychiatric/Behavioral: Negative for confusion and decreased concentration.     Objective:     Vital Signs (Most Recent):  Temp: 99.2 °F (37.3 °C) (11/21/19 1847)  Pulse: 61 (11/21/19 1847)  Resp: 17 (11/21/19 1847)  BP: (!) 195/103 (11/21/19 1847)  SpO2: 98 % (11/21/19 1847) Vital Signs (24h Range):  Temp:  [98.2 °F (36.8 °C)-99.2 °F (37.3 °C)] 99.2 °F (37.3 °C)  Pulse:  [61-80] 61  Resp:  [16-18] 17  SpO2:  [97 %-99 %] 98 %  BP: (131-195)/() 195/103     Weight: 83.9 kg (185 lb)  Body mass index is 23.75 kg/m².    Physical  Exam   Constitutional: He is oriented to person, place, and time. He appears well-developed and well-nourished. No distress.   HENT:   Head: Normocephalic and atraumatic.   Right Ear: External ear normal.   Left Ear: External ear normal.   Nose: Nose normal.   Mouth/Throat: Oropharynx is clear and moist.   Eyes: Pupils are equal, round, and reactive to light. Conjunctivae and EOM are normal.   Neck: Normal range of motion. Neck supple.   Cardiovascular: Normal rate, regular rhythm and intact distal pulses.   Pulmonary/Chest: Effort normal and breath sounds normal. No respiratory distress. He has no wheezes. He has no rales.   Abdominal: Soft. Bowel sounds are normal. He exhibits no distension. There is no tenderness.   No palpable hepatomegaly or splenomegaly   Musculoskeletal: Normal range of motion. He exhibits no edema or tenderness.   Neurological: He is alert and oriented to person, place, and time.   Skin: Skin is warm and dry.   Psychiatric: Thought content normal. His mood appears anxious. His affect is inappropriate. He is agitated. He expresses impulsivity.   Nursing note and vitals reviewed.        CRANIAL NERVES     CN III, IV, VI   Pupils are equal, round, and reactive to light.  Extraocular motions are normal.        Significant Labs: All pertinent labs within the past 24 hours have been reviewed.    Significant Imaging: I have reviewed all pertinent imaging results/findings within the past 24 hours.    Assessment/Plan:     * Intractable vomiting with nausea  Has failed 2 PO challenges despite temporary improvement with antiemetics, continue supportive care, advance diet as tolerated    Acute kidney injury  Improving, likely secondary to gastric losses and poor p.o. intake, continue IV fluids and antiemetics, strict I/O's, monitor renal function and electrolytes, avoid nephrotoxic agents.     Substance induced mood disorder  Thorough psychiatric evaluation last admission less than 1 month ago, at that  time regular outpatient psychiatric care was recommended, continue outpatient follow-up.    Essential hypertension  Poorly controlled, continue home medications and monitor blood pressure, adjust as needed.     Tobacco abuse  5 minutes spent counseling the patient on smoking cessation and he is not currently ready to stop smoking. He will be offereded a nicotine transdermal patch while hospitalized and monitored for withdrawal.  Will provide additional smoking cessation counseling prior to discharge.     Marijuana use  Encourage cessation      VTE Risk Mitigation (From admission, onward)         Ordered     IP VTE LOW RISK PATIENT  Once      11/21/19 1818     Place KRISTAL hose  Until discontinued      11/21/19 1818              Jameel Gonzalez Jr., APRN, AGACN-BC  Hospitalist - Department of Hospital Medicine  [facility] [address] [place] [id number]  Office #: [phone]; Pager #: [phone]

## 2019-11-22 NOTE — NURSING
Pt IV D/C w/ nad noted. Pt received D/C instructions and verbalized understanding. Pt left via ambulation w/ nad noted accompanied by family

## 2019-12-24 ENCOUNTER — HOSPITAL ENCOUNTER (EMERGENCY)
Facility: HOSPITAL | Age: 44
Discharge: HOME | End: 2019-12-24
Attending: EMERGENCY MEDICINE
Payer: MEDICAID

## 2019-12-24 VITALS
HEIGHT: 74 IN | BODY MASS INDEX: 25.03 KG/M2 | SYSTOLIC BLOOD PRESSURE: 160 MMHG | DIASTOLIC BLOOD PRESSURE: 89 MMHG | WEIGHT: 195 LBS | OXYGEN SATURATION: 99 % | TEMPERATURE: 99 F | RESPIRATION RATE: 18 BRPM | HEART RATE: 74 BPM

## 2019-12-24 DIAGNOSIS — M25.572 LEFT ANKLE PAIN: Primary | ICD-10-CM

## 2019-12-24 PROCEDURE — 99283 EMERGENCY DEPT VISIT LOW MDM: CPT | Mod: 25

## 2019-12-25 NOTE — ED PROVIDER NOTES
Diet For Vomiting or Diarrhea (Age 6 to Adult]    If your symptoms return or get worse after eating certain foods listed below, you should stop eating them until your symptoms discontinue and you feel better. Once the vomiting stops, then. Terra Plan Terra Plan Â   During the first 12 to 24 hours  During the first 12 to 24 hours, follow the diet below:  Â· Beverages. Â Plain water, sport drinks like Gatorade, soft drinks without caffeine; mineral water (plain or flavored); clear fruit juices, decaffeinated tea and coffee. Â· Soups. Â Clear broth, consommÃ©, and bouillon  Â· Desserts. Plain gelatin, popsicles and fruit juice bars. As you feel better, you may add 6 oz to 8 oz of yogurt per day. If you are suffering from diarrhea, avoid foods and beverages that contain sugar, high-fructose corn syrup, and sugar alcohols. During the next 24 hours  During the next 24 hours you may add the following to the above:  Â· Hot cereal, plain toast, bread, rolls, crackers  Â· Plain noodles, rice, mashed potatoes, chicken noodle or rice soup  Â· Unsweetened canned fruit (avoid pineapple), bananas  Limit fat intake to less than 15 grams per day by avoiding margarine, butter, oils, mayonnaise, sauces, gravies, fried foods, peanut butter, meat, poultry, and fish. Limit fiber; avoid raw or cooked vegetables, fresh fruits (except bananas), and bran cereals. Limit caffeine and chocolate. No spices or seasonings except salt. During the next 24 hours  Gradually resume a normal diet, as you feel better and your symptoms lessen. Â© [address] [facility] The [address] [place] [address] [address], [place], [address] All rights reserved. This information is not intended as a substitute for professional medical care. Always follow your healthcare professional's instructions. AVOID DAIRY TILL BETTER. Encounter Date: 12/24/2019       History     Chief Complaint   Patient presents with    Ankle Pain     Patient reports twisiting his ankle at work and falling. Patient states incidents took place at 1500 but pain increasing. Rates pain 5/10 describes sensation as throbbing and heaviness. denies taking meds for relief.      42-year-old male with history of drug addiction, gastritis, GERD, hypertension, history of renal disorder, sleep disturbances, substance abuse, with chief complaint atraumatic left ankle pain times today.  Patient states he twisted his ankle while at work.  He admits to severe pain to the lateral aspect of left ankle.  Denies previous injury or surgery.  No open wound.  Pain with ambulation and weight-bearing, pain with palpation. Dull, aching pain. Symptoms are acute, constant, severity 5/10.        Review of patient's allergies indicates:  No Known Allergies  Past Medical History:   Diagnosis Date    Addiction to drug     Gastritis     GERD (gastroesophageal reflux disease)     Hallucination     Hypertension     Renal disorder     Sleep difficulties     Substance abuse      Past Surgical History:   Procedure Laterality Date    LEG SURGERY Right     NO PAST SURGERIES  11/21/2019     Family History   Problem Relation Age of Onset    Arthritis Mother     Hearing loss Father     Crohn's disease Sister      Social History     Tobacco Use    Smoking status: Current Some Day Smoker     Packs/day: 0.25     Years: 3.00     Pack years: 0.75    Smokeless tobacco: Never Used   Substance Use Topics    Alcohol use: Not Currently    Drug use: Yes     Comment: mojo daily     Review of Systems   Constitutional: Negative for chills and fever.   HENT: Negative for congestion, rhinorrhea and sore throat.    Respiratory: Negative for shortness of breath.    Cardiovascular: Negative for chest pain.   Gastrointestinal: Negative for abdominal pain, nausea and vomiting.   Genitourinary: Negative for  dysuria.   Musculoskeletal: Positive for arthralgias and gait problem. Negative for back pain and joint swelling.   Skin: Negative for rash.   Neurological: Negative for dizziness, weakness, light-headedness and headaches.   Hematological: Does not bruise/bleed easily.   All other systems reviewed and are negative.      Physical Exam     Initial Vitals [12/24/19 2209]   BP Pulse Resp Temp SpO2   (!) 136/93 74 19 100.3 °F (37.9 °C) 99 %      MAP       --         Physical Exam    Nursing note and vitals reviewed.  Constitutional: He appears well-developed and well-nourished. He is not diaphoretic. No distress.   Well-appearing and nontoxic.  Resting comfortably on exam table.  Unable to tolerate weight-bearing secondary to discomfort.   HENT:   Head: Normocephalic and atraumatic.   Eyes: Conjunctivae and EOM are normal. Pupils are equal, round, and reactive to light.   Neck: Normal range of motion. Neck supple.   Cardiovascular: Intact distal pulses.   1+ DP bilaterally    No pretibial edema.  No unilateral leg swelling.   Musculoskeletal:   Left ankle with tenderness to lateral malleolus.  There is pain with any attempted passive/active range of motion of the ankle.  No Achilles defect or tenderness. No plantar tenderness.  No bony deformity.  No erythema or warmth.  No open wound.  Cap refill normal to all toes.   Neurological: He is alert and oriented to person, place, and time. He has normal strength.   Skin: Skin is warm and dry. Capillary refill takes less than 2 seconds. No rash and no abscess noted. No erythema.   Psychiatric: He has a normal mood and affect. His behavior is normal. Judgment and thought content normal.         ED Course   Procedures  Labs Reviewed - No data to display       Imaging Results          X-Ray Ankle Complete Left (Final result)  Result time 12/24/19 22:56:33    Final result by Debra Sorenson MD (12/24/19 22:56:33)                 Impression:      No acute bony abnormality  detected.      Electronically signed by: Debra Delta  Date:    12/24/2019  Time:    22:56             Narrative:    EXAMINATION:  THREE VIEWS OF THE LEFT ANKLE    CLINICAL HISTORY:  Pain in left ankle and joints of left foot    TECHNIQUE:  AP, lateral and oblique views of the left ankle    COMPARISON:  None.    FINDINGS:  Three views of the left ankle demonstrate no acute fracture or dislocation.  There is a small plantar spur.  There is an os fibulare.  There is an irregular calcification in the interosseous space between the tibia and fibula, which may be related to remote trauma..                                 Medical Decision Making:   Differential Diagnosis:   Fracture, contusion, sprain/strain, arthritis  Clinical Tests:   Radiological Study: Ordered and Reviewed  ED Management:  Repeat temp better; initial may have been erroneous. No c/o fever/chills/myalgias, etc.     Likely ankle sprain. RICE, weight-bearing as tolerated, PCP f/u prn. Return precautions given.                                  Clinical Impression:       ICD-10-CM ICD-9-CM   1. Left ankle pain M25.572 719.47         Disposition:   Disposition: Discharged  Condition: Stable                     Javier Forde PA-C  12/24/19 4402

## 2019-12-25 NOTE — ED TRIAGE NOTES
Pt states that his L-foot was caught in some bags at work and he ended up twisting his leg. Increase pain with movement. Denies taking meds.

## 2019-12-25 NOTE — DISCHARGE INSTRUCTIONS
RICE precautions.  Tylenol or ibuprofen as needed for discomfort.  Progress to weight-bearing over the next 2 days.    Follow-up with your primary care provider for re-evaluation and further recommendations.  Return to this ED if ankle becomes red and warm, if unable to walk or bear weight, if unable to tolerate pain, if any other problems occur.

## 2020-08-19 ENCOUNTER — HOSPITAL ENCOUNTER (EMERGENCY)
Facility: HOSPITAL | Age: 45
Discharge: HOME | End: 2020-08-20
Attending: EMERGENCY MEDICINE

## 2020-08-19 DIAGNOSIS — E87.6 HYPOKALEMIA: ICD-10-CM

## 2020-08-19 DIAGNOSIS — E86.0 DEHYDRATION: Primary | ICD-10-CM

## 2020-08-19 DIAGNOSIS — F12.90 MARIJUANA USE: ICD-10-CM

## 2020-08-19 LAB
ALBUMIN SERPL BCP-MCNC: 5 G/DL (ref 3.5–5.2)
ALP SERPL-CCNC: 120 U/L (ref 55–135)
ALT SERPL W/O P-5'-P-CCNC: 11 U/L (ref 10–44)
ANION GAP SERPL CALC-SCNC: 14 MMOL/L (ref 8–16)
ANION GAP SERPL CALC-SCNC: 18 MMOL/L (ref 8–16)
AST SERPL-CCNC: 17 U/L (ref 10–40)
BACTERIA #/AREA URNS HPF: ABNORMAL /HPF
BASOPHILS # BLD AUTO: 0.02 K/UL (ref 0–0.2)
BASOPHILS NFR BLD: 0.3 % (ref 0–1.9)
BILIRUB SERPL-MCNC: 0.6 MG/DL (ref 0.1–1)
BILIRUB UR QL STRIP: ABNORMAL
BUN SERPL-MCNC: 24 MG/DL (ref 6–30)
BUN SERPL-MCNC: 27 MG/DL (ref 6–20)
CALCIUM SERPL-MCNC: 10.5 MG/DL (ref 8.7–10.5)
CHLORIDE SERPL-SCNC: 101 MMOL/L (ref 95–110)
CHLORIDE SERPL-SCNC: 96 MMOL/L (ref 95–110)
CLARITY UR: ABNORMAL
CO2 SERPL-SCNC: 30 MMOL/L (ref 23–29)
COLOR UR: ABNORMAL
CREAT SERPL-MCNC: 1.5 MG/DL (ref 0.5–1.4)
CREAT SERPL-MCNC: 2.1 MG/DL (ref 0.5–1.4)
DIFFERENTIAL METHOD: ABNORMAL
EOSINOPHIL # BLD AUTO: 0.1 K/UL (ref 0–0.5)
EOSINOPHIL NFR BLD: 0.9 % (ref 0–8)
ERYTHROCYTE [DISTWIDTH] IN BLOOD BY AUTOMATED COUNT: 14.8 % (ref 11.5–14.5)
EST. GFR  (AFRICAN AMERICAN): 44 ML/MIN/1.73 M^2
EST. GFR  (NON AFRICAN AMERICAN): 38 ML/MIN/1.73 M^2
GLUCOSE SERPL-MCNC: 112 MG/DL (ref 70–110)
GLUCOSE SERPL-MCNC: 113 MG/DL (ref 70–110)
GLUCOSE UR QL STRIP: NEGATIVE
HCT VFR BLD AUTO: 49 % (ref 40–54)
HCT VFR BLD CALC: 47 %PCV (ref 36–54)
HGB BLD-MCNC: 16.1 G/DL (ref 14–18)
HGB UR QL STRIP: ABNORMAL
HYALINE CASTS #/AREA URNS LPF: 0 /LPF
IMM GRANULOCYTES # BLD AUTO: 0.02 K/UL (ref 0–0.04)
IMM GRANULOCYTES NFR BLD AUTO: 0.3 % (ref 0–0.5)
KETONES UR QL STRIP: ABNORMAL
LEUKOCYTE ESTERASE UR QL STRIP: NEGATIVE
LIPASE SERPL-CCNC: 148 U/L (ref 4–60)
LYMPHOCYTES # BLD AUTO: 2.8 K/UL (ref 1–4.8)
LYMPHOCYTES NFR BLD: 35.1 % (ref 18–48)
MCH RBC QN AUTO: 29 PG (ref 27–31)
MCHC RBC AUTO-ENTMCNC: 32.9 G/DL (ref 32–36)
MCV RBC AUTO: 88 FL (ref 82–98)
MICROSCOPIC COMMENT: ABNORMAL
MONOCYTES # BLD AUTO: 1 K/UL (ref 0.3–1)
MONOCYTES NFR BLD: 13.1 % (ref 4–15)
NEUTROPHILS # BLD AUTO: 4 K/UL (ref 1.8–7.7)
NEUTROPHILS NFR BLD: 50.3 % (ref 38–73)
NITRITE UR QL STRIP: NEGATIVE
NRBC BLD-RTO: 0 /100 WBC
PH UR STRIP: 5 [PH] (ref 5–8)
PLATELET # BLD AUTO: 327 K/UL (ref 150–350)
PMV BLD AUTO: 9.3 FL (ref 9.2–12.9)
POC IONIZED CALCIUM: 1.21 MMOL/L (ref 1.06–1.42)
POC TCO2 (MEASURED): 26 MMOL/L (ref 23–29)
POTASSIUM BLD-SCNC: 3.5 MMOL/L (ref 3.5–5.1)
POTASSIUM SERPL-SCNC: 2.8 MMOL/L (ref 3.5–5.1)
PROT SERPL-MCNC: 8.8 G/DL (ref 6–8.4)
PROT UR QL STRIP: ABNORMAL
RBC # BLD AUTO: 5.55 M/UL (ref 4.6–6.2)
RBC #/AREA URNS HPF: 3 /HPF (ref 0–4)
SAMPLE: ABNORMAL
SODIUM BLD-SCNC: 140 MMOL/L (ref 136–145)
SODIUM SERPL-SCNC: 140 MMOL/L (ref 136–145)
SP GR UR STRIP: 1.03 (ref 1–1.03)
URN SPEC COLLECT METH UR: ABNORMAL
UROBILINOGEN UR STRIP-ACNC: ABNORMAL EU/DL
WBC # BLD AUTO: 7.89 K/UL (ref 3.9–12.7)
WBC #/AREA URNS HPF: 7 /HPF (ref 0–5)

## 2020-08-19 PROCEDURE — 85025 COMPLETE CBC W/AUTO DIFF WBC: CPT

## 2020-08-19 PROCEDURE — 83690 ASSAY OF LIPASE: CPT

## 2020-08-19 PROCEDURE — 82330 ASSAY OF CALCIUM: CPT

## 2020-08-19 PROCEDURE — 63600175 PHARM REV CODE 636 W HCPCS: Performed by: PHYSICIAN ASSISTANT

## 2020-08-19 PROCEDURE — 81000 URINALYSIS NONAUTO W/SCOPE: CPT

## 2020-08-19 PROCEDURE — 85014 HEMATOCRIT: CPT

## 2020-08-19 PROCEDURE — 80053 COMPREHEN METABOLIC PANEL: CPT

## 2020-08-19 PROCEDURE — 84295 ASSAY OF SERUM SODIUM: CPT

## 2020-08-19 PROCEDURE — 25000003 PHARM REV CODE 250: Performed by: PHYSICIAN ASSISTANT

## 2020-08-19 PROCEDURE — 82565 ASSAY OF CREATININE: CPT

## 2020-08-19 PROCEDURE — 99900035 HC TECH TIME PER 15 MIN (STAT)

## 2020-08-19 PROCEDURE — 93010 EKG 12-LEAD: ICD-10-PCS | Mod: ,,, | Performed by: INTERNAL MEDICINE

## 2020-08-19 PROCEDURE — 84132 ASSAY OF SERUM POTASSIUM: CPT

## 2020-08-19 PROCEDURE — 93010 ELECTROCARDIOGRAM REPORT: CPT | Mod: ,,, | Performed by: INTERNAL MEDICINE

## 2020-08-19 PROCEDURE — 99284 EMERGENCY DEPT VISIT MOD MDM: CPT | Mod: 25

## 2020-08-19 PROCEDURE — 96374 THER/PROPH/DIAG INJ IV PUSH: CPT

## 2020-08-19 PROCEDURE — 93005 ELECTROCARDIOGRAM TRACING: CPT

## 2020-08-19 PROCEDURE — 96361 HYDRATE IV INFUSION ADD-ON: CPT

## 2020-08-19 RX ORDER — ONDANSETRON 2 MG/ML
4 INJECTION INTRAMUSCULAR; INTRAVENOUS
Status: COMPLETED | OUTPATIENT
Start: 2020-08-19 | End: 2020-08-19

## 2020-08-19 RX ORDER — POTASSIUM CHLORIDE 1.5 G/1.58G
40 POWDER, FOR SOLUTION ORAL
Status: COMPLETED | OUTPATIENT
Start: 2020-08-19 | End: 2020-08-19

## 2020-08-19 RX ORDER — DEXTROSE MONOHYDRATE, SODIUM CHLORIDE, AND POTASSIUM CHLORIDE 50; 2.98; 4.5 G/1000ML; G/1000ML; G/1000ML
1000 INJECTION, SOLUTION INTRAVENOUS
Status: COMPLETED | OUTPATIENT
Start: 2020-08-19 | End: 2020-08-19

## 2020-08-19 RX ORDER — LANOLIN ALCOHOL/MO/W.PET/CERES
400 CREAM (GRAM) TOPICAL ONCE
Status: DISCONTINUED | OUTPATIENT
Start: 2020-08-20 | End: 2020-08-20

## 2020-08-19 RX ORDER — POTASSIUM CHLORIDE 14.9 MG/ML
40 INJECTION INTRAVENOUS
Status: DISCONTINUED | OUTPATIENT
Start: 2020-08-19 | End: 2020-08-19

## 2020-08-19 RX ADMIN — SODIUM CHLORIDE 1000 ML: 0.9 INJECTION, SOLUTION INTRAVENOUS at 09:08

## 2020-08-19 RX ADMIN — SODIUM CHLORIDE 1000 ML: 0.9 INJECTION, SOLUTION INTRAVENOUS at 10:08

## 2020-08-19 RX ADMIN — ONDANSETRON HYDROCHLORIDE 4 MG: 2 SOLUTION INTRAMUSCULAR; INTRAVENOUS at 10:08

## 2020-08-19 RX ADMIN — DEXTROSE MONOHYDRATE, SODIUM CHLORIDE, AND POTASSIUM CHLORIDE 1000 ML: 50; 4.5; 2.98 INJECTION, SOLUTION INTRAVENOUS at 11:08

## 2020-08-19 RX ADMIN — POTASSIUM CHLORIDE 40 MEQ: 1.5 POWDER, FOR SOLUTION ORAL at 10:08

## 2020-08-19 NOTE — Clinical Note
"Jayson "Terrell Berkowitz was seen and treated in our emergency department on 8/19/2020.     COVID-19 is present in our communities across the state. There is limited testing for COVID at this time, so not all patients can be tested. In this situation, your employee meets the following criteria:    Jayson Berkowitz has expressed a desire/need to be tested for the COVID-19 virus but has none of the symptoms that one would associate with COVID-19. In the absence of symptoms, the patient does NOT meet the criteria for testing and should proceed with their work schedule as planned, following the routine infection control policies of the employer, as well as good hand hygiene.      If you have any questions or concerns, or if I can be of further assistance, please do not hesitate to contact me.    Sincerely,             Tio Pack PA-C"

## 2020-08-20 VITALS
WEIGHT: 185 LBS | HEART RATE: 87 BPM | OXYGEN SATURATION: 98 % | TEMPERATURE: 98 F | HEIGHT: 74 IN | SYSTOLIC BLOOD PRESSURE: 138 MMHG | DIASTOLIC BLOOD PRESSURE: 89 MMHG | RESPIRATION RATE: 20 BRPM | BODY MASS INDEX: 23.74 KG/M2

## 2020-08-20 PROCEDURE — 25000003 PHARM REV CODE 250: Performed by: PHYSICIAN ASSISTANT

## 2020-08-20 RX ORDER — KETOROLAC TROMETHAMINE 30 MG/ML
15 INJECTION, SOLUTION INTRAMUSCULAR; INTRAVENOUS
Status: DISCONTINUED | OUTPATIENT
Start: 2020-08-20 | End: 2020-08-20 | Stop reason: HOSPADM

## 2020-08-20 RX ORDER — LANOLIN ALCOHOL/MO/W.PET/CERES
400 CREAM (GRAM) TOPICAL ONCE
Status: COMPLETED | OUTPATIENT
Start: 2020-08-20 | End: 2020-08-20

## 2020-08-20 RX ORDER — ONDANSETRON 4 MG/1
4 TABLET, FILM COATED ORAL EVERY 8 HOURS PRN
Qty: 12 TABLET | Refills: 0 | Status: ON HOLD | OUTPATIENT
Start: 2020-08-20 | End: 2020-09-16 | Stop reason: HOSPADM

## 2020-08-20 RX ADMIN — MAGNESIUM OXIDE TAB 400 MG (241.3 MG ELEMENTAL MG) 400 MG: 400 (241.3 MG) TAB at 12:08

## 2020-08-20 NOTE — ED PROVIDER NOTES
Encounter Date: 8/19/2020       History     Chief Complaint   Patient presents with    Nausea     Pt reports feeling NV, dehydration for the past 3 days, reports trying to drink water to stay hydrate. Pt wants covid test      Chief Complaint:  Nausea vomiting  History of  Present Illness: History obtained from patient. This 44 y.o. male who has past medical history of substance abuse and gastritis presents to the ED complaining of nausea and vomiting for 3 days.  Patient reports 10 episodes of nonbloody nonbilious emesis in last 24 hr.  Patient states he has had similar episodes of nausea vomiting in the past which have been attributed to marijuana use.  He reports last marijuana use approximately 4 days ago.  He reports mild improvement of symptoms after smoking is states that symptoms improved after taking hot showers.  Denies abdominal pain, diarrhea, chest pain, shortness of breath, cough, fever, urinary symptoms, dizziness, weakness.  No recent sick contacts.  No recent consumption of raw or undercooked foods.        Review of patient's allergies indicates:  No Known Allergies  Past Medical History:   Diagnosis Date    Addiction to drug     Gastritis     GERD (gastroesophageal reflux disease)     Hallucination     Hypertension     Renal disorder     Sleep difficulties     Substance abuse      Past Surgical History:   Procedure Laterality Date    LEG SURGERY Right     NO PAST SURGERIES  11/21/2019     Family History   Problem Relation Age of Onset    Arthritis Mother     Hearing loss Father     Crohn's disease Sister      Social History     Tobacco Use    Smoking status: Current Some Day Smoker     Packs/day: 0.25     Years: 3.00     Pack years: 0.75    Smokeless tobacco: Never Used   Substance Use Topics    Alcohol use: Not Currently    Drug use: Yes     Comment: shena daily     Review of Systems   Constitutional: Negative for chills and fever.   HENT: Negative for congestion, rhinorrhea and  sore throat.    Eyes: Negative for visual disturbance.   Respiratory: Negative for cough and shortness of breath.    Cardiovascular: Negative for chest pain.   Gastrointestinal: Positive for nausea and vomiting. Negative for abdominal pain and diarrhea.   Genitourinary: Negative for dysuria, frequency and hematuria.   Musculoskeletal: Negative for back pain.   Skin: Negative for rash.   Neurological: Negative for dizziness, weakness and headaches.       Physical Exam     Initial Vitals [08/19/20 1958]   BP Pulse Resp Temp SpO2   (!) 141/91 88 20 99.7 °F (37.6 °C) 97 %      MAP       --         Physical Exam    Nursing note and vitals reviewed.  Constitutional: He appears well-developed and well-nourished. No distress.   HENT:   Head: Normocephalic and atraumatic.   Right Ear: Tympanic membrane normal.   Left Ear: Tympanic membrane normal.   Nose: Nose normal.   Mouth/Throat: Uvula is midline, oropharynx is clear and moist and mucous membranes are normal.   Eyes: EOM are normal. Pupils are equal, round, and reactive to light.   Neck: Trachea normal, normal range of motion, full passive range of motion without pain and phonation normal. Neck supple. No stridor present. No spinous process tenderness and no muscular tenderness present. Normal range of motion present. No neck rigidity.   Cardiovascular: Normal rate, regular rhythm and normal heart sounds. Exam reveals no gallop and no friction rub.    No murmur heard.  Pulmonary/Chest: Effort normal and breath sounds normal. No respiratory distress. He has no wheezes. He has no rhonchi. He has no rales.   Abdominal: Soft. Bowel sounds are normal. He exhibits no mass. There is no abdominal tenderness. There is no rebound and no guarding.   Musculoskeletal: Normal range of motion.   Neurological: He is alert and oriented to person, place, and time. He has normal strength. No cranial nerve deficit or sensory deficit.   Skin: Skin is warm and dry. Capillary refill takes less  than 2 seconds.   Psychiatric: He has a normal mood and affect.         ED Course   Procedures  Labs Reviewed   CBC W/ AUTO DIFFERENTIAL - Abnormal; Notable for the following components:       Result Value    RDW 14.8 (*)     All other components within normal limits   COMPREHENSIVE METABOLIC PANEL - Abnormal; Notable for the following components:    Potassium 2.8 (*)     CO2 30 (*)     Glucose 112 (*)     BUN, Bld 27 (*)     Creatinine 2.1 (*)     Total Protein 8.8 (*)     eGFR if  44 (*)     eGFR if non  38 (*)     All other components within normal limits   LIPASE - Abnormal; Notable for the following components:    Lipase 148 (*)     All other components within normal limits   URINALYSIS, REFLEX TO URINE CULTURE - Abnormal; Notable for the following components:    Appearance, UA Cloudy (*)     Protein, UA 3+ (*)     Ketones, UA Trace (*)     Bilirubin (UA) 1+ (*)     Occult Blood UA 2+ (*)     Urobilinogen, UA 4.0-6.0 (*)     All other components within normal limits    Narrative:     Specimen Source->Urine   URINALYSIS MICROSCOPIC - Abnormal; Notable for the following components:    WBC, UA 7 (*)     All other components within normal limits    Narrative:     Specimen Source->Urine   ISTAT PROCEDURE - Abnormal; Notable for the following components:    POC Glucose 113 (*)     POC Creatinine 1.5 (*)     POC Anion Gap 18 (*)     All other components within normal limits     EKG Readings: (Independently Interpreted)   Initial Reading: No STEMI. Rhythm: Sinus Bradycardia. Heart Rate: 53. Ectopy: No Ectopy. Conduction: Normal. ST Segments: Normal ST Segments. T Waves: Normal. Axis: Normal. Clinical Impression: Sinus Bradycardia Other Impression: QTc 426     ECG Results          EKG 12-lead (Final result)  Result time 08/20/20 20:57:51    Final result by Interface, Lab In [facility] (08/20/20 20:57:51)                 Narrative:    Test Reason : E87.6,    Vent. Rate : 053 BPM     Atrial  Rate : 053 BPM     P-R Int : 120 ms          QRS Dur : 094 ms      QT Int : 454 ms       P-R-T Axes : 075 087 072 degrees     QTc Int : 426 ms    Sinus bradycardia  Otherwise normal ECG  When compared with ECG of 13-OCT-2019 14:29,  Significant changes have occurred  Confirmed by Bhaskar Jennings MD ([id number]) on 8/20/2020 8:57:42 PM    Referred By: [email or web address]   SELF           Confirmed By:Bhaskar Jennings MD                             EKG 12-LEAD (Final result)  Result time 08/20/20 13:56:33    Final result by Unknown User (08/20/20 13:56:33)                                Imaging Results    None          Medical Decision Making:   Clinical Tests:   Lab Tests: Ordered and Reviewed  ED Management:  This is an evaluation of a 44 y.o. male who presents to the ED for nausea and vomiting.  Vital signs are stable.   Afebrile.  Patient is nontoxic appearing and in no acute distress. Physical exam benign.     CMP shows hypokalemia with potassium of 2.8. BUN and Creatinine elevated at 27 and 2.1. QTc within normal limits. Patient given IV fluids, potassiumd replacement and magnesium in the ED. ISTAT Chem 8 shows normalized potassium of 3.5 and Creatinine of 1.5 Given improvement of labs with ability to tolerate PO in the ED without difficulty, I believe the patient is safe to discharge home at this time.     Patient given return precautions and instructed to return to the emergency department for any new or worsening symptoms. Patient verbalized understanding and agreed with plan.     I discussed the case with Dr. Jiménez who is in agreement with my assessment and plan.                                   Clinical Impression:       ICD-10-CM ICD-9-CM   1. Dehydration  E86.0 276.51   2. Hypokalemia  E87.6 276.8   3. Marijuana use  F12.90 305.20             ED Disposition Condition    Discharge Stable        ED Prescriptions     Medication Sig Dispense Start Date End Date Auth. Provider    ondansetron (ZOFRAN) 4 MG tablet Take 1  tablet (4 mg total) by mouth every 8 (eight) hours as needed for Nausea. 12 tablet 8/20/2020  Tio Pack PA-C        Follow-up Information     Follow up With Specialties Details Why Contact Info    [facility]  Schedule an appointment as soon as possible for a visit in 1 day For reevaluation [address] [place] [address]  [phone]      [facility] Emergency Medicine Go in 1 day If symptoms worsen [address] [facility] [id number] [phone]                                     Tio Pack PA-C  08/24/20 2856

## 2020-08-20 NOTE — FIRST PROVIDER EVALUATION
Emergency Department TeleTRIAGE Encounter Note      CHIEF COMPLAINT    Chief Complaint   Patient presents with    Nausea     Pt reports feeling NV, dehydration for the past 3 days, reports trying to drink water to stay hydrate. Pt wants covid test        VITAL SIGNS   Initial Vitals [08/19/20 1958]   BP Pulse Resp Temp SpO2   (!) 141/91 88 20 99.7 °F (37.6 °C) 97 %      MAP       --            ALLERGIES    Review of patient's allergies indicates:  No Known Allergies    PROVIDER TRIAGE NOTE  Patient is a 44 y.o. male presenting to the ED for nausea and vomiting. Concerned he is dehydrated. Decreased PO intake. No diarrhea. No fever, cough, SOB. Reports 10 episodes of emesis in the past 3 days. Took zofran and phenergan at home, last dose at 12pm.       ORDERS  Labs Reviewed - No data to display    ED Orders (720h ago, onward)    Start Ordered     Status Ordering Provider    08/19/20 2007 08/19/20 2006  CBC auto differential  STAT      Ordered SHANIKA SETH    08/19/20 2007 08/19/20 2006  Comprehensive metabolic panel  STAT      Ordered SHANIKA SETH    08/19/20 2007 08/19/20 2006  Lipase  STAT      Ordered SHANIKA SETH    08/19/20 2007 08/19/20 2006  Saline lock IV  Once      Ordered SHANIKA SETH    08/19/20 2007 08/19/20 2006  Urinalysis, Reflex to Urine Culture Urine, Clean Catch  STAT      Ordered SHANIKA SETH            Virtual Visit Note: The provider triage portion of this emergency department evaluation and documentation was performed via [employer], a HIPAA-compliant telemedicine application, in concert with a tele-presenter in the room. A face to face patient evaluation with one of my colleagues will occur once the patient is placed in an emergency department room.      DISCLAIMER: This note was prepared with [employer] voice recognition transcription software. Garbled syntax, mangled pronouns, and other bizarre constructions may be attributed to that software system.

## 2020-08-20 NOTE — RESPIRATORY THERAPY
Results for SUKHDEV DUQUE (MRN [id number]) as of 8/19/2020 23:54   Ref. Range 8/19/2020 23:43   POC Sodium Latest Ref Range: 136 - 145 mmol/L 140   POC Potassium Latest Ref Range: 3.5 - 5.1 mmol/L 3.5   POC Chloride Latest Ref Range: 95 - 110 mmol/L 101   POC Anion Gap Latest Ref Range: 8 - 16 mmol/L 18 (H)   POC BUN Latest Ref Range: 6 - 30 mg/dL 24   POC Creatinine Latest Ref Range: 0.5 - 1.4 mg/dL 1.5 (H)   POC Glucose Latest Ref Range: 70 - 110 mg/dL 113 (H)   POC Ionized Calcium Latest Ref Range: 1.06 - 1.42 mmol/L 1.21   POC Hematocrit Latest Ref Range: 36 - 54 %PCV 47   POC TCO2 (MEASURED) Latest Ref Range: 23 - 29 mmol/L 26   Sample Unknown VENOUS     CHEM 8+ RESULTS reported to MD Jiménez

## 2020-08-20 NOTE — ED TRIAGE NOTES
Patient reports n/v x 3 days. States he feels dehydrated and has been drinking water to keep hydrated. Denies fever, cough, sore throat, diarrhea.

## 2020-09-16 ENCOUNTER — HOSPITAL ENCOUNTER (OUTPATIENT)
Facility: HOSPITAL | Age: 45
Discharge: HOME | End: 2020-09-16
Attending: EMERGENCY MEDICINE | Admitting: EMERGENCY MEDICINE
Payer: OTHER GOVERNMENT

## 2020-09-16 VITALS
DIASTOLIC BLOOD PRESSURE: 93 MMHG | SYSTOLIC BLOOD PRESSURE: 141 MMHG | HEIGHT: 75 IN | HEART RATE: 58 BPM | BODY MASS INDEX: 22.59 KG/M2 | RESPIRATION RATE: 19 BRPM | OXYGEN SATURATION: 96 % | TEMPERATURE: 98 F | WEIGHT: 181.69 LBS

## 2020-09-16 DIAGNOSIS — R10.9 ABDOMINAL PAIN: ICD-10-CM

## 2020-09-16 DIAGNOSIS — R11.2 NAUSEA AND VOMITING, INTRACTABILITY OF VOMITING NOT SPECIFIED, UNSPECIFIED VOMITING TYPE: ICD-10-CM

## 2020-09-16 DIAGNOSIS — R94.31 QT PROLONGATION: ICD-10-CM

## 2020-09-16 DIAGNOSIS — R07.9 CHEST PAIN: ICD-10-CM

## 2020-09-16 DIAGNOSIS — N17.9 AKI (ACUTE KIDNEY INJURY): ICD-10-CM

## 2020-09-16 DIAGNOSIS — R10.13 EPIGASTRIC ABDOMINAL PAIN: Primary | ICD-10-CM

## 2020-09-16 DIAGNOSIS — R11.10 INTRACTABLE VOMITING: ICD-10-CM

## 2020-09-16 DIAGNOSIS — R00.1 BRADYCARDIA: ICD-10-CM

## 2020-09-16 DIAGNOSIS — R11.2 INTRACTABLE VOMITING WITH NAUSEA: ICD-10-CM

## 2020-09-16 LAB
ALBUMIN SERPL BCP-MCNC: 5.1 G/DL (ref 3.5–5.2)
ALP SERPL-CCNC: 114 U/L (ref 55–135)
ALT SERPL W/O P-5'-P-CCNC: 15 U/L (ref 10–44)
ANION GAP SERPL CALC-SCNC: 13 MMOL/L (ref 8–16)
ANION GAP SERPL CALC-SCNC: 8 MMOL/L (ref 8–16)
AST SERPL-CCNC: 16 U/L (ref 10–40)
BASOPHILS # BLD AUTO: 0.04 K/UL (ref 0–0.2)
BASOPHILS NFR BLD: 0.4 % (ref 0–1.9)
BILIRUB SERPL-MCNC: 0.7 MG/DL (ref 0.1–1)
BUN SERPL-MCNC: 28 MG/DL (ref 6–20)
BUN SERPL-MCNC: 33 MG/DL (ref 6–20)
CALCIUM SERPL-MCNC: 10.7 MG/DL (ref 8.7–10.5)
CALCIUM SERPL-MCNC: 9.2 MG/DL (ref 8.7–10.5)
CHLORIDE SERPL-SCNC: 104 MMOL/L (ref 95–110)
CHLORIDE SERPL-SCNC: 97 MMOL/L (ref 95–110)
CK SERPL-CCNC: 182 U/L (ref 20–200)
CO2 SERPL-SCNC: 27 MMOL/L (ref 23–29)
CO2 SERPL-SCNC: 29 MMOL/L (ref 23–29)
CREAT SERPL-MCNC: 1.9 MG/DL (ref 0.5–1.4)
CREAT SERPL-MCNC: 2.6 MG/DL (ref 0.5–1.4)
CTP QC/QA: YES
DIFFERENTIAL METHOD: ABNORMAL
EOSINOPHIL # BLD AUTO: 0.1 K/UL (ref 0–0.5)
EOSINOPHIL NFR BLD: 0.9 % (ref 0–8)
ERYTHROCYTE [DISTWIDTH] IN BLOOD BY AUTOMATED COUNT: 15 % (ref 11.5–14.5)
EST. GFR  (AFRICAN AMERICAN): 33 ML/MIN/1.73 M^2
EST. GFR  (AFRICAN AMERICAN): 49 ML/MIN/1.73 M^2
EST. GFR  (NON AFRICAN AMERICAN): 29 ML/MIN/1.73 M^2
EST. GFR  (NON AFRICAN AMERICAN): 42 ML/MIN/1.73 M^2
ETHANOL SERPL-MCNC: <10 MG/DL
GLUCOSE SERPL-MCNC: 113 MG/DL (ref 70–110)
GLUCOSE SERPL-MCNC: 124 MG/DL (ref 70–110)
HCT VFR BLD AUTO: 52.1 % (ref 40–54)
HGB BLD-MCNC: 17.8 G/DL (ref 14–18)
IMM GRANULOCYTES # BLD AUTO: 0.04 K/UL (ref 0–0.04)
IMM GRANULOCYTES NFR BLD AUTO: 0.4 % (ref 0–0.5)
LIPASE SERPL-CCNC: 30 U/L (ref 4–60)
LYMPHOCYTES # BLD AUTO: 2.9 K/UL (ref 1–4.8)
LYMPHOCYTES NFR BLD: 31.8 % (ref 18–48)
MAGNESIUM SERPL-MCNC: 2.2 MG/DL (ref 1.6–2.6)
MCH RBC QN AUTO: 29.8 PG (ref 27–31)
MCHC RBC AUTO-ENTMCNC: 34.2 G/DL (ref 32–36)
MCV RBC AUTO: 87 FL (ref 82–98)
MONOCYTES # BLD AUTO: 0.8 K/UL (ref 0.3–1)
MONOCYTES NFR BLD: 8.8 % (ref 4–15)
NEUTROPHILS # BLD AUTO: 5.3 K/UL (ref 1.8–7.7)
NEUTROPHILS NFR BLD: 57.7 % (ref 38–73)
NRBC BLD-RTO: 0 /100 WBC
PLATELET # BLD AUTO: 290 K/UL (ref 150–350)
PMV BLD AUTO: 9.3 FL (ref 9.2–12.9)
POTASSIUM SERPL-SCNC: 3.1 MMOL/L (ref 3.5–5.1)
POTASSIUM SERPL-SCNC: 3.4 MMOL/L (ref 3.5–5.1)
PROT SERPL-MCNC: 9.5 G/DL (ref 6–8.4)
RBC # BLD AUTO: 5.97 M/UL (ref 4.6–6.2)
SARS-COV-2 RDRP RESP QL NAA+PROBE: NEGATIVE
SODIUM SERPL-SCNC: 137 MMOL/L (ref 136–145)
SODIUM SERPL-SCNC: 141 MMOL/L (ref 136–145)
TROPONIN I SERPL DL<=0.01 NG/ML-MCNC: 0.01 NG/ML (ref 0–0.03)
WBC # BLD AUTO: 9.21 K/UL (ref 3.9–12.7)

## 2020-09-16 PROCEDURE — 36415 COLL VENOUS BLD VENIPUNCTURE: CPT

## 2020-09-16 PROCEDURE — 80048 BASIC METABOLIC PNL TOTAL CA: CPT

## 2020-09-16 PROCEDURE — 99285 EMERGENCY DEPT VISIT HI MDM: CPT | Mod: 25

## 2020-09-16 PROCEDURE — 63600175 PHARM REV CODE 636 W HCPCS: Performed by: EMERGENCY MEDICINE

## 2020-09-16 PROCEDURE — 85025 COMPLETE CBC W/AUTO DIFF WBC: CPT

## 2020-09-16 PROCEDURE — 84484 ASSAY OF TROPONIN QUANT: CPT

## 2020-09-16 PROCEDURE — 25000003 PHARM REV CODE 250: Performed by: EMERGENCY MEDICINE

## 2020-09-16 PROCEDURE — 25000003 PHARM REV CODE 250: Performed by: NURSE PRACTITIONER

## 2020-09-16 PROCEDURE — 80053 COMPREHEN METABOLIC PANEL: CPT

## 2020-09-16 PROCEDURE — 83735 ASSAY OF MAGNESIUM: CPT

## 2020-09-16 PROCEDURE — 96374 THER/PROPH/DIAG INJ IV PUSH: CPT

## 2020-09-16 PROCEDURE — 96375 TX/PRO/DX INJ NEW DRUG ADDON: CPT

## 2020-09-16 PROCEDURE — 83690 ASSAY OF LIPASE: CPT

## 2020-09-16 PROCEDURE — C9113 INJ PANTOPRAZOLE SODIUM, VIA: HCPCS | Performed by: EMERGENCY MEDICINE

## 2020-09-16 PROCEDURE — 93005 ELECTROCARDIOGRAM TRACING: CPT

## 2020-09-16 PROCEDURE — 93010 ELECTROCARDIOGRAM REPORT: CPT | Mod: ,,, | Performed by: INTERNAL MEDICINE

## 2020-09-16 PROCEDURE — 82550 ASSAY OF CK (CPK): CPT

## 2020-09-16 PROCEDURE — 93010 EKG 12-LEAD: ICD-10-PCS | Mod: ,,, | Performed by: INTERNAL MEDICINE

## 2020-09-16 PROCEDURE — 96361 HYDRATE IV INFUSION ADD-ON: CPT

## 2020-09-16 PROCEDURE — G0378 HOSPITAL OBSERVATION PER HR: HCPCS

## 2020-09-16 PROCEDURE — U0002 COVID-19 LAB TEST NON-CDC: HCPCS | Performed by: EMERGENCY MEDICINE

## 2020-09-16 PROCEDURE — 80320 DRUG SCREEN QUANTALCOHOLS: CPT

## 2020-09-16 PROCEDURE — 94761 N-INVAS EAR/PLS OXIMETRY MLT: CPT

## 2020-09-16 RX ORDER — ONDANSETRON 2 MG/ML
4 INJECTION INTRAMUSCULAR; INTRAVENOUS
Status: COMPLETED | OUTPATIENT
Start: 2020-09-16 | End: 2020-09-16

## 2020-09-16 RX ORDER — GLUCAGON 1 MG
1 KIT INJECTION
Status: DISCONTINUED | OUTPATIENT
Start: 2020-09-16 | End: 2020-09-16 | Stop reason: HOSPADM

## 2020-09-16 RX ORDER — PANTOPRAZOLE SODIUM 40 MG/1
40 TABLET, DELAYED RELEASE ORAL DAILY
Status: DISCONTINUED | OUTPATIENT
Start: 2020-09-16 | End: 2020-09-16 | Stop reason: HOSPADM

## 2020-09-16 RX ORDER — ACETAMINOPHEN 325 MG/1
650 TABLET ORAL EVERY 6 HOURS PRN
Status: DISCONTINUED | OUTPATIENT
Start: 2020-09-16 | End: 2020-09-16 | Stop reason: HOSPADM

## 2020-09-16 RX ORDER — IBUPROFEN 200 MG
24 TABLET ORAL
Status: DISCONTINUED | OUTPATIENT
Start: 2020-09-16 | End: 2020-09-16 | Stop reason: HOSPADM

## 2020-09-16 RX ORDER — HALOPERIDOL 5 MG/ML
5 INJECTION INTRAMUSCULAR
Status: COMPLETED | OUTPATIENT
Start: 2020-09-16 | End: 2020-09-16

## 2020-09-16 RX ORDER — IBUPROFEN 200 MG
16 TABLET ORAL
Status: DISCONTINUED | OUTPATIENT
Start: 2020-09-16 | End: 2020-09-16 | Stop reason: HOSPADM

## 2020-09-16 RX ORDER — SODIUM CHLORIDE 0.9 % (FLUSH) 0.9 %
10 SYRINGE (ML) INJECTION
Status: DISCONTINUED | OUTPATIENT
Start: 2020-09-16 | End: 2020-09-16 | Stop reason: HOSPADM

## 2020-09-16 RX ORDER — SODIUM CHLORIDE 9 MG/ML
INJECTION, SOLUTION INTRAVENOUS CONTINUOUS
Status: DISCONTINUED | OUTPATIENT
Start: 2020-09-16 | End: 2020-09-16 | Stop reason: HOSPADM

## 2020-09-16 RX ORDER — AMLODIPINE BESYLATE 5 MG/1
5 TABLET ORAL DAILY
Status: DISCONTINUED | OUTPATIENT
Start: 2020-09-16 | End: 2020-09-16

## 2020-09-16 RX ORDER — PANTOPRAZOLE SODIUM 40 MG/10ML
40 INJECTION, POWDER, LYOPHILIZED, FOR SOLUTION INTRAVENOUS
Status: COMPLETED | OUTPATIENT
Start: 2020-09-16 | End: 2020-09-16

## 2020-09-16 RX ORDER — POTASSIUM CHLORIDE 20 MEQ/1
40 TABLET, EXTENDED RELEASE ORAL ONCE
Status: COMPLETED | OUTPATIENT
Start: 2020-09-16 | End: 2020-09-16

## 2020-09-16 RX ADMIN — PANTOPRAZOLE SODIUM 40 MG: 40 INJECTION, POWDER, FOR SOLUTION INTRAVENOUS at 01:09

## 2020-09-16 RX ADMIN — POTASSIUM CHLORIDE 40 MEQ: 1500 TABLET, EXTENDED RELEASE ORAL at 05:09

## 2020-09-16 RX ADMIN — ONDANSETRON 4 MG: 2 INJECTION INTRAMUSCULAR; INTRAVENOUS at 01:09

## 2020-09-16 RX ADMIN — SODIUM CHLORIDE 1000 ML: 0.9 INJECTION, SOLUTION INTRAVENOUS at 03:09

## 2020-09-16 RX ADMIN — SODIUM CHLORIDE 1000 ML: 0.9 INJECTION, SOLUTION INTRAVENOUS at 01:09

## 2020-09-16 RX ADMIN — SODIUM CHLORIDE: 0.9 INJECTION, SOLUTION INTRAVENOUS at 05:09

## 2020-09-16 RX ADMIN — HALOPERIDOL LACTATE 5 MG: 5 INJECTION, SOLUTION INTRAMUSCULAR at 03:09

## 2020-09-16 NOTE — HPI
"This is 45 y.o male with HTN and substance abuse who presents to the hospital complaining of abdominal pain and nausea/vomiting for 5 days. Patient reports to me his symptoms started 5 days ago after he tried to quit using "mojo" about 6 days ago. His last use "mojo" was 6 days ago. He reports he has had about 20 episodes of emesis since and he could not keep any food or drink down for the last 3 days.  He took Zofran at home without relief. He states his abdominal pain was at mid upper abdomen and worsened with vomiting. Patient admits these symptoms have occurred before whenever he tries to quit "mojo", and he has been admitted in the past for similar symptoms due to acute kidney injury from dehydration from nausea and vomiting. He was seen in ED 4 weeks ago (8/19/2020) for the same problems and was discharged home from ED after given IVF, Zofran.  He denies any fever, chest pain, SOB, cough, hematemesis, melena, dysuria, diarrhea. He denies tobacco smoking and other drug uses. He states he drinks alcohol occasionally.     In ED, covid negative. Chest xray no acute process. Labs with sCr 2.6 and K+ 3.1.  During my exam, patient reports his abdominal pain and nausea/vomiting resolved in ED.    Patient is placed in Observation unit for further evaluation and management ADINA    "

## 2020-09-16 NOTE — NURSING
Pt was given written and verbal discharge instructions. IV access removed, NAD or pain noted. Pt verbalized understanding and the need to make a fu appt. Pt ambulated off unit with personal belongings after being told we were still waiting for a specific lab to be done before discharge.

## 2020-09-16 NOTE — ASSESSMENT & PLAN NOTE
"His last use "mojo" was 6 days ago per patient. Express desire to quit and request a consult for rehabilitation. Per patient, he gets severe nausea/vomiting and dehydration every time he tries to quit.    - Encourage to quit  - Supportive care with anti-emetics.     "

## 2020-09-16 NOTE — PLAN OF CARE
Problem: Adult Inpatient Plan of Care  Goal: Plan of Care Review  Outcome: Ongoing, Progressing     Problem: Adult Inpatient Plan of Care  Goal: Optimal Comfort and Wellbeing  Outcome: Ongoing, Progressing     Problem: Electrolyte Imbalance (Acute Kidney Injury/Impairment)  Goal: Serum Electrolyte Balance  Outcome: Ongoing, Progressing

## 2020-09-16 NOTE — ASSESSMENT & PLAN NOTE
-150's upon ED arrival. Patient has hx HTN, but he doesn't take Amlodipine anymore since last month.  - Continue to monitor. Will re-start Amlodipine as needed

## 2020-09-16 NOTE — PROGRESS NOTES
Discharge Instructions for Acute Kidney Injury  You have been diagnosed with acute kidney injury. This means that your kidneys are not working properly. When both kidneys are healthy, they help filter out fluid and waste from the blood and body. Acute kidney injury has many causes. These include urinary blockages, infection, lack of enough blood supply, and medicines that can injure kidneys. In some cases, acute kidney injury is short-term (temporary), lasting several days to a few months. This is because the kidney can repair itself. But acute kidney injury can also result in chronic kidney disease or end stage renal failure. Here are some instructions for you to follow as you recover.  Home care  · Follow any instructions for eating and drinking given to you by your healthcare provider.  ? Drink less fluid, if instructed by your healthcare provider.  ? Keep a record of everything you eat and drink.  · Measure the amount of urine and stool you have each day.  · Weigh yourself every day, at the same time of day, and in the same kind of clothes. Keep a daily record of your daily weights.  · Take your temperature every day. Keep a record of the results.  · Learn to take your own blood pressure. Keep a record of your results. Ask your healthcare provider when you should seek emergency medical attention. Your provider will tell you which blood pressure reading is dangerous.  · Avoid contact with people who have infections (colds, bronchitis, or skin conditions).  · Practice good personal hygiene. This is especially important if you have a catheter in place when you leave the hospital. Doing so helps keep you safe from infection.  · Take your medicines exactly as directed.  · You may require frequent blood and urine tests to monitor your kidney function.  Follow-up care  Follow up with your healthcare provider, or as advised.  When to seek medical care  Call your healthcare provider right away if you have any of the  following:  · Signs of bladder infection (urinating more often than usual, or burning, pain, bleeding, or hesitancy when you urinate)  · Signs of infection around your catheter (redness, swelling, warmth, or drainage)  · Rapid weight loss or weight gain, such as 3 pounds or more in 24 hours or 6 pounds or more in 7 days  · Fever above 100.4°F (38.0°C) or chills  · Muscle aches  · Night sweats  · Very little or no urine output  · Swelling of your hands, legs, or feet  · Back pain  · Abdominal pain  · Extreme tiredness

## 2020-09-16 NOTE — ASSESSMENT & PLAN NOTE
Improved/resolved in ED after IV Zofran. Denies any abdominal pain/nausea/vomiting during my exam. Likely from marijuana uses/withdrawals.   - Supportive care with anti-emetics as needed

## 2020-09-16 NOTE — ASSESSMENT & PLAN NOTE
sCr 2.6 today. Previous 2.1 on 8/19/2020. Baseline 1.2-1.4. Likely 2/2 dehydration from nausea/vomiting and poor PO intake.    - Continue IVF  - Repeat lab in AM  - Avoid nephrotoxins and renal dose medications

## 2020-09-16 NOTE — PROGRESS NOTES
WRITTEN HEALTHCARE DISCHARGE INFORMATION      Things that YOU are RESPONSIBLE for to Manage Your Care At Home:     1. Getting your prescriptions filled.  2. Taking you medications as directed. DO NOT MISS ANY DOSES!  3. Going to your follow-up doctor appointments. This is important because it allows the doctor to monitor your progress and to determine if any changes need to be made to your treatment plan.     If you are unable to make your follow up appointments, please call the number listed and reschedule this appointment.      ____________HELP AT HOME____________________     Experiencing any SIGNS or SYMPTOMS: YOU CAN     Schedule a same day appopintment with your Primary Care Doctor or  you can call [facility] On Call [facility] Care Line for 24/7 assistance at [phone]     If you are experience any signs or symptoms that have become severe, Call 911 and come to your nearest Emergency Room.     Thank you for choosing [facility] and allowing us to care for you.   From your care management team:      You should receive a call from [facility] Discharge [facility] within 48-72 hours to help manage your care after discharge. Please try to make sure that you answer your phone for this important phone call.    Follow-up Information     [facility].    Why: please call at time of discharge to schedule post hospital discharge follow up and establish care for future medical needs   Contact information:  [address] [place] [address]  [phone]

## 2020-09-16 NOTE — DISCHARGE SUMMARY
"[facility] Hospital Medicine  Discharge Summary      Patient Name: Jayson Berkowitz  MRN: [id number]  Admission Date: 9/16/2020  Hospital Length of Stay: 0 days  Discharge Date and Time:  09/16/2020 12:33 PM  Attending Physician: No att. providers found   Discharging Provider: Benita Pack NP  Primary Care Provider: Primary Doctor Desiree      HPI:   This is 45 y.o male with HTN and substance abuse who presents to the hospital complaining of abdominal pain and nausea/vomiting for 5 days. Patient reports to me his symptoms started 5 days ago after he tried to quit using "mojo" about 6 days ago. His last use "mojo" was 6 days ago. He reports he has had about 20 episodes of emesis since and he could not keep any food or drink down for the last 3 days.  He took Zofran at home without relief. He states his abdominal pain was at mid upper abdomen and worsened with vomiting. Patient admits these symptoms have occurred before whenever he tries to quit "mojo", and he has been admitted in the past for similar symptoms due to acute kidney injury from dehydration from nausea and vomiting. He was seen in ED 4 weeks ago (8/19/2020) for the same problems and was discharged home from ED after given IVF, Zofran.  He denies any fever, chest pain, SOB, cough, hematemesis, melena, dysuria, diarrhea. He denies tobacco smoking and other drug uses. He states he drinks alcohol occasionally.     In ED, covid negative. Chest xray no acute process. Labs with sCr 2.6 and K+ 3.1.  During my exam, patient reports his abdominal pain and nausea/vomiting resolved in ED.    Patient is placed in Observation unit for further evaluation and management ADINA      * No surgery found *      Hospital Course:   Mr. Berkowitz is a 44 yo placed in observation for ADINA due to nausea and vomiting. ADINA improved with IVF but not to baseline. Plan to continue IVF and repeat RFP at noon however patient left AMA.      Consults:     No new Assessment & Plan " notes have been filed under this hospital service since the last note was generated.  Service: Hospital Medicine    Final Active Diagnoses:    Diagnosis Date Noted POA    PRINCIPAL PROBLEM:  Acute kidney injury [N17.9] 11/21/2019 Yes    Intractable vomiting [R11.10] 09/16/2020 Yes    Essential hypertension [I10] 10/13/2019 Yes     Chronic    Marijuana use [F12.90] 08/23/2019 Yes    Hypokalemia [E87.6] 04/21/2015 Yes      Problems Resolved During this Admission:       Discharged Condition: against medical advice    Disposition: Home or Self Care    Follow Up:  Follow-up Information     [facility].    Why: please call at time of discharge to schedule post hospital discharge follow up and establish care for future medical needs   Contact information:  [address] [place] [id number] [phone]                 Patient Instructions:      Diet Cardiac     Notify your health care provider if you experience any of the following:  temperature >100.4     Notify your health care provider if you experience any of the following:  difficulty breathing or increased cough     Notify your health care provider if you experience any of the following:  increased confusion or weakness     Notify your health care provider if you experience any of the following:  persistent nausea and vomiting or diarrhea     Activity as tolerated       Significant Diagnostic Studies: Labs: All labs within the past 24 hours have been reviewed    Pending Diagnostic Studies:     Procedure Component Value Units Date/Time    EKG 12-lead [[id number]] Collected: 09/16/20 1021    Order Status: Sent Lab Status: In process Updated: 09/16/20 1130    Narrative:      Test Reason : R00.1,    Vent. Rate : 057 BPM     Atrial Rate : 057 BPM     P-R Int : 118 ms          QRS Dur : 098 ms      QT Int : 442 ms       P-R-T Axes : 071 081 065 degrees     QTc Int : 430 ms    Sinus bradycardia with Premature atrial complexes  Minimal voltage criteria  for LVH, may be normal variant  Borderline Abnormal ECG  When compared with ECG of 19-AUG-2020 23:55,  Significant changes have occurred    Referred By: [email or web address]   SELF           Confirmed By:     EKG 12-lead [[id number]]     Order Status: Sent Lab Status: No result     Renal function panel [[id number]]     Order Status: Sent Lab Status: No result     Specimen: Blood          Medications:  Reconciled Home Medications:       Indwelling Lines/Drains at time of discharge:   Lines/Drains/Airways     None                 Time spent on the discharge of patient: Left JORGEA         Benita Pack NP  Department of Hospital Medicine  [facility]

## 2020-09-16 NOTE — SUBJECTIVE & OBJECTIVE
Past Medical History:   Diagnosis Date    Addiction to drug     Gastritis     GERD (gastroesophageal reflux disease)     Hallucination     Hypertension     Renal disorder     Sleep difficulties     Substance abuse        Past Surgical History:   Procedure Laterality Date    LEG SURGERY Right     NO PAST SURGERIES  11/21/2019       Review of patient's allergies indicates:  No Known Allergies    No current facility-administered medications on file prior to encounter.      Current Outpatient Medications on File Prior to Encounter   Medication Sig    ondansetron (ZOFRAN) 4 MG tablet Take 1 tablet (4 mg total) by mouth every 8 (eight) hours as needed for Nausea.    amLODIPine (NORVASC) 10 MG tablet Take 1 tablet (10 mg total) by mouth once daily.    promethazine (PHENERGAN) 25 MG suppository Place 1 suppository (25 mg total) rectally every 6 (six) hours as needed for Nausea.     Family History     Problem Relation (Age of Onset)    Arthritis Mother    Crohn's disease Sister    Hearing loss Father        Tobacco Use    Smoking status: Former Smoker     Packs/day: 0.25     Years: 3.00     Pack years: 0.75    Smokeless tobacco: Never Used   Substance and Sexual Activity    Alcohol use: Not Currently    Drug use: Yes     Comment: mojo daily    Sexual activity: Yes     Partners: Female     Birth control/protection: None     Review of Systems   Constitutional: Positive for fatigue. Negative for chills, diaphoresis and fever.   HENT: Negative for congestion and sore throat.    Eyes: Negative for visual disturbance.   Respiratory: Negative for cough, chest tightness, shortness of breath and wheezing.    Cardiovascular: Negative for chest pain, palpitations and leg swelling.   Gastrointestinal: Positive for abdominal pain (resolved in ED), nausea (resolved in ED) and vomiting (resolved in ED). Negative for blood in stool and diarrhea.   Genitourinary: Positive for flank pain (right side - mild). Negative for  dysuria and hematuria.   Musculoskeletal: Negative for arthralgias, myalgias, neck pain and neck stiffness.   Skin: Negative for rash.   Neurological: Negative for dizziness, tremors, weakness, numbness and headaches.   Psychiatric/Behavioral: Negative for confusion.     Objective:     Vital Signs (Most Recent):  Temp: 98 °F (36.7 °C) (09/16/20 0542)  Pulse: 65 (09/16/20 0542)  Resp: 18 (09/16/20 0542)  BP: (!) 163/95 (09/16/20 0542)  SpO2: 98 % (09/16/20 0542) Vital Signs (24h Range):  Temp:  [98 °F (36.7 °C)-98.7 °F (37.1 °C)] 98 °F (36.7 °C)  Pulse:  [56-82] 65  Resp:  [18] 18  SpO2:  [96 %-98 %] 98 %  BP: (105-163)/(51-95) 163/95     Weight: 82.4 kg (181 lb 10.5 oz)  Body mass index is 22.71 kg/m².    Physical Exam  Constitutional:       General: He is not in acute distress.     Appearance: Normal appearance. He is not ill-appearing or diaphoretic.   HENT:      Head: Normocephalic and atraumatic.      Nose: No congestion or rhinorrhea.      Mouth/Throat:      Mouth: Mucous membranes are dry.   Eyes:      Pupils: Pupils are equal, round, and reactive to light.   Neck:      Musculoskeletal: Normal range of motion and neck supple.   Cardiovascular:      Rate and Rhythm: Normal rate and regular rhythm.      Pulses: Normal pulses.      Heart sounds: Normal heart sounds.   Pulmonary:      Effort: Pulmonary effort is normal. No respiratory distress.      Breath sounds: Normal breath sounds. No wheezing, rhonchi or rales.   Chest:      Chest wall: No tenderness.   Abdominal:      General: Bowel sounds are normal.      Palpations: Abdomen is soft.      Tenderness: There is no abdominal tenderness. There is right CVA tenderness (mild). There is no left CVA tenderness, guarding or rebound.   Musculoskeletal: Normal range of motion.      Right lower leg: No edema.      Left lower leg: No edema.   Skin:     General: Skin is warm and dry.   Neurological:      General: No focal deficit present.      Mental Status: He is alert  and oriented to person, place, and time.   Psychiatric:         Mood and Affect: Mood normal.         Thought Content: Thought content normal.         Judgment: Judgment normal.           CRANIAL NERVES     CN III, IV, VI   Pupils are equal, round, and reactive to light.       Significant Labs:   CBC:   Recent Labs   Lab 09/16/20 0157   WBC 9.21   HGB 17.8   HCT 52.1        CMP:   Recent Labs   Lab 09/16/20 0157      K 3.1*   CL 97   CO2 27   *   BUN 33*   CREATININE 2.6*   CALCIUM 10.7*   PROT 9.5*   ALBUMIN 5.1   BILITOT 0.7   ALKPHOS 114   AST 16   ALT 15   ANIONGAP 13   EGFRNONAA 29*     Lipase:   Recent Labs   Lab 09/16/20 0157   LIPASE 30     Magnesium:   Recent Labs   Lab 09/16/20 0157   MG 2.2     Urine Studies: No results for input(s): COLORU, APPEARANCEUA, PHUR, SPECGRAV, PROTEINUA, GLUCUA, KETONESU, BILIRUBINUA, OCCULTUA, NITRITE, UROBILINOGEN, LEUKOCYTESUR, RBCUA, WBCUA, BACTERIA, SQUAMEPITHEL, HYALINECASTS in the last 48 hours.    Invalid input(s): YUNIR  All pertinent labs within the past 24 hours have been reviewed.    Significant Imaging: I have reviewed and interpreted all pertinent imaging results/findings within the past 24 hours.

## 2020-09-16 NOTE — H&P
"[facility] Hospital Medicine  History & Physical    Patient Name: Jayson Berkowitz  MRN: [id number]  Admission Date: 9/16/2020  Attending Physician: Terence Ann MD   Primary Care Provider: Primary Doctor No         Patient information was obtained from patient and ER records.     Subjective:     Principal Problem:Acute kidney injury    Chief Complaint:   Chief Complaint   Patient presents with    Abdominal Pain     Pt c/o generalized abdominal pain, nausea and vomiting x4-5 days. Pt denies any diarrhea or urinary symptoms.        HPI: This is 45 y.o male with HTN and substance abuse who presents to the hospital complaining of abdominal pain and nausea/vomiting for 5 days. Patient reports to me his symptoms started 5 days ago after he tried to quit using "mojo" about 6 days ago. His last use "mojo" was 6 days ago. He reports he has had about 20 episodes of emesis since and he could not keep any food or drink down for the last 3 days.  He took Zofran at home without relief. He states his abdominal pain was at mid upper abdomen and worsened with vomiting. Patient admits these symptoms have occurred before whenever he tries to quit "mojo", and he has been admitted in the past for similar symptoms due to acute kidney injury from dehydration from nausea and vomiting. He was seen in ED 4 weeks ago (8/19/2020) for the same problems and was discharged home from ED after given IVF, Zofran.  He denies any fever, chest pain, SOB, cough, hematemesis, melena, dysuria, diarrhea. He denies tobacco smoking and other drug uses. He states he drinks alcohol occasionally.     In ED, covid negative. Chest xray no acute process. Labs with sCr 2.6 and K+ 3.1.  During my exam, patient reports his abdominal pain and nausea/vomiting resolved in ED.    Patient is placed in Observation unit for further evaluation and management ADINA      Past Medical History:   Diagnosis Date    Addiction to drug     Gastritis     GERD " (gastroesophageal reflux disease)     Hallucination     Hypertension     Renal disorder     Sleep difficulties     Substance abuse        Past Surgical History:   Procedure Laterality Date    LEG SURGERY Right     NO PAST SURGERIES  11/21/2019       Review of patient's allergies indicates:  No Known Allergies    No current facility-administered medications on file prior to encounter.      Current Outpatient Medications on File Prior to Encounter   Medication Sig    ondansetron (ZOFRAN) 4 MG tablet Take 1 tablet (4 mg total) by mouth every 8 (eight) hours as needed for Nausea.    amLODIPine (NORVASC) 10 MG tablet Take 1 tablet (10 mg total) by mouth once daily.    promethazine (PHENERGAN) 25 MG suppository Place 1 suppository (25 mg total) rectally every 6 (six) hours as needed for Nausea.     Family History     Problem Relation (Age of Onset)    Arthritis Mother    Crohn's disease Sister    Hearing loss Father        Tobacco Use    Smoking status: Former Smoker     Packs/day: 0.25     Years: 3.00     Pack years: 0.75    Smokeless tobacco: Never Used   Substance and Sexual Activity    Alcohol use: Not Currently    Drug use: Yes     Comment: mojo daily    Sexual activity: Yes     Partners: Female     Birth control/protection: None     Review of Systems   Constitutional: Positive for fatigue. Negative for chills, diaphoresis and fever.   HENT: Negative for congestion and sore throat.    Eyes: Negative for visual disturbance.   Respiratory: Negative for cough, chest tightness, shortness of breath and wheezing.    Cardiovascular: Negative for chest pain, palpitations and leg swelling.   Gastrointestinal: Positive for abdominal pain (resolved in ED), nausea (resolved in ED) and vomiting (resolved in ED). Negative for blood in stool and diarrhea.   Genitourinary: Positive for flank pain (right side - mild). Negative for dysuria and hematuria.   Musculoskeletal: Negative for arthralgias, myalgias, neck pain  and neck stiffness.   Skin: Negative for rash.   Neurological: Negative for dizziness, tremors, weakness, numbness and headaches.   Psychiatric/Behavioral: Negative for confusion.     Objective:     Vital Signs (Most Recent):  Temp: 98 °F (36.7 °C) (09/16/20 0542)  Pulse: 65 (09/16/20 0542)  Resp: 18 (09/16/20 0542)  BP: (!) 163/95 (09/16/20 0542)  SpO2: 98 % (09/16/20 0542) Vital Signs (24h Range):  Temp:  [98 °F (36.7 °C)-98.7 °F (37.1 °C)] 98 °F (36.7 °C)  Pulse:  [56-82] 65  Resp:  [18] 18  SpO2:  [96 %-98 %] 98 %  BP: (105-163)/(51-95) 163/95     Weight: 82.4 kg (181 lb 10.5 oz)  Body mass index is 22.71 kg/m².    Physical Exam  Constitutional:       General: He is not in acute distress.     Appearance: Normal appearance. He is not ill-appearing or diaphoretic.   HENT:      Head: Normocephalic and atraumatic.      Nose: No congestion or rhinorrhea.      Mouth/Throat:      Mouth: Mucous membranes are dry.   Eyes:      Pupils: Pupils are equal, round, and reactive to light.   Neck:      Musculoskeletal: Normal range of motion and neck supple.   Cardiovascular:      Rate and Rhythm: Normal rate and regular rhythm.      Pulses: Normal pulses.      Heart sounds: Normal heart sounds.   Pulmonary:      Effort: Pulmonary effort is normal. No respiratory distress.      Breath sounds: Normal breath sounds. No wheezing, rhonchi or rales.   Chest:      Chest wall: No tenderness.   Abdominal:      General: Bowel sounds are normal.      Palpations: Abdomen is soft.      Tenderness: There is no abdominal tenderness. There is right CVA tenderness (mild). There is no left CVA tenderness, guarding or rebound.   Musculoskeletal: Normal range of motion.      Right lower leg: No edema.      Left lower leg: No edema.   Skin:     General: Skin is warm and dry.   Neurological:      General: No focal deficit present.      Mental Status: He is alert and oriented to person, place, and time.   Psychiatric:         Mood and Affect: Mood  "normal.         Thought Content: Thought content normal.         Judgment: Judgment normal.           CRANIAL NERVES     CN III, IV, VI   Pupils are equal, round, and reactive to light.       Significant Labs:   CBC:   Recent Labs   Lab 09/16/20 0157   WBC 9.21   HGB 17.8   HCT 52.1        CMP:   Recent Labs   Lab 09/16/20 0157      K 3.1*   CL 97   CO2 27   *   BUN 33*   CREATININE 2.6*   CALCIUM 10.7*   PROT 9.5*   ALBUMIN 5.1   BILITOT 0.7   ALKPHOS 114   AST 16   ALT 15   ANIONGAP 13   EGFRNONAA 29*     Lipase:   Recent Labs   Lab 09/16/20 0157   LIPASE 30     Magnesium:   Recent Labs   Lab 09/16/20 0157   MG 2.2     Urine Studies: No results for input(s): COLORU, APPEARANCEUA, PHUR, SPECGRAV, PROTEINUA, GLUCUA, KETONESU, BILIRUBINUA, OCCULTUA, NITRITE, UROBILINOGEN, LEUKOCYTESUR, RBCUA, WBCUA, BACTERIA, SQUAMEPITHEL, HYALINECASTS in the last 48 hours.    Invalid input(s): YURIDIASUR  All pertinent labs within the past 24 hours have been reviewed.    Significant Imaging: I have reviewed and interpreted all pertinent imaging results/findings within the past 24 hours.    Assessment/Plan:     * Acute kidney injury  sCr 2.6 today. Previous 2.1 on 8/19/2020. Baseline 1.2-1.4. Likely 2/2 dehydration from nausea/vomiting and poor PO intake.    - Continue IVF  - Repeat lab in AM  - Avoid nephrotoxins and renal dose medications      Intractable vomiting  Improved/resolved in ED after IV Zofran. Denies any abdominal pain/nausea/vomiting during my exam. Likely from marijuana uses/withdrawals.   - Supportive care with anti-emetics as needed    Essential hypertension  -150's upon ED arrival. Patient has hx HTN, but he doesn't take Amlodipine anymore since last month.  - Continue to monitor. Will re-start Amlodipine as needed      Marijuana use  His last use "mojo" was 6 days ago per patient. Express desire to quit and request a consult for rehabilitation. Per patient, he gets severe " nausea/vomiting and dehydration every time he tries to quit.    - Encourage to quit  - Supportive care with anti-emetics.       Hypokalemia  K+ 3.1 today.  Replace    - Repeat lab in AM        VTE Risk Mitigation (From admission, onward)         Ordered     IP VTE LOW RISK PATIENT  Once      09/16/20 0442     Place KRISTAL hose  Until discontinued      09/16/20 0442     Place sequential compression device  Until discontinued      09/16/20 0442                   Waldemar Pack NP  Department of Hospital Medicine   [facility]

## 2020-09-16 NOTE — PLAN OF CARE
09/16/20 1030   Discharge Assessment   Assessment Type Discharge Planning Assessment   Assessment information obtained from? Medical Record   Prior to hospitilization cognitive status: Alert/Oriented   Prior to hospitalization functional status: Independent   Current cognitive status: Alert/Oriented   Current Functional Status: Independent   Facility Arrived From: home   Lives With other (see comments)   Able to Return to Prior Arrangements yes   Is patient able to care for self after discharge? Yes   Who are your caregiver(s) and their phone number(s)? [facility]- [phone]   Patient's perception of discharge disposition home or selfcare   Readmission Within the Last 30 Days no previous admission in last 30 days   Patient currently being followed by outpatient case management? No   Patient currently receives any other outside agency services? No   Equipment Currently Used at Home none   Do you have any problems affording any of your prescribed medications? No   Is the patient taking medications as prescribed? yes   Does the patient have transportation home? Yes   Transportation Anticipated family or friend will provide   Does the patient receive services at the [facility]? No   Discharge Plan A Home  (with [facility] information)   DME Needed Upon Discharge  none   Patient/Family in Agreement with Plan yes     [id number] - [place] - [address]  [place] [address]  Phone: [phone] Fax: [phone]

## 2020-09-16 NOTE — ED PROVIDER NOTES
"Encounter Date: 9/16/2020    SCRIBE #1 NOTE: I, Kamala Ayush, am scribing for, and in the presence of,  Antony Bernard MD. I have scribed the entire note.       History     Chief Complaint   Patient presents with    Abdominal Pain     Pt c/o generalized abdominal pain, nausea and vomiting x4-5 days. Pt denies any diarrhea or urinary symptoms.     This is a 46 y/o male with a PMHx of Substance abuse and Hypertension. He presents to the ED with a CC of upper abdominal pain of 3 days. Patient has had roughly 20 episodes of emesis, pain radiating to his back, cough with brown sputum, and palpitations. He describes the pain as "empty," and "tight." Patient has brief moment of relief after vomiting.  No hematemesis.  No melena.  He is unable to keep food or water down. The patient took Zofran without relief. Patient reports these symptoms occur when he is having withdrawals from "Mojo."  He last use majora 3 days ago.  He requests a consult for rehabilitation.  He has been admitted in the past for similar symptoms causing acute kidney injury.  Patient states he feels weak and lightheaded.  He is concerned that he is dehydrated again.  He does not smoke cigarettes. NKDA    The history is provided by the patient.     Review of patient's allergies indicates:  No Known Allergies  Past Medical History:   Diagnosis Date    Addiction to drug     Gastritis     GERD (gastroesophageal reflux disease)     Hallucination     Hypertension     Renal disorder     Sleep difficulties     Substance abuse      Past Surgical History:   Procedure Laterality Date    LEG SURGERY Right     NO PAST SURGERIES  11/21/2019     Family History   Problem Relation Age of Onset    Arthritis Mother     Hearing loss Father     Crohn's disease Sister      Social History     Tobacco Use    Smoking status: Former Smoker     Packs/day: 0.25     Years: 3.00     Pack years: 0.75    Smokeless tobacco: Never Used   Substance Use Topics    " Alcohol use: Not Currently    Drug use: Yes     Comment: shena daily     Review of Systems   Constitutional: Negative for chills, diaphoresis, fatigue and fever.   HENT: Negative for congestion, sinus pain and sore throat.    Respiratory: Positive for cough. Negative for shortness of breath.    Cardiovascular: Positive for palpitations. Negative for chest pain and leg swelling.   Gastrointestinal: Positive for abdominal pain and vomiting. Negative for blood in stool, constipation, diarrhea and nausea.   Genitourinary: Negative for dysuria, flank pain, frequency and hematuria.   Musculoskeletal: Positive for back pain. Negative for joint swelling.   Neurological: Positive for light-headedness. Negative for weakness, numbness and headaches.   Psychiatric/Behavioral: Negative for confusion.       Physical Exam     Initial Vitals [09/16/20 0053]   BP Pulse Resp Temp SpO2   (!) 125/93 82 18 98.7 °F (37.1 °C) 97 %      MAP       --         Physical Exam    Nursing note and vitals reviewed.  Constitutional: He appears well-developed and well-nourished. He is not diaphoretic. No distress.   HENT:   Head: Normocephalic and atraumatic.   Nose: Nose normal.   Mouth/Throat: Oropharynx is clear and moist.   Eyes: Conjunctivae and EOM are normal. Pupils are equal, round, and reactive to light. Right eye exhibits no discharge. Left eye exhibits no discharge. No scleral icterus.   Neck: Normal range of motion. Neck supple. No tracheal deviation present.   Cardiovascular: Normal rate, regular rhythm and normal heart sounds.   No murmur heard.  Pulmonary/Chest: Breath sounds normal. No stridor. No respiratory distress. He has no wheezes. He has no rhonchi. He has no rales.   Abdominal: Soft. Bowel sounds are normal. He exhibits no distension. There is abdominal tenderness (Mild epigastric tenderness). There is no rebound and no guarding.   Musculoskeletal: Normal range of motion.   Neurological: He is alert and oriented to person,  place, and time.   Skin: Skin is warm and dry.   Psychiatric: He has a normal mood and affect. His behavior is normal. Judgment and thought content normal.         ED Course   Procedures  Labs Reviewed   CBC W/ AUTO DIFFERENTIAL   COMPREHENSIVE METABOLIC PANEL   LIPASE   MAGNESIUM          Imaging Results    None          Medical Decision Making:   Initial Assessment:   This is a 44 y/o male with a PMHx of cannabis induced hyperemesis presents to the ED with a CC of upper abdominal pain of 3 days with associated nausea and vomiting.  Mild tenderness epigastric area on exam.  No guarding or rebound.  No peritoneal signs.. Labs will be ordered.  Provide antiemetics.  Patient will be treated for symptoms and reassessed.   Differential Diagnosis:   Cannabis induced hyperemesis, pancreatitis, gastritis, peptic ulcer disease  Clinical Tests:   Lab Tests: Ordered and Reviewed  ED Management:  Symptoms improved.  Lab work reveals acute kidney injury when compared to baseline.  Mild hypokalemia present.  CBC unremarkable.  Lipase normal.  Liver panel is normal.  Will admit to OBS for IV fluids and further antiemetics.            Scribe Attestation:   Scribe #1: I performed the above scribed service and the documentation accurately describes the services I performed. I attest to the accuracy of the note.    Attending Attestation:         Attending Critical Care:   Critical Care Times:   Direct Patient Care (initial evaluation, reassessments, and time considering the case)................................................................10 minutes.   Additional History from reviewing old medical records or taking additional history from the family, EMS, PCP, etc.......................10 minutes.   Ordering, Reviewing, and Interpreting Diagnostic Studies...............................................................................................................10 minutes.    ==============================================================  · Total Critical Care Time - exclusive of procedural time: 30 minutes.  ==============================================================  Critical care was necessary to treat or prevent imminent or life-threatening deterioration of the following conditions: renal failure.   The following critical care procedures were done by me (see procedure notes): pulse oximetry.   Critical care was time spent personally by me on the following activities: obtaining history from patient or relative, examination of patient, review of old charts, ordering and performing treatments and interventions, evaluation of patient's response to treatment, discussion with consultants and re-evaluation of patient's conition.   Critical Care Condition: potentially life-threatening                         Clinical Impression:       ICD-10-CM ICD-9-CM   1. Epigastric abdominal pain  R10.13 789.06   2. Nausea and vomiting, intractability of vomiting not specified, unspecified vomiting type  R11.2 787.01   3. ADINA (acute kidney injury)  N17.9 584.9         Disposition:   Disposition: Placed in Observation  Condition: Stable     I, Antony Bernard MD, personally performed the services described in this documentation. All medical record entries made by the scribe were at my direction and in my presence.  I have reviewed the chart and agree that the record reflects my personal performance and is accurate and complete.                        Antony Bernard MD  09/16/20 0242       Antony Bernard MD  09/25/20 2011

## 2020-09-16 NOTE — PROGRESS NOTES
TN taught Symptoms and Problems for Acute Kidney Injury home care with pt and  with teach back:  1. Low back pain, 2.Fever. TN placed education sheet in [employer] Packet..     Help at home will be from , assisting in pt's recovery Significant other Belle TANG taught patient about things HE is responsible for when discharged TO HELP WITH HIS RECOVERY:  Particularly on how to Manage HIS Care At Home:  1. Getting his prescriptions filled.  2. Taking his medications as directed. DO NOT MISS ANY DOSES!  3. Going to his follow-up doctor appointments.   .  Haydee Acosta RN, BSN, STN CCM

## 2020-09-16 NOTE — NURSING
Pt arrived to unit from ED on stretcher on RA. NAD noted.  Oriented pt to call light and room.  Instructed to call for mobility. Pt has no complaints of N/V or pain.  Will continue to monitor.

## 2020-09-16 NOTE — HOSPITAL COURSE
Mr. Berkowitz is a 44 yo placed in observation for ADINA due to nausea and vomiting. ADINA improved with IVF but not to baseline. Plan to continue IVF and repeat RFP at noon however patient left AMA.

## 2020-10-26 PROCEDURE — 96365 THER/PROPH/DIAG IV INF INIT: CPT

## 2020-10-26 PROCEDURE — 96367 TX/PROPH/DG ADDL SEQ IV INF: CPT

## 2020-10-26 PROCEDURE — 96372 THER/PROPH/DIAG INJ SC/IM: CPT

## 2020-10-26 PROCEDURE — 99285 EMERGENCY DEPT VISIT HI MDM: CPT | Mod: 25

## 2020-10-26 PROCEDURE — 96375 TX/PRO/DX INJ NEW DRUG ADDON: CPT

## 2020-10-27 ENCOUNTER — HOSPITAL ENCOUNTER (EMERGENCY)
Facility: HOSPITAL | Age: 45
Discharge: HOME | End: 2020-10-27
Attending: EMERGENCY MEDICINE
Payer: OTHER GOVERNMENT

## 2020-10-27 VITALS
RESPIRATION RATE: 18 BRPM | OXYGEN SATURATION: 99 % | HEART RATE: 76 BPM | HEIGHT: 74 IN | DIASTOLIC BLOOD PRESSURE: 82 MMHG | WEIGHT: 175 LBS | TEMPERATURE: 99 F | BODY MASS INDEX: 22.46 KG/M2 | SYSTOLIC BLOOD PRESSURE: 147 MMHG

## 2020-10-27 DIAGNOSIS — R10.13 EPIGASTRIC PAIN: ICD-10-CM

## 2020-10-27 DIAGNOSIS — E87.0 HYPERNATREMIA: ICD-10-CM

## 2020-10-27 DIAGNOSIS — R11.2 NON-INTRACTABLE VOMITING WITH NAUSEA, UNSPECIFIED VOMITING TYPE: Primary | ICD-10-CM

## 2020-10-27 DIAGNOSIS — R11.10 EMESIS: ICD-10-CM

## 2020-10-27 LAB
ALBUMIN SERPL BCP-MCNC: 5 G/DL (ref 3.5–5.2)
ALP SERPL-CCNC: 120 U/L (ref 55–135)
ALT SERPL W/O P-5'-P-CCNC: 14 U/L (ref 10–44)
AMPHET+METHAMPHET UR QL: NEGATIVE
ANION GAP SERPL CALC-SCNC: 13 MMOL/L (ref 8–16)
ANION GAP SERPL CALC-SCNC: 16 MMOL/L (ref 8–16)
AST SERPL-CCNC: 21 U/L (ref 10–40)
BACTERIA #/AREA URNS HPF: ABNORMAL /HPF
BARBITURATES UR QL SCN>200 NG/ML: NEGATIVE
BASOPHILS # BLD AUTO: 0.04 K/UL (ref 0–0.2)
BASOPHILS NFR BLD: 0.5 % (ref 0–1.9)
BENZODIAZ UR QL SCN>200 NG/ML: NEGATIVE
BILIRUB SERPL-MCNC: 0.6 MG/DL (ref 0.1–1)
BILIRUB UR QL STRIP: ABNORMAL
BUN SERPL-MCNC: 45 MG/DL (ref 6–20)
BUN SERPL-MCNC: 46 MG/DL (ref 6–20)
BZE UR QL SCN: NEGATIVE
CALCIUM SERPL-MCNC: 10.4 MG/DL (ref 8.7–10.5)
CALCIUM SERPL-MCNC: 9.3 MG/DL (ref 8.7–10.5)
CANNABINOIDS UR QL SCN: NEGATIVE
CHLORIDE SERPL-SCNC: 95 MMOL/L (ref 95–110)
CHLORIDE SERPL-SCNC: 97 MMOL/L (ref 95–110)
CK SERPL-CCNC: 176 U/L (ref 20–200)
CLARITY UR: ABNORMAL
CO2 SERPL-SCNC: 25 MMOL/L (ref 23–29)
CO2 SERPL-SCNC: 28 MMOL/L (ref 23–29)
COLOR UR: ABNORMAL
CREAT SERPL-MCNC: 2.5 MG/DL (ref 0.5–1.4)
CREAT SERPL-MCNC: 3 MG/DL (ref 0.5–1.4)
CREAT UR-MCNC: 400 MG/DL (ref 23–375)
CTP QC/QA: YES
DIFFERENTIAL METHOD: NORMAL
EOSINOPHIL # BLD AUTO: 0.1 K/UL (ref 0–0.5)
EOSINOPHIL NFR BLD: 0.8 % (ref 0–8)
ERYTHROCYTE [DISTWIDTH] IN BLOOD BY AUTOMATED COUNT: 13.6 % (ref 11.5–14.5)
EST. GFR  (AFRICAN AMERICAN): 28 ML/MIN/1.73 M^2
EST. GFR  (AFRICAN AMERICAN): 35 ML/MIN/1.73 M^2
EST. GFR  (NON AFRICAN AMERICAN): 24 ML/MIN/1.73 M^2
EST. GFR  (NON AFRICAN AMERICAN): 30 ML/MIN/1.73 M^2
GLUCOSE SERPL-MCNC: 125 MG/DL (ref 70–110)
GLUCOSE SERPL-MCNC: 137 MG/DL (ref 70–110)
GLUCOSE UR QL STRIP: NEGATIVE
GRAN CASTS #/AREA URNS LPF: 2 /LPF
HCT VFR BLD AUTO: 50.2 % (ref 40–54)
HGB BLD-MCNC: 17.5 G/DL (ref 14–18)
HGB UR QL STRIP: ABNORMAL
HYALINE CASTS #/AREA URNS LPF: 35 /LPF
IMM GRANULOCYTES # BLD AUTO: 0.03 K/UL (ref 0–0.04)
IMM GRANULOCYTES NFR BLD AUTO: 0.4 % (ref 0–0.5)
KETONES UR QL STRIP: NEGATIVE
LEUKOCYTE ESTERASE UR QL STRIP: NEGATIVE
LIPASE SERPL-CCNC: 20 U/L (ref 4–60)
LYMPHOCYTES # BLD AUTO: 2.6 K/UL (ref 1–4.8)
LYMPHOCYTES NFR BLD: 33.2 % (ref 18–48)
MAGNESIUM SERPL-MCNC: 2.6 MG/DL (ref 1.6–2.6)
MCH RBC QN AUTO: 30.8 PG (ref 27–31)
MCHC RBC AUTO-ENTMCNC: 34.9 G/DL (ref 32–36)
MCV RBC AUTO: 88 FL (ref 82–98)
METHADONE UR QL SCN>300 NG/ML: NEGATIVE
MICROSCOPIC COMMENT: ABNORMAL
MONOCYTES # BLD AUTO: 0.7 K/UL (ref 0.3–1)
MONOCYTES NFR BLD: 9 % (ref 4–15)
NEUTROPHILS # BLD AUTO: 4.3 K/UL (ref 1.8–7.7)
NEUTROPHILS NFR BLD: 56.1 % (ref 38–73)
NITRITE UR QL STRIP: NEGATIVE
NRBC BLD-RTO: 0 /100 WBC
OPIATES UR QL SCN: NEGATIVE
PCP UR QL SCN>25 NG/ML: NEGATIVE
PH UR STRIP: 5 [PH] (ref 5–8)
PLATELET # BLD AUTO: 345 K/UL (ref 150–350)
PMV BLD AUTO: 9.5 FL (ref 9.2–12.9)
POTASSIUM SERPL-SCNC: 2.7 MMOL/L (ref 3.5–5.1)
POTASSIUM SERPL-SCNC: 3.1 MMOL/L (ref 3.5–5.1)
PROT SERPL-MCNC: 9.5 G/DL (ref 6–8.4)
PROT UR QL STRIP: ABNORMAL
RBC # BLD AUTO: 5.69 M/UL (ref 4.6–6.2)
RBC #/AREA URNS HPF: 20 /HPF (ref 0–4)
SARS-COV-2 RDRP RESP QL NAA+PROBE: NEGATIVE
SODIUM SERPL-SCNC: 136 MMOL/L (ref 136–145)
SODIUM SERPL-SCNC: 138 MMOL/L (ref 136–145)
SP GR UR STRIP: 1.02 (ref 1–1.03)
TOXICOLOGY INFORMATION: ABNORMAL
URN SPEC COLLECT METH UR: ABNORMAL
UROBILINOGEN UR STRIP-ACNC: ABNORMAL EU/DL
WBC # BLD AUTO: 7.69 K/UL (ref 3.9–12.7)
WBC #/AREA URNS HPF: 3 /HPF (ref 0–5)

## 2020-10-27 PROCEDURE — 25000003 PHARM REV CODE 250: Performed by: EMERGENCY MEDICINE

## 2020-10-27 PROCEDURE — 81000 URINALYSIS NONAUTO W/SCOPE: CPT | Mod: 59

## 2020-10-27 PROCEDURE — 82550 ASSAY OF CK (CPK): CPT

## 2020-10-27 PROCEDURE — 80048 BASIC METABOLIC PNL TOTAL CA: CPT

## 2020-10-27 PROCEDURE — 93010 ELECTROCARDIOGRAM REPORT: CPT | Mod: ,,, | Performed by: INTERNAL MEDICINE

## 2020-10-27 PROCEDURE — 93005 ELECTROCARDIOGRAM TRACING: CPT

## 2020-10-27 PROCEDURE — 80053 COMPREHEN METABOLIC PANEL: CPT

## 2020-10-27 PROCEDURE — 80307 DRUG TEST PRSMV CHEM ANLYZR: CPT

## 2020-10-27 PROCEDURE — 83690 ASSAY OF LIPASE: CPT

## 2020-10-27 PROCEDURE — U0002 COVID-19 LAB TEST NON-CDC: HCPCS | Performed by: EMERGENCY MEDICINE

## 2020-10-27 PROCEDURE — 83735 ASSAY OF MAGNESIUM: CPT

## 2020-10-27 PROCEDURE — 63600175 PHARM REV CODE 636 W HCPCS: Performed by: EMERGENCY MEDICINE

## 2020-10-27 PROCEDURE — 93010 ELECTROCARDIOGRAM REPORT: CPT | Mod: 76,,, | Performed by: INTERNAL MEDICINE

## 2020-10-27 PROCEDURE — 85025 COMPLETE CBC W/AUTO DIFF WBC: CPT

## 2020-10-27 PROCEDURE — 93010 EKG 12-LEAD: ICD-10-PCS | Mod: 76,,, | Performed by: INTERNAL MEDICINE

## 2020-10-27 PROCEDURE — C9113 INJ PANTOPRAZOLE SODIUM, VIA: HCPCS | Performed by: EMERGENCY MEDICINE

## 2020-10-27 RX ORDER — HALOPERIDOL 5 MG/ML
5 INJECTION INTRAMUSCULAR
Status: COMPLETED | OUTPATIENT
Start: 2020-10-27 | End: 2020-10-27

## 2020-10-27 RX ORDER — MAGNESIUM SULFATE 1 G/100ML
1 INJECTION INTRAVENOUS
Status: COMPLETED | OUTPATIENT
Start: 2020-10-27 | End: 2020-10-27

## 2020-10-27 RX ORDER — POTASSIUM CHLORIDE 1.5 G/1.58G
40 POWDER, FOR SOLUTION ORAL
Status: COMPLETED | OUTPATIENT
Start: 2020-10-27 | End: 2020-10-27

## 2020-10-27 RX ORDER — POTASSIUM CHLORIDE 20 MEQ/15ML
40 SOLUTION ORAL 2 TIMES DAILY
Qty: 120 ML | Refills: 0 | Status: ON HOLD | OUTPATIENT
Start: 2020-10-27 | End: 2020-10-31 | Stop reason: HOSPADM

## 2020-10-27 RX ORDER — SODIUM CHLORIDE AND POTASSIUM CHLORIDE 150; 900 MG/100ML; MG/100ML
1000 INJECTION, SOLUTION INTRAVENOUS
Status: COMPLETED | OUTPATIENT
Start: 2020-10-27 | End: 2020-10-27

## 2020-10-27 RX ORDER — PANTOPRAZOLE SODIUM 40 MG/10ML
40 INJECTION, POWDER, LYOPHILIZED, FOR SOLUTION INTRAVENOUS
Status: COMPLETED | OUTPATIENT
Start: 2020-10-27 | End: 2020-10-27

## 2020-10-27 RX ORDER — ONDANSETRON 4 MG/1
4 TABLET, FILM COATED ORAL EVERY 6 HOURS
Qty: 12 TABLET | Refills: 0 | Status: ON HOLD | OUTPATIENT
Start: 2020-10-27 | End: 2023-10-11 | Stop reason: HOSPADM

## 2020-10-27 RX ORDER — SODIUM CHLORIDE 9 MG/ML
1000 INJECTION, SOLUTION INTRAVENOUS
Status: COMPLETED | OUTPATIENT
Start: 2020-10-27 | End: 2020-10-27

## 2020-10-27 RX ADMIN — MAGNESIUM SULFATE 1 G: 1 INJECTION INTRAVENOUS at 02:10

## 2020-10-27 RX ADMIN — POTASSIUM CHLORIDE 40 MEQ: 1.5 POWDER, FOR SOLUTION ORAL at 06:10

## 2020-10-27 RX ADMIN — SODIUM CHLORIDE 1000 ML: 0.9 INJECTION, SOLUTION INTRAVENOUS at 02:10

## 2020-10-27 RX ADMIN — PANTOPRAZOLE SODIUM 40 MG: 40 INJECTION, POWDER, LYOPHILIZED, FOR SOLUTION INTRAVENOUS at 02:10

## 2020-10-27 RX ADMIN — SODIUM CHLORIDE AND POTASSIUM CHLORIDE 1000 ML: 9; 1.49 INJECTION, SOLUTION INTRAVENOUS at 04:10

## 2020-10-27 RX ADMIN — HALOPERIDOL LACTATE 5 MG: 5 INJECTION, SOLUTION INTRAMUSCULAR at 02:10

## 2020-10-27 NOTE — ED PROVIDER NOTES
Encounter Date: 10/26/2020    SCRIBE #1 NOTE: I, Js Brambila, am scribing for, and in the presence of,  Samia Jackson MD. I have scribed the following portions of the note - Other sections scribed: HPI, ROS, PE, MDM.       History     Chief Complaint   Patient presents with    Vomiting     Pt c/o nausea and vomiting with mild abdominal pain x3 days. Pt denies any diarrhea.      This is a 45-year-old male with a PMHx of GERD, Gastritis, and Hypertension who presents to the ED complaining of severe generalized intermittent abdominal pain that onset saturday. Associated symptoms include nausea and vomiting. No alleviating or worsening factors. No prior treatment.  Further history limited due to active emesis.    The history is provided by the patient. No  was used.     Review of patient's allergies indicates:  No Known Allergies  Past Medical History:   Diagnosis Date    Addiction to drug     Gastritis     GERD (gastroesophageal reflux disease)     Hallucination     Hypertension     Renal disorder     Sleep difficulties     Substance abuse      Past Surgical History:   Procedure Laterality Date    LEG SURGERY Right     NO PAST SURGERIES  11/21/2019     Family History   Problem Relation Age of Onset    Arthritis Mother     Hearing loss Father     Crohn's disease Sister      Social History     Tobacco Use    Smoking status: Former Smoker     Packs/day: 0.25     Years: 3.00     Pack years: 0.75    Smokeless tobacco: Never Used   Substance Use Topics    Alcohol use: Not Currently    Drug use: Yes     Comment: mojo daily     Review of Systems   Unable to perform ROS: Other (Active emesis)   Gastrointestinal: Positive for abdominal pain, nausea and vomiting.       Physical Exam     Initial Vitals [10/26/20 2318]   BP Pulse Resp Temp SpO2   (!) 136/104 94 18 98.4 °F (36.9 °C) 96 %      MAP       --         Physical Exam    Nursing note and vitals reviewed.  Constitutional: He is not  diaphoretic. No distress.   HENT:   Head: Normocephalic and atraumatic.   Eyes: Conjunctivae and EOM are normal. Pupils are equal, round, and reactive to light. No scleral icterus.   Neck: Normal range of motion. Neck supple. No JVD present.   Cardiovascular: Normal rate, regular rhythm and intact distal pulses.   Pulmonary/Chest: Breath sounds normal. No stridor. No respiratory distress.   Abdominal: Soft. Bowel sounds are normal. He exhibits no distension. There is abdominal tenderness (generalized). There is no rebound and no guarding.   Musculoskeletal: Normal range of motion. No tenderness or edema.   Neurological: He is alert. He has normal strength. No cranial nerve deficit or sensory deficit. GCS score is 15. GCS eye subscore is 4. GCS verbal subscore is 5. GCS motor subscore is 6.   Skin: Skin is warm and dry. No rash noted.         ED Course   Procedures  Labs Reviewed   COMPREHENSIVE METABOLIC PANEL - Abnormal; Notable for the following components:       Result Value    Potassium 3.1 (*)     Glucose 137 (*)     BUN 46 (*)     Creatinine 3.0 (*)     Total Protein 9.5 (*)     eGFR if  28 (*)     eGFR if non  24 (*)     All other components within normal limits   URINALYSIS, REFLEX TO URINE CULTURE - Abnormal; Notable for the following components:    Appearance, UA Hazy (*)     Protein, UA 2+ (*)     Bilirubin (UA) 1+ (*)     Occult Blood UA 2+ (*)     Urobilinogen, UA 4.0-6.0 (*)     All other components within normal limits    Narrative:     Specimen Source->Urine   DRUG SCREEN PANEL, URINE EMERGENCY - Abnormal; Notable for the following components:    Creatinine, Urine 400.0 (*)     All other components within normal limits    Narrative:     Specimen Source->Urine   URINALYSIS MICROSCOPIC - Abnormal; Notable for the following components:    RBC, UA 20 (*)     Hyaline Casts, UA 35 (*)     Granular Casts, UA 2 (*)     All other components within normal limits    Narrative:      Specimen Source->Urine   BASIC METABOLIC PANEL - Abnormal; Notable for the following components:    Potassium 2.7 (*)     Glucose 125 (*)     BUN 45 (*)     Creatinine 2.5 (*)     eGFR if  35 (*)     eGFR if non  30 (*)     All other components within normal limits    Narrative:     Potassium  critical result(s) called and verbal readback obtained   from Shaniqua  by JAMAL 10/27/2020 05:52   SARS-COV-2 RDRP GENE - Normal   CBC W/ AUTO DIFFERENTIAL   LIPASE   CK   MAGNESIUM   CK   MAGNESIUM     EKG Readings: (Independently Interpreted)   Initial Reading: No STEMI. Rhythm: Normal Sinus Rhythm. Heart Rate: 71. Ectopy: No Ectopy. Axis: Normal. Other Findings: Prolonged QT Interval. Clinical Impression: Normal Sinus Rhythm   Nonspecific ST changes     ECG Results          EKG 12-lead (Final result)  Result time 10/27/20 17:16:34    Final result by Interface, Lab In [facility] (10/27/20 17:16:34)                 Narrative:    Test Reason : R11.10,    Vent. Rate : 076 BPM     Atrial Rate : 076 BPM     P-R Int : 128 ms          QRS Dur : 096 ms      QT Int : 518 ms       P-R-T Axes : 082 082 074 degrees     QTc Int : 582 ms    Normal sinus rhythm with sinus arrhythmia  Right atrial enlargement  Moderate voltage criteria for LVH, may be normal variant  Prolonged QT  Abnormal ECG  When compared with ECG of 27-OCT-2020 02:18,  No significant change was found  Confirmed by Alen Gavin MD ([id number]) on 10/27/2020 5:16:24 PM    Referred By: [email or web address]   SELF           Confirmed By:Alen Gavin MD                             EKG 12-lead (Final result)  Result time 10/27/20 17:16:32    Final result by Interface, Lab In [facility] (10/27/20 17:16:32)                 Narrative:    Test Reason : E87.0,    Vent. Rate : 071 BPM     Atrial Rate : 071 BPM     P-R Int : 114 ms          QRS Dur : 100 ms      QT Int : 494 ms       P-R-T Axes : 078 083 075 degrees     QTc Int : 536 ms    Normal sinus  rhythm  Nonspecific ST and T wave abnormality  Prolonged QT  Abnormal ECG  When compared with ECG of 16-SEP-2020 10:21,  Significant changes have occurred  Confirmed by Alen Gavin MD ([id number]) on 10/27/2020 5:16:15 PM    Referred By: [email or web address]   SELF           Confirmed By:Alen Gavin MD                            Imaging Results          CT Renal Stone Study ABD Pelvis WO (Final result)  Result time 10/27/20 05:05:37    Final result by Daphney Michael MD (10/27/20 05:05:37)                 Impression:      1. No acute abnormality identified on today's exam.  Specifically no evidence of nephrolithiasis or hydronephrosis.  2. Moderate volume of fecal material in the colon which can be seen with constipation.      Electronically signed by: Daphney Michael MD  Date:    10/27/2020  Time:    05:05             Narrative:    EXAMINATION:  CT RENAL STONE STUDY ABD PELVIS WO    CLINICAL HISTORY:  Flank pain, kidney stone suspected;    TECHNIQUE:  Low dose axial images, sagittal and coronal reformations were obtained from the lung bases to the pubic symphysis.  Contrast was not administered.    COMPARISON:  Renal stone study 12/20/2017    FINDINGS:  The visualized lung bases are free of pleural fluid or focal consolidation.  The visualized portions of the heart and pericardium are within normal limits.    Please note evaluation of solid organ parenchyma is limited due to lack of IV contrast.  The liver, spleen, pancreas and adrenal glands demonstrate an unremarkable noncontrast CT appearance.  Incidentally noted 2.8 cm splenule present the left upper quadrant, unchanged.  No evidence of calcified stone in the gallbladder lumen.  No significant intra or extrahepatic biliary ductal dilatation.    The kidneys are normal in size and location with a grossly unremarkable noncontrast CT appearance.  There is no evidence of hydronephrosis.  No calcifications about the anticipated course of the ureters.  There are several small  calcifications in the pelvis in keeping with phleboliths, unchanged from prior study.  The urinary bladder is unremarkable.  The prostate contains small dystrophic calcifications but is otherwise unremarkable.    The abdominal aorta is nonaneurysmal.  The visualized loops of large and small bowel demonstrate no evidence of obstruction or inflammatory change.  There is an moderate volume of fecal material throughout the colon which can be seen with constipation.  No right lower quadrant inflammatory change.  There is no ascites, free intraperitoneal air or portal venous gas.    The visualized osseous structures demonstrate degenerative endplate change at the L4-L5 level, unchanged.  No acute osseous abnormality identified.                                 Medical Decision Making:   History:   Old Medical Records: I decided to obtain old medical records.  Differential Diagnosis:   Includes but not limited to: GERD, gastritis, gastroenteritis, hyperemesis, pancreatitis, drug withdrawal  Independently Interpreted Test(s):   I have ordered and independently interpreted EKG Reading(s) - see prior notes  Clinical Tests:   Lab Tests: Ordered and Reviewed  Radiological Study: Ordered and Reviewed  Medical Tests: Ordered and Reviewed  ED Management:  Patient is afebrile and not toxic appearing at time history and physical.  He complains of generalized abdominal pain and emesis.  He has generalized abdominal tenderness but does not have an acute surgical abdomen.  Labs without leukocytosis or granulocytosis to suggest significant infectious process.  Patient has ADINA and mild hypokalemia.  UA does not suggest UTI but does have occult blood.  CPK and lipase are within normal ranges.  Patient given IV hydration and potassium supplementation as well as Haldol for abdominal pain and emesis.  Patient has had resolution of emesis with Haldol.  Patient to be sent for CT scan to rule out renal stone or intra-abdominal abnormality.   Repeat BMP to evaluate for improvement renal function.    Patient care transferred to Dr. Mcgovern who will follow up imaging and labs and determine ultimate disposition of the patient.  This chart was completed using dictation software, as a result there may be some transcription errors.   Samia Jackson 10/27/2020 4:01 AM         Update:  K returned at 2.7 so patient given additional 40 mEq which he tolerated w/o complication.  BUN and Cr are chronic secondary to CKD.  Pt offered OBS for repletion of K but stated he felt much better and wished to be discharged.  Pt tolerated PO intake w/o difficulty and given Rx for PO repletion of  K.  Pt discharged and counseled on the need to return to the nearest emergency room if they experience any other concerning symptoms.  Pt counseled to F/U outpatient with a PCP over the next week.    Osman Mcgovern MD                Scribe Attestation:   Scribe #1: I performed the above scribed service and the documentation accurately describes the services I performed. I attest to the accuracy of the note.                      Clinical Impression:       ICD-10-CM ICD-9-CM   1. Non-intractable vomiting with nausea, unspecified vomiting type  R11.2 787.01   2. Emesis  R11.10 787.03   3. Epigastric pain  R10.13 789.06   4. Hypernatremia  E87.0 276.0                          ED Disposition Condition    Discharge Stable        ED Prescriptions     Medication Sig Dispense Start Date End Date Auth. Provider    ondansetron (ZOFRAN) 4 MG tablet Take 1 tablet (4 mg total) by mouth every 6 (six) hours. 12 tablet 10/27/2020  Osman Mcgovern MD    potassium chloride 10% (KAYCIEL) 20 mEq/15 mL oral solution Take 30 mLs (40 mEq total) by mouth 2 (two) times daily. for 2 days 120 mL 10/27/2020 10/29/2020 Osman Mcgovern MD        Follow-up Information     Follow up With Specialties Details Why Contact Info    [facility]  Schedule an appointment as soon as possible for a  visit in 1 week to follow-up with a primary care physician on today's visit [address]  [place] [address]  [phone]      [facility] Emergency [facility] Go to  As needed, If symptoms worsen [address] [place] [address] [phone]                      I, Osman Mcgovern , personally performed the services described in this documentation. All medical record entries made by the scribe were at my direction and in my presence.  I have reviewed the chart and agree that the record reflects my personal performance and is accurate and complete.                 Osman Mcgovern MD  10/28/20 [id number]

## 2020-10-27 NOTE — ED NOTES
Pt had disconnected his K+ and was letting it drip onto the floor and replaced it with empty bag of NS, pt denies changing the bags but states he got all he needs.  MD aware will order PO K+.  Pt is no longer tearful or sad, is talking in normal tone of voice.

## 2020-10-27 NOTE — ED NOTES
"Pt is crying tearfully in room, when asked why he states, "because I messed up your work", Pt has all leads off and tangled in IV cord.  Pt assured that it was ok and something we can fix, pt asks for hug from nurse and apologizes again.  Pt is seemingly anxious and can not sit still, MD aware  "

## 2020-10-27 NOTE — ED TRIAGE NOTES
Pt c/o nausea and vomiting with mild abdominal pain x3 days.  Epigastric/above umbilical area pain that started after eating some fried fish. Reports a stabbing pain that comes and goes. Pt denies any diarrhea. Pt also with complaints of jock itch/rash to groin area for sometime. Hx of gastritis. Cant keep anything down.

## 2020-10-27 NOTE — ED NOTES
MD at bedside to discuss results, pt states he is feeling much better and would like to go home as opposed to staying.  Pt agrees to take PO K+.

## 2020-10-27 NOTE — FIRST PROVIDER EVALUATION
Emergency Department TeleTriage Encounter Note      CHIEF COMPLAINT    Chief Complaint   Patient presents with    Vomiting     Pt c/o nausea and vomiting with mild abdominal pain x3 days. Pt denies any diarrhea.        VITAL SIGNS   Initial Vitals [10/26/20 2318]   BP Pulse Resp Temp SpO2   (!) 136/104 94 18 98.4 °F (36.9 °C) 96 %      MAP       --            ALLERGIES    Review of patient's allergies indicates:  No Known Allergies    PROVIDER TRIAGE NOTE  This is a teletriage evaluation of a 45 y.o. male presenting to the ED with c/o abdominal pain with nausea x3 days. Initial orders will be placed and care will be transferred to an alternate provider when patient is roomed for a full evaluation. Any additional orders and the final disposition will be determined by that provider.         ORDERS  Labs Reviewed   CBC W/ AUTO DIFFERENTIAL   COMPREHENSIVE METABOLIC PANEL   LIPASE   URINALYSIS, REFLEX TO URINE CULTURE       ED Orders (720h ago, onward)    Start Ordered     Status Ordering Provider    10/27/20 0033 10/27/20 0032  Vital signs  Every 2 hours      Ordered SHANT ESCOBEDO    10/27/20 0033 10/27/20 0032  Diet NPO  Diet effective now      Ordered ESCOBEDOSHANT    10/27/20 0033 10/27/20 0032  Insert peripheral IV  Once      Ordered ESCOBEDOSHANT RODRÍGUEZ    10/27/20 0033 10/27/20 0032  CBC W/ AUTO DIFFERENTIAL  STAT  Collect    Ordered SHANT ESCOBEDO    10/27/20 0033 10/27/20 0032  Comp. Metabolic Panel  STAT  Collect    Ordered SHANT ESCOBEDO    10/27/20 0033 10/27/20 0032  Lipase  STAT  Collect    Ordered ESCOBEDOSHANT RODRÍGUEZ    10/27/20 0033 10/27/20 0032  Urinalysis, Reflex to Urine Culture Urine, Clean Catch  STAT      Ordered SHANT ESCOBEDO            Virtual Visit Note: The provider triage portion of this emergency department evaluation and documentation was performed via [employer], a HIPAA-compliant telemedicine application, in concert with a tele-presenter in the room. A face to face patient evaluation with one  of my colleagues will occur once the patient is placed in an emergency department room.      DISCLAIMER: This note was prepared with [employer] voice recognition transcription software. Garbled syntax, mangled pronouns, and other bizarre constructions may be attributed to that software system.

## 2020-10-29 ENCOUNTER — HOSPITAL ENCOUNTER (OUTPATIENT)
Facility: HOSPITAL | Age: 45
Discharge: HOME | End: 2020-10-31
Attending: EMERGENCY MEDICINE | Admitting: EMERGENCY MEDICINE
Payer: OTHER GOVERNMENT

## 2020-10-29 DIAGNOSIS — K76.9 LIVER LESION: ICD-10-CM

## 2020-10-29 DIAGNOSIS — R07.9 CHEST PAIN: ICD-10-CM

## 2020-10-29 DIAGNOSIS — R11.2 NON-INTRACTABLE VOMITING WITH NAUSEA, UNSPECIFIED VOMITING TYPE: ICD-10-CM

## 2020-10-29 DIAGNOSIS — N17.9 AKI (ACUTE KIDNEY INJURY): ICD-10-CM

## 2020-10-29 DIAGNOSIS — R11.2 INTRACTABLE VOMITING WITH NAUSEA: ICD-10-CM

## 2020-10-29 DIAGNOSIS — E87.6 HYPOKALEMIA: ICD-10-CM

## 2020-10-29 DIAGNOSIS — E86.0 DEHYDRATION: Primary | ICD-10-CM

## 2020-10-29 DIAGNOSIS — R10.13 EPIGASTRIC PAIN: ICD-10-CM

## 2020-10-29 DIAGNOSIS — R11.2 NAUSEA & VOMITING: ICD-10-CM

## 2020-10-29 DIAGNOSIS — R79.89 ELEVATED SERUM CREATININE: ICD-10-CM

## 2020-10-29 LAB
ALBUMIN SERPL BCP-MCNC: 4.9 G/DL (ref 3.5–5.2)
ALP SERPL-CCNC: 124 U/L (ref 55–135)
ALT SERPL W/O P-5'-P-CCNC: 22 U/L (ref 10–44)
ANION GAP SERPL CALC-SCNC: 17 MMOL/L (ref 8–16)
AST SERPL-CCNC: 33 U/L (ref 10–40)
BACTERIA #/AREA URNS HPF: ABNORMAL /HPF
BASOPHILS # BLD AUTO: 0.03 K/UL (ref 0–0.2)
BASOPHILS NFR BLD: 0.4 % (ref 0–1.9)
BILIRUB SERPL-MCNC: 0.9 MG/DL (ref 0.1–1)
BILIRUB UR QL STRIP: NEGATIVE
BUN SERPL-MCNC: 59 MG/DL (ref 6–20)
CALCIUM SERPL-MCNC: 9.8 MG/DL (ref 8.7–10.5)
CHLORIDE SERPL-SCNC: 85 MMOL/L (ref 95–110)
CLARITY UR: ABNORMAL
CO2 SERPL-SCNC: 29 MMOL/L (ref 23–29)
COLOR UR: YELLOW
CREAT SERPL-MCNC: 3.3 MG/DL (ref 0.5–1.4)
CTP QC/QA: YES
DIFFERENTIAL METHOD: ABNORMAL
EOSINOPHIL # BLD AUTO: 0 K/UL (ref 0–0.5)
EOSINOPHIL NFR BLD: 0.4 % (ref 0–8)
ERYTHROCYTE [DISTWIDTH] IN BLOOD BY AUTOMATED COUNT: 12.9 % (ref 11.5–14.5)
EST. GFR  (AFRICAN AMERICAN): 25 ML/MIN/1.73 M^2
EST. GFR  (NON AFRICAN AMERICAN): 22 ML/MIN/1.73 M^2
GLUCOSE SERPL-MCNC: 133 MG/DL (ref 70–110)
GLUCOSE UR QL STRIP: NEGATIVE
HCT VFR BLD AUTO: 49 % (ref 40–54)
HGB BLD-MCNC: 17.2 G/DL (ref 14–18)
HGB UR QL STRIP: ABNORMAL
HYALINE CASTS #/AREA URNS LPF: 3 /LPF
IMM GRANULOCYTES # BLD AUTO: 0.03 K/UL (ref 0–0.04)
IMM GRANULOCYTES NFR BLD AUTO: 0.4 % (ref 0–0.5)
KETONES UR QL STRIP: NEGATIVE
LEUKOCYTE ESTERASE UR QL STRIP: NEGATIVE
LIPASE SERPL-CCNC: 25 U/L (ref 4–60)
LYMPHOCYTES # BLD AUTO: 2.6 K/UL (ref 1–4.8)
LYMPHOCYTES NFR BLD: 30.3 % (ref 18–48)
MCH RBC QN AUTO: 30.9 PG (ref 27–31)
MCHC RBC AUTO-ENTMCNC: 35.1 G/DL (ref 32–36)
MCV RBC AUTO: 88 FL (ref 82–98)
MICROSCOPIC COMMENT: ABNORMAL
MONOCYTES # BLD AUTO: 0.8 K/UL (ref 0.3–1)
MONOCYTES NFR BLD: 9.7 % (ref 4–15)
NEUTROPHILS # BLD AUTO: 5 K/UL (ref 1.8–7.7)
NEUTROPHILS NFR BLD: 58.8 % (ref 38–73)
NITRITE UR QL STRIP: NEGATIVE
NRBC BLD-RTO: 0 /100 WBC
PH UR STRIP: 5 [PH] (ref 5–8)
PLATELET # BLD AUTO: 366 K/UL (ref 150–350)
PMV BLD AUTO: 9.8 FL (ref 9.2–12.9)
POTASSIUM SERPL-SCNC: 3.3 MMOL/L (ref 3.5–5.1)
PROT SERPL-MCNC: 9.1 G/DL (ref 6–8.4)
PROT UR QL STRIP: ABNORMAL
RBC # BLD AUTO: 5.56 M/UL (ref 4.6–6.2)
RBC #/AREA URNS HPF: 5 /HPF (ref 0–4)
SARS-COV-2 RDRP RESP QL NAA+PROBE: NEGATIVE
SODIUM SERPL-SCNC: 131 MMOL/L (ref 136–145)
SP GR UR STRIP: 1.02 (ref 1–1.03)
URN SPEC COLLECT METH UR: ABNORMAL
UROBILINOGEN UR STRIP-ACNC: ABNORMAL EU/DL
WBC # BLD AUTO: 8.45 K/UL (ref 3.9–12.7)
WBC #/AREA URNS HPF: 2 /HPF (ref 0–5)

## 2020-10-29 PROCEDURE — 96366 THER/PROPH/DIAG IV INF ADDON: CPT

## 2020-10-29 PROCEDURE — 96361 HYDRATE IV INFUSION ADD-ON: CPT

## 2020-10-29 PROCEDURE — 83690 ASSAY OF LIPASE: CPT

## 2020-10-29 PROCEDURE — 85025 COMPLETE CBC W/AUTO DIFF WBC: CPT

## 2020-10-29 PROCEDURE — 93010 EKG 12-LEAD: ICD-10-PCS | Mod: ,,, | Performed by: INTERNAL MEDICINE

## 2020-10-29 PROCEDURE — 63600175 PHARM REV CODE 636 W HCPCS: Performed by: PHYSICIAN ASSISTANT

## 2020-10-29 PROCEDURE — U0002 COVID-19 LAB TEST NON-CDC: HCPCS | Performed by: PHYSICIAN ASSISTANT

## 2020-10-29 PROCEDURE — 80307 DRUG TEST PRSMV CHEM ANLYZR: CPT

## 2020-10-29 PROCEDURE — 93005 ELECTROCARDIOGRAM TRACING: CPT

## 2020-10-29 PROCEDURE — 25000003 PHARM REV CODE 250: Performed by: PHYSICIAN ASSISTANT

## 2020-10-29 PROCEDURE — G0378 HOSPITAL OBSERVATION PER HR: HCPCS

## 2020-10-29 PROCEDURE — C9113 INJ PANTOPRAZOLE SODIUM, VIA: HCPCS | Performed by: PHYSICIAN ASSISTANT

## 2020-10-29 PROCEDURE — 96365 THER/PROPH/DIAG IV INF INIT: CPT

## 2020-10-29 PROCEDURE — 80053 COMPREHEN METABOLIC PANEL: CPT

## 2020-10-29 PROCEDURE — 81000 URINALYSIS NONAUTO W/SCOPE: CPT | Mod: 59

## 2020-10-29 PROCEDURE — 99285 EMERGENCY DEPT VISIT HI MDM: CPT | Mod: 25

## 2020-10-29 PROCEDURE — 93010 ELECTROCARDIOGRAM REPORT: CPT | Mod: ,,, | Performed by: INTERNAL MEDICINE

## 2020-10-29 PROCEDURE — 96375 TX/PRO/DX INJ NEW DRUG ADDON: CPT

## 2020-10-29 RX ORDER — MORPHINE SULFATE 4 MG/ML
4 INJECTION, SOLUTION INTRAMUSCULAR; INTRAVENOUS
Status: COMPLETED | OUTPATIENT
Start: 2020-10-29 | End: 2020-10-29

## 2020-10-29 RX ORDER — POTASSIUM CHLORIDE 20 MEQ/1
60 TABLET, EXTENDED RELEASE ORAL
Status: COMPLETED | OUTPATIENT
Start: 2020-10-29 | End: 2020-10-29

## 2020-10-29 RX ORDER — KETOROLAC TROMETHAMINE 30 MG/ML
15 INJECTION, SOLUTION INTRAMUSCULAR; INTRAVENOUS
Status: DISCONTINUED | OUTPATIENT
Start: 2020-10-29 | End: 2020-10-29

## 2020-10-29 RX ORDER — ONDANSETRON 2 MG/ML
4 INJECTION INTRAMUSCULAR; INTRAVENOUS
Status: COMPLETED | OUTPATIENT
Start: 2020-10-29 | End: 2020-10-29

## 2020-10-29 RX ORDER — PANTOPRAZOLE SODIUM 40 MG/10ML
40 INJECTION, POWDER, LYOPHILIZED, FOR SOLUTION INTRAVENOUS
Status: COMPLETED | OUTPATIENT
Start: 2020-10-29 | End: 2020-10-29

## 2020-10-29 RX ORDER — AMLODIPINE BESYLATE 5 MG/1
10 TABLET ORAL
Status: COMPLETED | OUTPATIENT
Start: 2020-10-29 | End: 2020-10-29

## 2020-10-29 RX ADMIN — ONDANSETRON 4 MG: 2 INJECTION INTRAMUSCULAR; INTRAVENOUS at 06:10

## 2020-10-29 RX ADMIN — MORPHINE SULFATE 4 MG: 4 INJECTION INTRAVENOUS at 08:10

## 2020-10-29 RX ADMIN — POTASSIUM CHLORIDE 60 MEQ: 1500 TABLET, EXTENDED RELEASE ORAL at 10:10

## 2020-10-29 RX ADMIN — PANTOPRAZOLE SODIUM 40 MG: 40 INJECTION, POWDER, LYOPHILIZED, FOR SOLUTION INTRAVENOUS at 06:10

## 2020-10-29 RX ADMIN — SODIUM CHLORIDE 1000 ML: 0.9 INJECTION, SOLUTION INTRAVENOUS at 08:10

## 2020-10-29 RX ADMIN — PROMETHAZINE HYDROCHLORIDE 12.5 MG: 25 INJECTION INTRAMUSCULAR; INTRAVENOUS at 10:10

## 2020-10-29 RX ADMIN — AMLODIPINE BESYLATE 10 MG: 5 TABLET ORAL at 11:10

## 2020-10-29 RX ADMIN — SODIUM CHLORIDE 1000 ML: 0.9 INJECTION, SOLUTION INTRAVENOUS at 06:10

## 2020-10-29 RX ADMIN — PROMETHAZINE HYDROCHLORIDE 12.5 MG: 25 INJECTION INTRAMUSCULAR; INTRAVENOUS at 08:10

## 2020-10-29 NOTE — ED PROVIDER NOTES
Encounter Date: 10/29/2020    SCRIBE #1 NOTE: I, Lacy Mariano, am scribing for, and in the presence of,  Lin Hicks PA-C. I have scribed the following portions of the note - Other sections scribed: HPI, ROS, PE.       History     Chief Complaint   Patient presents with    Nausea     Nausea/vomiting x 1 week, states he can't keep anything down      CC: Abdominal Pain    HPI:  This is a 45 y.o. male with a history of HTN, GERD and gastritis who presents to the ED with a chief complaint of epigastric abdominal pain that has been occurring for seven days. The patient rates the pain as 5/10 and states that it is a constant ache. Pain worse after drinking. The patient states that he presented to the ED two days ago with the same symptoms, and he returned today because his symptoms did not improve. He did not fill his medications as prescribed at that time. He reports he has had multiple similar episodes of abdominal pain and vomiting in the past that usually occur after smoking Mojo. He states he last smoked 3 days ago. He reports he is unable to tolerate PO and has had decreased urine output. He had single episode of chest pain lasting 10-15 minutes prior to arrival that occurred after vomiting. He denies hematemesis, persisting CP, SOB, dizziness, lightheadedness. He is noncompliant with HTN meds for the past year. Denies diarrhea, fever, dysuria, hematuria, urinary urgency or frequency. He reports taking Aleve six hours ago with no relief. These symptoms are similar to previous flare-ups of gastritis.    The history is provided by the patient.     Review of patient's allergies indicates:  No Known Allergies  Past Medical History:   Diagnosis Date    Addiction to drug     Gastritis     GERD (gastroesophageal reflux disease)     Hallucination     Hypertension     Renal disorder     Sleep difficulties     Substance abuse      Past Surgical History:   Procedure Laterality Date    LEG SURGERY Right      NO PAST SURGERIES  11/21/2019     Family History   Problem Relation Age of Onset    Arthritis Mother     Hearing loss Father     Crohn's disease Sister      Social History     Tobacco Use    Smoking status: Former Smoker     Packs/day: 0.25     Years: 3.00     Pack years: 0.75    Smokeless tobacco: Never Used   Substance Use Topics    Alcohol use: Yes     Comment: socially    Drug use: Yes     Comment: mojo daily     Review of Systems   Constitutional: Negative for chills and fever.   HENT: Negative for trouble swallowing.    Eyes: Negative for redness.   Respiratory: Negative for shortness of breath and stridor.    Cardiovascular: Positive for chest pain (following vomiting).   Gastrointestinal: Positive for abdominal pain (epigastric), nausea and vomiting. Negative for diarrhea.   Genitourinary: Negative for difficulty urinating, dysuria, frequency, hematuria and urgency.   Musculoskeletal: Positive for back pain (abdominal pain radiates to middle back). Negative for neck pain.   Neurological: Negative for dizziness, speech difficulty, weakness, light-headedness and numbness.   Psychiatric/Behavioral: Negative for confusion.       Physical Exam     Initial Vitals [10/29/20 1520]   BP Pulse Resp Temp SpO2   133/74 82 18 98.2 °F (36.8 °C) 98 %      MAP       --         Physical Exam    Nursing note and vitals reviewed.  Constitutional: He appears well-developed and well-nourished. He is not diaphoretic. No distress.   Patient appears comfortable. He is not actively vomiting.   HENT:   Head: Normocephalic and atraumatic.   Mouth/Throat: Oropharynx is clear and moist.   Eyes: Conjunctivae and EOM are normal.   Neck: Normal range of motion. Neck supple.   Cardiovascular: Normal rate, regular rhythm and normal heart sounds. Exam reveals no gallop and no friction rub.    No murmur heard.  Pulmonary/Chest: Breath sounds normal. He has no wheezes. He has no rhonchi. He has no rales.   No chest wall tenderness.    Abdominal: Soft. He exhibits no mass. There is abdominal tenderness. There is no rigidity, no rebound and no guarding.   Mild epigastric tenderness   Musculoskeletal: Normal range of motion. No edema.      Thoracic back: He exhibits tenderness.      Comments: Mild thoracic paraspinal tenderness.   Lymphadenopathy:     He has no cervical adenopathy.   Neurological: He is alert and oriented to person, place, and time. He has normal strength.   Skin: Skin is warm and dry. No rash and no abscess noted.         ED Course   Procedures  Labs Reviewed   CBC W/ AUTO DIFFERENTIAL - Abnormal; Notable for the following components:       Result Value    Platelets 366 (*)     All other components within normal limits   COMPREHENSIVE METABOLIC PANEL - Abnormal; Notable for the following components:    Sodium 131 (*)     Potassium 3.3 (*)     Chloride 85 (*)     Glucose 133 (*)     BUN 59 (*)     Creatinine 3.3 (*)     Total Protein 9.1 (*)     Anion Gap 17 (*)     eGFR if  25 (*)     eGFR if non  22 (*)     All other components within normal limits   URINALYSIS, REFLEX TO URINE CULTURE - Abnormal; Notable for the following components:    Appearance, UA Hazy (*)     Protein, UA 2+ (*)     Occult Blood UA 2+ (*)     Urobilinogen, UA 2.0-3.0 (*)     All other components within normal limits    Narrative:     Specimen Source->Urine   URINALYSIS MICROSCOPIC - Abnormal; Notable for the following components:    RBC, UA 5 (*)     Bacteria Moderate (*)     Hyaline Casts, UA 3 (*)     All other components within normal limits    Narrative:     Specimen Source->Urine   LIPASE   SARS-COV-2 RDRP GENE     EKG Readings: (Independently Interpreted)   Initial Reading: No STEMI. Rhythm: Normal Sinus Rhythm. Heart Rate: 95. Ectopy: No Ectopy. Axis: Right Axis Deviation. Other Findings: Prolonged QT Interval. Other Impression: qtc 578       Imaging Results          X-Ray Chest AP Portable (Final result)  Result time  10/29/20 19:01:12    Final result by Nathan Steven MD (10/29/20 19:01:12)                 Impression:      No acute process.      Electronically signed by: Nathan Steven MD  Date:    10/29/2020  Time:    19:01             Narrative:    EXAMINATION:  XR CHEST AP PORTABLE    CLINICAL HISTORY:  Chest pain, unspecified    TECHNIQUE:  Single frontal view of the chest was performed.    COMPARISON:  09/16/2020.    FINDINGS:  The trachea is unremarkable.  The cardiomediastinal silhouette is within normal limits.  The hilar structures are unremarkable.  The hemidiaphragms are within normal limits.  There is no evidence of free air beneath the hemidiaphragms.  There are no pleural effusions.  There is no evidence of a pneumothorax.  There is no evidence of pneumomediastinum.  No airspace opacity is present.  The osseous structures are unremarkable.                                US Abdomen Limited (Final result)  Result time 10/29/20 18:43:05    Final result by Debra Sorenson MD (10/29/20 18:43:05)                 Impression:      Echogenic lesions within the liver.  Differential considerations include hemangiomas, metastases, and primary hepatic neoplasia.  No increase in size of a previously seen lesion in the right lobe of the liver.  Additional smaller lesion seen on the current exam in the right lobe of the liver.    This report was flagged in Epic as abnormal.  .      Electronically signed by: Debra Sorenson  Date:    10/29/2020  Time:    18:43             Narrative:    EXAMINATION:  ULTRASOUND ABDOMEN LIMITED    CLINICAL HISTORY:  epigastric pain;    TECHNIQUE:  Limited ultrasound of the right upper quadrant of the abdomen (including pancreas, liver, gallbladder, common bile duct, and spleen) was performed.    COMPARISON:  10/14/2019    FINDINGS:  Liver: Normal in size, measuring 16.4 cm. Homogeneous echotexture. There are echogenic lesions within the liver.  One measures 1.6 x 1.8 x 1.9 cm.  It is compared to a  "previously measured echogenic lesion at 2.0 x 1.9 x 2.1 cm.  Another measures 0.9 x 0.6 x 0.6 cm and was not visualized on the previous exam.    Gallbladder: No calculi.  No wall thickening, or pericholecystic fluid.  No sonographic Brambila's sign.    Biliary system: The common duct is not dilated, measuring 2.1 mm.  No intrahepatic ductal dilatation.    Spleen: The spleen is normal in size, measuring 9.7 x 3.9 cm.  A splenule is present.    Miscellaneous: Pancreas is obscured by overlying bowel gas.                                 Medical Decision Making:   Initial Assessment:   46 y/o male with HTN, GERD, gastritis and mojo use presenting for evaluation of 5/10 epigastric pain with associated nausea vomiting.  Patient reports similar episodes that occur after smoking synthetic marijuana.  He reports he is unable to tolerate p.o. and reports decreased urine output.  Was evaluated this facility 2 days ago.  Did not fill prescriptions.  States he is not feeling any better.  Patient did have single episode of 15 and 20 min episode of chest pain prior to arrival that resolved completely.  He denies hematemesis.  Considered but doubt booerrhaves or Juanita-Gutierrez tear. EKG without malignant arrhythmia or acute ischemia. CXR  Negative for PNA, PTX, mediastinal air/widening.     CBC without leukocytosis or significant anemia. Mild thrombocytosis.   BUN/Cr/GFR 59/3.3/25 from 46/3.0/28 2 days ago.   Na 131, K 3.3, Cl 85.     After IV fluids, protonix, and zofran pt walking around the room. Whining/tearful and states "I'm in pain." Girlfriend states "y'all gave him zofran? That don't ever work for him do y'all have something else?"     Phenergan and morphine ordered. Additional L of NS ordered. /105. He states he is in pain.      1920- pt is groaning and retching in the room.     US shows echogenic lesions of liver with ddx hemangiomas, metastasis, primary hepatic neoplasia.   No acute process. Discussed this with pt and " informed him that I would provided him with clinics that he can follow up with even though he does not have insurance currently.     Still having abdominal pain and nausea. Additional phenergan ordered. Discussed pt with [facility] NP. Pt placed in observation for dehydration and ADINA     Discussed pt with Dr. Cobian who also evluated pt face to face and she agrees with a ssessment and plan.             Scribe Attestation:   Scribe #1: I performed the above scribed service and the documentation accurately describes the services I performed. I attest to the accuracy of the note.                      Clinical Impression:       ICD-10-CM ICD-9-CM   1. Dehydration  E86.0 276.51   2. Epigastric pain  R10.13 789.06   3. Chest pain  R07.9 786.50   4. Non-intractable vomiting with nausea, unspecified vomiting type  R11.2 787.01   5. ADINA (acute kidney injury)  N17.9 584.9   6. Elevated serum creatinine  R79.89 790.99   7. Nausea & vomiting  R11.2 787.01                          ED Disposition Condition    Observation            I, Lin Hicks PA-C  personally performed the services described in this documentation. All medical record entries made by the scribe were at my direction and in my presence. I have reviewed the chart and agree that the record reflects my personal performance and is accurate and complete.                 Lin Hicks PA-C  10/29/20 2248       Lin Hicks PA-C  10/29/20 9169

## 2020-10-29 NOTE — ED TRIAGE NOTES
Here x 2 days ago w/ dehydration, vomiting, abd pain.   Remains with abd pain to top center, non-radiating.  6/10.   Remains with vomiting.  Denies diarrhea or fever.

## 2020-10-29 NOTE — FIRST PROVIDER EVALUATION
Emergency Department TeleTriage Encounter Note      CHIEF COMPLAINT    Chief Complaint   Patient presents with    Nausea     Nausea/vomiting x 1 week, states he can't keep anything down        VITAL SIGNS   Initial Vitals [10/29/20 1520]   BP Pulse Resp Temp SpO2   133/74 82 18 98.2 °F (36.8 °C) 98 %      MAP       --            ALLERGIES    Review of patient's allergies indicates:  No Known Allergies    PROVIDER TRIAGE NOTE  This is a teletriage evaluation of a 45 y.o. male presenting to the ED with c/o n/v and upper abdominal pain. Seen here for similar with hypokalemia, ADINA, prolonged QT, and marijuana use. Initial orders will be placed and care will be transferred to an alternate provider when patient is roomed for a full evaluation. Any additional orders and the final disposition will be determined by that provider.         ORDERS  Labs Reviewed   CBC W/ AUTO DIFFERENTIAL   COMPREHENSIVE METABOLIC PANEL   LIPASE   URINALYSIS, REFLEX TO URINE CULTURE       ED Orders (720h ago, onward)    Start Ordered     Status Ordering Provider    10/29/20 1634 10/29/20 1633  Vital signs  Every 2 hours      Ordered EILEEN CALDERA    10/29/20 1634 10/29/20 1633  Diet NPO  Diet effective now      Ordered EILEEN CALDERA    10/29/20 1634 10/29/20 1633  Insert peripheral IV  Once      Ordered EILEEN CALDERA    10/29/20 1634 10/29/20 1633  CBC W/ AUTO DIFFERENTIAL  STAT  Collect    Ordered EILEEN CALDERA    10/29/20 1634 10/29/20 1633  Comp. Metabolic Panel  STAT  Collect    Ordered EILEEN CALDERA    10/29/20 1634 10/29/20 1633  Lipase  STAT  Collect    Ordered EILEEN CALDERA    10/29/20 1634 10/29/20 1633  Urinalysis, Reflex to Urine Culture Urine, Clean Catch  STAT      Ordered EILEEN CALDERA    10/29/20 1634 10/29/20 1633  EKG 12-lead  Once      Ordered EILEEN CALDERA            Virtual Visit Note: The provider triage portion of this emergency department evaluation and documentation was performed via  [employer], a HIPAA-compliant telemedicine application, in concert with a tele-presenter in the room. A face to face patient evaluation with one of my colleagues will occur once the patient is placed in an emergency department room.      DISCLAIMER: This note was prepared with [employer] voice recognition transcription software. Garbled syntax, mangled pronouns, and other bizarre constructions may be attributed to that software system.

## 2020-10-30 PROBLEM — K76.9 LIVER LESION: Status: ACTIVE | Noted: 2020-10-30

## 2020-10-30 PROBLEM — R77.8 ELEVATED TOTAL PROTEIN: Status: ACTIVE | Noted: 2020-10-30

## 2020-10-30 LAB
ALBUMIN SERPL BCP-MCNC: 4.1 G/DL (ref 3.5–5.2)
ALP SERPL-CCNC: 106 U/L (ref 55–135)
ALT SERPL W/O P-5'-P-CCNC: 16 U/L (ref 10–44)
AMPHET+METHAMPHET UR QL: NEGATIVE
ANION GAP SERPL CALC-SCNC: 12 MMOL/L (ref 8–16)
AST SERPL-CCNC: 23 U/L (ref 10–40)
BARBITURATES UR QL SCN>200 NG/ML: NEGATIVE
BASOPHILS # BLD AUTO: 0.02 K/UL (ref 0–0.2)
BASOPHILS NFR BLD: 0.3 % (ref 0–1.9)
BENZODIAZ UR QL SCN>200 NG/ML: NEGATIVE
BILIRUB SERPL-MCNC: 0.9 MG/DL (ref 0.1–1)
BNP SERPL-MCNC: 11 PG/ML (ref 0–99)
BUN SERPL-MCNC: 50 MG/DL (ref 6–20)
BZE UR QL SCN: NEGATIVE
CALCIUM SERPL-MCNC: 9.3 MG/DL (ref 8.7–10.5)
CANNABINOIDS UR QL SCN: ABNORMAL
CHLORIDE SERPL-SCNC: 94 MMOL/L (ref 95–110)
CK SERPL-CCNC: 390 U/L (ref 20–200)
CO2 SERPL-SCNC: 29 MMOL/L (ref 23–29)
CREAT SERPL-MCNC: 2.3 MG/DL (ref 0.5–1.4)
CREAT UR-MCNC: >450 MG/DL (ref 23–375)
DIFFERENTIAL METHOD: NORMAL
EOSINOPHIL # BLD AUTO: 0 K/UL (ref 0–0.5)
EOSINOPHIL NFR BLD: 0.6 % (ref 0–8)
ERYTHROCYTE [DISTWIDTH] IN BLOOD BY AUTOMATED COUNT: 12.9 % (ref 11.5–14.5)
EST. GFR  (AFRICAN AMERICAN): 39 ML/MIN/1.73 M^2
EST. GFR  (NON AFRICAN AMERICAN): 34 ML/MIN/1.73 M^2
GLUCOSE SERPL-MCNC: 103 MG/DL (ref 70–110)
HCT VFR BLD AUTO: 44.2 % (ref 40–54)
HGB BLD-MCNC: 15.6 G/DL (ref 14–18)
IMM GRANULOCYTES # BLD AUTO: 0.02 K/UL (ref 0–0.04)
IMM GRANULOCYTES NFR BLD AUTO: 0.3 % (ref 0–0.5)
LYMPHOCYTES # BLD AUTO: 2.4 K/UL (ref 1–4.8)
LYMPHOCYTES NFR BLD: 34 % (ref 18–48)
MCH RBC QN AUTO: 31 PG (ref 27–31)
MCHC RBC AUTO-ENTMCNC: 35.3 G/DL (ref 32–36)
MCV RBC AUTO: 88 FL (ref 82–98)
METHADONE UR QL SCN>300 NG/ML: NEGATIVE
MONOCYTES # BLD AUTO: 0.9 K/UL (ref 0.3–1)
MONOCYTES NFR BLD: 12.5 % (ref 4–15)
NEUTROPHILS # BLD AUTO: 3.7 K/UL (ref 1.8–7.7)
NEUTROPHILS NFR BLD: 52.3 % (ref 38–73)
NRBC BLD-RTO: 0 /100 WBC
OPIATES UR QL SCN: NEGATIVE
PCP UR QL SCN>25 NG/ML: NEGATIVE
PLATELET # BLD AUTO: 279 K/UL (ref 150–350)
PMV BLD AUTO: 9.6 FL (ref 9.2–12.9)
POTASSIUM SERPL-SCNC: 2.9 MMOL/L (ref 3.5–5.1)
PROT SERPL-MCNC: 7.5 G/DL (ref 6–8.4)
RBC # BLD AUTO: 5.03 M/UL (ref 4.6–6.2)
SODIUM SERPL-SCNC: 135 MMOL/L (ref 136–145)
TOXICOLOGY INFORMATION: ABNORMAL
TROPONIN I SERPL DL<=0.01 NG/ML-MCNC: 0.02 NG/ML (ref 0–0.03)
WBC # BLD AUTO: 7.03 K/UL (ref 3.9–12.7)

## 2020-10-30 PROCEDURE — G0378 HOSPITAL OBSERVATION PER HR: HCPCS

## 2020-10-30 PROCEDURE — 25000003 PHARM REV CODE 250: Performed by: NURSE PRACTITIONER

## 2020-10-30 PROCEDURE — 84484 ASSAY OF TROPONIN QUANT: CPT

## 2020-10-30 PROCEDURE — 36415 COLL VENOUS BLD VENIPUNCTURE: CPT

## 2020-10-30 PROCEDURE — 83880 ASSAY OF NATRIURETIC PEPTIDE: CPT

## 2020-10-30 PROCEDURE — 96361 HYDRATE IV INFUSION ADD-ON: CPT | Performed by: EMERGENCY MEDICINE

## 2020-10-30 PROCEDURE — 63600175 PHARM REV CODE 636 W HCPCS: Performed by: NURSE PRACTITIONER

## 2020-10-30 PROCEDURE — 80053 COMPREHEN METABOLIC PANEL: CPT

## 2020-10-30 PROCEDURE — 96375 TX/PRO/DX INJ NEW DRUG ADDON: CPT | Performed by: EMERGENCY MEDICINE

## 2020-10-30 PROCEDURE — 85025 COMPLETE CBC W/AUTO DIFF WBC: CPT

## 2020-10-30 PROCEDURE — 82550 ASSAY OF CK (CPK): CPT

## 2020-10-30 RX ORDER — IBUPROFEN 200 MG
24 TABLET ORAL
Status: DISCONTINUED | OUTPATIENT
Start: 2020-10-30 | End: 2020-10-31 | Stop reason: HOSPADM

## 2020-10-30 RX ORDER — ACETAMINOPHEN 325 MG/1
650 TABLET ORAL EVERY 6 HOURS PRN
Status: DISCONTINUED | OUTPATIENT
Start: 2020-10-30 | End: 2020-10-31 | Stop reason: HOSPADM

## 2020-10-30 RX ORDER — AMLODIPINE BESYLATE 5 MG/1
10 TABLET ORAL DAILY
Status: DISCONTINUED | OUTPATIENT
Start: 2020-10-30 | End: 2020-10-31 | Stop reason: HOSPADM

## 2020-10-30 RX ORDER — PROCHLORPERAZINE EDISYLATE 5 MG/ML
5 INJECTION INTRAMUSCULAR; INTRAVENOUS EVERY 6 HOURS PRN
Status: DISCONTINUED | OUTPATIENT
Start: 2020-10-30 | End: 2020-10-31 | Stop reason: HOSPADM

## 2020-10-30 RX ORDER — SODIUM CHLORIDE 9 MG/ML
INJECTION, SOLUTION INTRAVENOUS CONTINUOUS
Status: DISCONTINUED | OUTPATIENT
Start: 2020-10-30 | End: 2020-10-31 | Stop reason: HOSPADM

## 2020-10-30 RX ORDER — POTASSIUM CHLORIDE 20 MEQ/1
40 TABLET, EXTENDED RELEASE ORAL
Status: COMPLETED | OUTPATIENT
Start: 2020-10-30 | End: 2020-10-30

## 2020-10-30 RX ORDER — GLUCAGON 1 MG
1 KIT INJECTION
Status: DISCONTINUED | OUTPATIENT
Start: 2020-10-30 | End: 2020-10-31 | Stop reason: HOSPADM

## 2020-10-30 RX ORDER — IBUPROFEN 200 MG
16 TABLET ORAL
Status: DISCONTINUED | OUTPATIENT
Start: 2020-10-30 | End: 2020-10-31 | Stop reason: HOSPADM

## 2020-10-30 RX ORDER — SODIUM CHLORIDE 0.9 % (FLUSH) 0.9 %
10 SYRINGE (ML) INJECTION
Status: DISCONTINUED | OUTPATIENT
Start: 2020-10-30 | End: 2020-10-31 | Stop reason: HOSPADM

## 2020-10-30 RX ADMIN — POTASSIUM CHLORIDE 40 MEQ: 1500 TABLET, FILM COATED, EXTENDED RELEASE ORAL at 08:10

## 2020-10-30 RX ADMIN — PROCHLORPERAZINE EDISYLATE 5 MG: 5 INJECTION INTRAMUSCULAR; INTRAVENOUS at 04:10

## 2020-10-30 RX ADMIN — ACETAMINOPHEN 650 MG: 325 TABLET ORAL at 08:10

## 2020-10-30 RX ADMIN — SODIUM CHLORIDE: 0.9 INJECTION, SOLUTION INTRAVENOUS at 04:10

## 2020-10-30 RX ADMIN — POTASSIUM CHLORIDE 40 MEQ: 1500 TABLET, FILM COATED, EXTENDED RELEASE ORAL at 11:10

## 2020-10-30 RX ADMIN — AMLODIPINE BESYLATE 10 MG: 5 TABLET ORAL at 08:10

## 2020-10-30 RX ADMIN — SODIUM CHLORIDE: 0.9 INJECTION, SOLUTION INTRAVENOUS at 06:10

## 2020-10-30 NOTE — PROGRESS NOTES
WRITTEN HEALTHCARE DISCHARGE INFORMATION      Things that YOU are RESPONSIBLE for to Manage Your Care At Home:     1. Getting your prescriptions filled.  2. Taking you medications as directed. DO NOT MISS ANY DOSES!  3. Going to your follow-up doctor appointments. This is important because it allows the doctor to monitor your progress and to determine if any changes need to be made to your treatment plan.     If you are unable to make your follow up appointments, please call the number listed and reschedule this appointment.      ____________HELP AT HOME____________________     Experiencing any SIGNS or SYMPTOMS: YOU CAN     Schedule a same day appopintment with your Primary Care Doctor or  you can call [facility] On Call Nurse Care Line for 24/7 assistance at [phone]     If you are experience any signs or symptoms that have become severe, Call 911 and come to your nearest Emergency Room.     Thank you for choosing [facility] and allowing us to care for you.   From your care management team:      You should receive a call from [facility] Discharge Department within 48-72 hours to help manage your care after discharge. Please try to make sure that you answer your phone for this important phone call.      Follow-up Information     [facility].    Why: call at time of discharge to schedule follow up appointment post hospital discharge.  Contact information:  [address] [place] [address]  [phone]

## 2020-10-30 NOTE — ASSESSMENT & PLAN NOTE
Multiple ED visits/admission for dehydration/ADINA 2/2 marijuana abuse. Patient requestsrehabilitation today    - /Case management consult

## 2020-10-30 NOTE — ASSESSMENT & PLAN NOTE
Acute on CKD. sCr today 3.3. Last normal lab was last year. Multiple admit with dehydration due to nausea/vomit this year 2/2 marijuana/mojo uses. UA with blood same as last ED visit. CT renal study last ED visit 2 days ago 10/27/20 with no evidence of nephrolithiasis or hydronephrosis.    - IVF   - recheck labs in AM  - Avoid nephrotoxic agents and renal dose meds

## 2020-10-30 NOTE — NURSING
Pt arrived to unit via wheelchair on RA. NAD noted.  Oriented pt to room and call light. Instructed to call for assistance.  Bed alarm set.  Will continue to monitor.

## 2020-10-30 NOTE — ASSESSMENT & PLAN NOTE
Seems chronic. Normal  Albumin. No hx family cancer except uncle with stomach cancer in his 50's.      - Follow up outpatient  - Consider check ? SPEP/UPEP, free light chains, peripheral smear as warranted.

## 2020-10-30 NOTE — PLAN OF CARE
10/30/20 1605   Discharge Assessment   Assessment Type Discharge Planning Assessment   Assessment information obtained from? Medical Record   Communicated expected length of stay with patient/caregiver yes   Prior to hospitilization cognitive status: Alert/Oriented   Prior to hospitalization functional status: Independent   Current cognitive status: Alert/Oriented   Current Functional Status: Independent   Facility Arrived From: home   Lives With other (see comments)   Able to Return to Prior Arrangements yes   Is patient able to care for self after discharge? Yes   Patient's perception of discharge disposition home or selfcare   Readmission Within the Last 30 Days no previous admission in last 30 days   Patient currently being followed by outpatient case management? No   Patient currently receives any other outside agency services? No   Equipment Currently Used at Home none   Do you have any problems affording any of your prescribed medications? No   Is the patient taking medications as prescribed? yes   Does the patient have transportation home? Yes   Transportation Anticipated family or friend will provide   Does the patient receive services at the [facility]? No   Discharge Plan A Home   DME Needed Upon Discharge  none   Patient/Family in Agreement with Plan yes     [id number] - [place] - [address]  [place] [address]  Phone: [phone] Fax: [phone]

## 2020-10-30 NOTE — SUBJECTIVE & OBJECTIVE
Past Medical History:   Diagnosis Date    Addiction to drug     Gastritis     GERD (gastroesophageal reflux disease)     Hallucination     Hypertension     Renal disorder     Sleep difficulties     Substance abuse        Past Surgical History:   Procedure Laterality Date    LEG SURGERY Right     NO PAST SURGERIES  2019       Review of patient's allergies indicates:  No Known Allergies    No current facility-administered medications on file prior to encounter.      Current Outpatient Medications on File Prior to Encounter   Medication Sig    ondansetron (ZOFRAN) 4 MG tablet Take 1 tablet (4 mg total) by mouth every 6 (six) hours.    amLODIPine (NORVASC) 10 MG tablet Take 1 tablet (10 mg total) by mouth once daily.    [] potassium chloride 10% (KAYCIEL) 20 mEq/15 mL oral solution Take 30 mLs (40 mEq total) by mouth 2 (two) times daily. for 2 days    promethazine (PHENERGAN) 25 MG suppository Place 1 suppository (25 mg total) rectally every 6 (six) hours as needed for Nausea.     Family History     Problem Relation (Age of Onset)    Arthritis Mother    Crohn's disease Sister    Hearing loss Father        Tobacco Use    Smoking status: Former Smoker     Packs/day: 0.25     Years: 3.00     Pack years: 0.75    Smokeless tobacco: Never Used   Substance and Sexual Activity    Alcohol use: Yes     Comment: socially    Drug use: Yes     Comment: mojo daily    Sexual activity: Yes     Partners: Female     Birth control/protection: None     Review of Systems   Constitutional: Positive for fatigue. Negative for chills, diaphoresis and fever.   HENT: Negative for congestion and sore throat.    Eyes: Negative for visual disturbance.   Respiratory: Negative for cough, shortness of breath and wheezing.    Cardiovascular: Positive for leg swelling. Negative for chest pain and palpitations.   Gastrointestinal: Positive for abdominal pain, nausea and vomiting. Negative for blood in stool and diarrhea.    Genitourinary: Negative for dysuria, flank pain, frequency and hematuria.   Musculoskeletal: Negative for arthralgias, joint swelling, myalgias, neck pain and neck stiffness.   Skin: Negative for rash.   Neurological: Positive for light-headedness. Negative for dizziness, seizures, syncope, weakness, numbness and headaches.   Psychiatric/Behavioral: Negative for confusion and hallucinations. The patient is nervous/anxious.      Objective:     Vital Signs (Most Recent):  Temp: 99.6 °F (37.6 °C) (10/30/20 0141)  Pulse: 72 (10/30/20 0141)  Resp: 17 (10/30/20 0141)  BP: (!) 154/104 (10/30/20 0141)  SpO2: 98 % (10/30/20 0141) Vital Signs (24h Range):  Temp:  [98.2 °F (36.8 °C)-99.7 °F (37.6 °C)] 99.6 °F (37.6 °C)  Pulse:  [72-96] 72  Resp:  [16-19] 17  SpO2:  [98 %-99 %] 98 %  BP: (133-181)/() 154/104     Weight: 77.1 kg (170 lb)  Body mass index is 21.83 kg/m².    Physical Exam  Constitutional:       General: He is not in acute distress.     Appearance: He is not diaphoretic.   HENT:      Head: Normocephalic and atraumatic.      Nose: No congestion or rhinorrhea.   Eyes:      Pupils: Pupils are equal, round, and reactive to light.   Neck:      Musculoskeletal: Normal range of motion and neck supple.   Cardiovascular:      Rate and Rhythm: Normal rate and regular rhythm.      Pulses: Normal pulses.      Heart sounds: Normal heart sounds.   Pulmonary:      Effort: Pulmonary effort is normal. No respiratory distress.      Breath sounds: Normal breath sounds. No wheezing, rhonchi or rales.   Chest:      Chest wall: No tenderness.   Abdominal:      General: Bowel sounds are normal.      Palpations: Abdomen is soft.      Tenderness: There is no abdominal tenderness. There is no right CVA tenderness, left CVA tenderness, guarding or rebound.   Musculoskeletal: Normal range of motion.         General: No swelling or tenderness.      Right lower leg: No edema.      Left lower leg: No edema.   Skin:     General: Skin is  warm and dry.   Neurological:      General: No focal deficit present.      Mental Status: He is alert and oriented to person, place, and time.   Psychiatric:         Mood and Affect: Affect is tearful.         Speech: Speech normal.         Behavior: Behavior is cooperative.         Thought Content: Thought content normal.         Judgment: Judgment normal.           CRANIAL NERVES     CN III, IV, VI   Pupils are equal, round, and reactive to light.       Significant Labs:   CBC:   Recent Labs   Lab 10/29/20  1705   WBC 8.45   HGB 17.2   HCT 49.0   *     CMP:   Recent Labs   Lab 10/29/20  1705   *   K 3.3*   CL 85*   CO2 29   *   BUN 59*   CREATININE 3.3*   CALCIUM 9.8   PROT 9.1*   ALBUMIN 4.9   BILITOT 0.9   ALKPHOS 124   AST 33   ALT 22   ANIONGAP 17*   EGFRNONAA 22*     Cardiac Markers: No results for input(s): CKMB, MYOGLOBIN, BNP, TROPISTAT in the last 48 hours.  Lipase:   Recent Labs   Lab 10/29/20  1705   LIPASE 25     Troponin: No results for input(s): TROPONINI in the last 48 hours.  Urine Studies:   Recent Labs   Lab 10/29/20  1741   COLORU Yellow   APPEARANCEUA Hazy*   PHUR 5.0   SPECGRAV 1.020   PROTEINUA 2+*   GLUCUA Negative   KETONESU Negative   BILIRUBINUA Negative   OCCULTUA 2+*   NITRITE Negative   UROBILINOGEN 2.0-3.0*   LEUKOCYTESUR Negative   RBCUA 5*   WBCUA 2   BACTERIA Moderate*   HYALINECASTS 3*     All pertinent labs within the past 24 hours have been reviewed.    Significant Imaging:     Imaging Results          X-Ray Chest AP Portable (Final result)  Result time 10/29/20 19:01:12    Final result by Nathan Steven MD (10/29/20 19:01:12)                 Impression:      No acute process.      Electronically signed by: Nathan Steven MD  Date:    10/29/2020  Time:    19:01             Narrative:    EXAMINATION:  XR CHEST AP PORTABLE    CLINICAL HISTORY:  Chest pain, unspecified    TECHNIQUE:  Single frontal view of the chest was  performed.    COMPARISON:  09/16/2020.    FINDINGS:  The trachea is unremarkable.  The cardiomediastinal silhouette is within normal limits.  The hilar structures are unremarkable.  The hemidiaphragms are within normal limits.  There is no evidence of free air beneath the hemidiaphragms.  There are no pleural effusions.  There is no evidence of a pneumothorax.  There is no evidence of pneumomediastinum.  No airspace opacity is present.  The osseous structures are unremarkable.                                US Abdomen Limited (Final result)  Result time 10/29/20 18:43:05    Final result by Debra Sorenson MD (10/29/20 18:43:05)                 Impression:      Echogenic lesions within the liver.  Differential considerations include hemangiomas, metastases, and primary hepatic neoplasia.  No increase in size of a previously seen lesion in the right lobe of the liver.  Additional smaller lesion seen on the current exam in the right lobe of the liver.    This report was flagged in Epic as abnormal.  .      Electronically signed by: Debra Sorenson  Date:    10/29/2020  Time:    18:43             Narrative:    EXAMINATION:  ULTRASOUND ABDOMEN LIMITED    CLINICAL HISTORY:  epigastric pain;    TECHNIQUE:  Limited ultrasound of the right upper quadrant of the abdomen (including pancreas, liver, gallbladder, common bile duct, and spleen) was performed.    COMPARISON:  10/14/2019    FINDINGS:  Liver: Normal in size, measuring 16.4 cm. Homogeneous echotexture. There are echogenic lesions within the liver.  One measures 1.6 x 1.8 x 1.9 cm.  It is compared to a previously measured echogenic lesion at 2.0 x 1.9 x 2.1 cm.  Another measures 0.9 x 0.6 x 0.6 cm and was not visualized on the previous exam.    Gallbladder: No calculi.  No wall thickening, or pericholecystic fluid.  No sonographic Brambila's sign.    Biliary system: The common duct is not dilated, measuring 2.1 mm.  No intrahepatic ductal dilatation.    Spleen: The  spleen is normal in size, measuring 9.7 x 3.9 cm.  A splenule is present.    Miscellaneous: Pancreas is obscured by overlying bowel gas.                              EKG 91 NSR prolonged QTc, no STEMI

## 2020-10-30 NOTE — ASSESSMENT & PLAN NOTE
"Noted on US abdomen "Echogenic lesions within the liver.  Differential considerations include hemangiomas, metastases, and primary hepatic neoplasia.  No increase in size of a previously seen lesion in the right lobe of the liver.  Additional smaller lesion seen on the current exam in the right lobe of the liver"    - Discussed with patient to follow up outpatient    "

## 2020-10-30 NOTE — HPI
"This is 45 y.o male with HTN and substance abuse who presents to the hospital complaining of epigastric pain and nausea/vomiting for 1 day. Patient reports his symptoms started last night around 10 pm after he tried to quit using "mojo". His last use "mojo" was 2 days ago. He reports he has had multiple episodes of NBNB emesisand he can not keep any food or drink down. Patient states he feels weak and lightheaded.  He is concerned that he is dehydrated again. He took Zofran at home without relief. He states his abdominal pain was at top of his stomache and worsened with vomiting/heaving. He also complains of intermittent bilateral leg swelling. Patient admits he has been seen in ED multiple times for these symptoms of epigastric pain and N/V when he tried to quit "Mojo". His last ED visit was 2 days ago on 10/28/2020. He was found to have ADINA and hypokalemia and blood in urine during last visit. CT renal study with  no evidence of nephrolithiasis or hydronephrosis. He denies any fever, chest pain, SOB, cough, hematemesis, melena, dysuria, diarrhea. He denies tobacco smoking and other drug uses. He states he drinks alcohol occasionally. He request a consult for rehabilitation. He reports his uncle has stomach cancer in his 50's, no other family hx.    In ED, labs with K 3.3 and Cr 3.3; Total protein 9.1. UA with protein, blood and bacteria.  US Abdomen with echogenic lesions within the liver.    Patient is placed in Observation for further evaluation and management dehydration and ADINA.  "

## 2020-10-30 NOTE — NURSING
"Patient returned to room from echo. Patient awake, alert, oriented . New orders for clear diet (advance as tolerated) received. Pt provided apple juice until tray comes. Pt drank three containers in two minutes, then complaints of stomach pain, "I drank too fast." prn apap given. Pt disconnected own IV to get in shower. Steady gait noted. No apparent distress noted at this time.  "

## 2020-10-30 NOTE — H&P
"[facility] Hospital Medicine  History & Physical    Patient Name: Jayson Berkowitz  MRN: [id number]  Admission Date: 10/29/2020  Attending Physician: Norma Patel MD   Primary Care Provider: Primary Doctor No         Patient information was obtained from patient and ER records.     Subjective:     Principal Problem:Acute kidney injury    Chief Complaint:   Chief Complaint   Patient presents with    Nausea     Nausea/vomiting x 1 week, states he can't keep anything down         HPI: This is 45 y.o male with HTN and substance abuse who presents to the hospital complaining of epigastric pain and nausea/vomiting for 1 day. Patient reports his symptoms started last night around 10 pm after he tried to quit using "mojo". His last use "mojo" was 2 days ago. He reports he has had multiple episodes of NBNB emesisand he can not keep any food or drink down. Patient states he feels weak and lightheaded.  He is concerned that he is dehydrated again. He took Zofran at home without relief. He states his abdominal pain was at top of his stomache and worsened with vomiting/heaving. He also complains of intermittent bilateral leg swelling. Patient admits he has been seen in ED multiple times for these symptoms of epigastric pain and N/V when he tried to quit "Mojo". His last ED visit was 2 days ago on 10/28/2020. He was found to have ADINA and hypokalemia and blood in urine during last visit. CT renal study with  no evidence of nephrolithiasis or hydronephrosis. He denies any fever, chest pain, SOB, cough, hematemesis, melena, dysuria, diarrhea. He denies tobacco smoking and other drug uses. He states he drinks alcohol occasionally. He request a consult for rehabilitation. He reports his uncle has stomach cancer in his 50's, no other family hx.    In ED, labs with K 3.3 and Cr 3.3; Total protein 9.1. UA with protein, blood and bacteria.  US Abdomen with echogenic lesions within the liver.    Patient is placed in " Observation for further evaluation and management dehydration and ADINA.    Past Medical History:   Diagnosis Date    Addiction to drug     Gastritis     GERD (gastroesophageal reflux disease)     Hallucination     Hypertension     Renal disorder     Sleep difficulties     Substance abuse        Past Surgical History:   Procedure Laterality Date    LEG SURGERY Right     NO PAST SURGERIES  2019       Review of patient's allergies indicates:  No Known Allergies    No current facility-administered medications on file prior to encounter.      Current Outpatient Medications on File Prior to Encounter   Medication Sig    ondansetron (ZOFRAN) 4 MG tablet Take 1 tablet (4 mg total) by mouth every 6 (six) hours.    amLODIPine (NORVASC) 10 MG tablet Take 1 tablet (10 mg total) by mouth once daily.    [] potassium chloride 10% (KAYCIEL) 20 mEq/15 mL oral solution Take 30 mLs (40 mEq total) by mouth 2 (two) times daily. for 2 days    promethazine (PHENERGAN) 25 MG suppository Place 1 suppository (25 mg total) rectally every 6 (six) hours as needed for Nausea.     Family History     Problem Relation (Age of Onset)    Arthritis Mother    Crohn's disease Sister    Hearing loss Father        Tobacco Use    Smoking status: Former Smoker     Packs/day: 0.25     Years: 3.00     Pack years: 0.75    Smokeless tobacco: Never Used   Substance and Sexual Activity    Alcohol use: Yes     Comment: socially    Drug use: Yes     Comment: mojo daily    Sexual activity: Yes     Partners: Female     Birth control/protection: None     Review of Systems   Constitutional: Positive for fatigue. Negative for chills, diaphoresis and fever.   HENT: Negative for congestion and sore throat.    Eyes: Negative for visual disturbance.   Respiratory: Negative for cough, shortness of breath and wheezing.    Cardiovascular: Positive for leg swelling. Negative for chest pain and palpitations.   Gastrointestinal: Positive for  abdominal pain, nausea and vomiting. Negative for blood in stool and diarrhea.   Genitourinary: Negative for dysuria, flank pain, frequency and hematuria.   Musculoskeletal: Negative for arthralgias, joint swelling, myalgias, neck pain and neck stiffness.   Skin: Negative for rash.   Neurological: Positive for light-headedness. Negative for dizziness, seizures, syncope, weakness, numbness and headaches.   Psychiatric/Behavioral: Negative for confusion and hallucinations. The patient is nervous/anxious.      Objective:     Vital Signs (Most Recent):  Temp: 99.6 °F (37.6 °C) (10/30/20 0141)  Pulse: 72 (10/30/20 0141)  Resp: 17 (10/30/20 0141)  BP: (!) 154/104 (10/30/20 0141)  SpO2: 98 % (10/30/20 0141) Vital Signs (24h Range):  Temp:  [98.2 °F (36.8 °C)-99.7 °F (37.6 °C)] 99.6 °F (37.6 °C)  Pulse:  [72-96] 72  Resp:  [16-19] 17  SpO2:  [98 %-99 %] 98 %  BP: (133-181)/() 154/104     Weight: 77.1 kg (170 lb)  Body mass index is 21.83 kg/m².    Physical Exam  Constitutional:       General: He is not in acute distress.     Appearance: He is not diaphoretic.   HENT:      Head: Normocephalic and atraumatic.      Nose: No congestion or rhinorrhea.   Eyes:      Pupils: Pupils are equal, round, and reactive to light.   Neck:      Musculoskeletal: Normal range of motion and neck supple.   Cardiovascular:      Rate and Rhythm: Normal rate and regular rhythm.      Pulses: Normal pulses.      Heart sounds: Normal heart sounds.   Pulmonary:      Effort: Pulmonary effort is normal. No respiratory distress.      Breath sounds: Normal breath sounds. No wheezing, rhonchi or rales.   Chest:      Chest wall: No tenderness.   Abdominal:      General: Bowel sounds are normal.      Palpations: Abdomen is soft.      Tenderness: There is no abdominal tenderness. There is no right CVA tenderness, left CVA tenderness, guarding or rebound.   Musculoskeletal: Normal range of motion.         General: No swelling or tenderness.      Right  lower leg: No edema.      Left lower leg: No edema.   Skin:     General: Skin is warm and dry.   Neurological:      General: No focal deficit present.      Mental Status: He is alert and oriented to person, place, and time.   Psychiatric:         Mood and Affect: Affect is tearful.         Speech: Speech normal.         Behavior: Behavior is cooperative.         Thought Content: Thought content normal.         Judgment: Judgment normal.           CRANIAL NERVES     CN III, IV, VI   Pupils are equal, round, and reactive to light.       Significant Labs:   CBC:   Recent Labs   Lab 10/29/20  1705   WBC 8.45   HGB 17.2   HCT 49.0   *     CMP:   Recent Labs   Lab 10/29/20  1705   *   K 3.3*   CL 85*   CO2 29   *   BUN 59*   CREATININE 3.3*   CALCIUM 9.8   PROT 9.1*   ALBUMIN 4.9   BILITOT 0.9   ALKPHOS 124   AST 33   ALT 22   ANIONGAP 17*   EGFRNONAA 22*     Cardiac Markers: No results for input(s): CKMB, MYOGLOBIN, BNP, TROPISTAT in the last 48 hours.  Lipase:   Recent Labs   Lab 10/29/20  1705   LIPASE 25     Troponin: No results for input(s): TROPONINI in the last 48 hours.  Urine Studies:   Recent Labs   Lab 10/29/20  1741   COLORU Yellow   APPEARANCEUA Hazy*   PHUR 5.0   SPECGRAV 1.020   PROTEINUA 2+*   GLUCUA Negative   KETONESU Negative   BILIRUBINUA Negative   OCCULTUA 2+*   NITRITE Negative   UROBILINOGEN 2.0-3.0*   LEUKOCYTESUR Negative   RBCUA 5*   WBCUA 2   BACTERIA Moderate*   HYALINECASTS 3*     All pertinent labs within the past 24 hours have been reviewed.    Significant Imaging:     Imaging Results          X-Ray Chest AP Portable (Final result)  Result time 10/29/20 19:01:12    Final result by Nathan Steven MD (10/29/20 19:01:12)                 Impression:      No acute process.      Electronically signed by: Nathan Steven MD  Date:    10/29/2020  Time:    19:01             Narrative:    EXAMINATION:  XR CHEST AP PORTABLE    CLINICAL HISTORY:  Chest pain,  unspecified    TECHNIQUE:  Single frontal view of the chest was performed.    COMPARISON:  09/16/2020.    FINDINGS:  The trachea is unremarkable.  The cardiomediastinal silhouette is within normal limits.  The hilar structures are unremarkable.  The hemidiaphragms are within normal limits.  There is no evidence of free air beneath the hemidiaphragms.  There are no pleural effusions.  There is no evidence of a pneumothorax.  There is no evidence of pneumomediastinum.  No airspace opacity is present.  The osseous structures are unremarkable.                                US Abdomen Limited (Final result)  Result time 10/29/20 18:43:05    Final result by Debra Sorenson MD (10/29/20 18:43:05)                 Impression:      Echogenic lesions within the liver.  Differential considerations include hemangiomas, metastases, and primary hepatic neoplasia.  No increase in size of a previously seen lesion in the right lobe of the liver.  Additional smaller lesion seen on the current exam in the right lobe of the liver.    This report was flagged in Epic as abnormal.  .      Electronically signed by: Dbera Sorenson  Date:    10/29/2020  Time:    18:43             Narrative:    EXAMINATION:  ULTRASOUND ABDOMEN LIMITED    CLINICAL HISTORY:  epigastric pain;    TECHNIQUE:  Limited ultrasound of the right upper quadrant of the abdomen (including pancreas, liver, gallbladder, common bile duct, and spleen) was performed.    COMPARISON:  10/14/2019    FINDINGS:  Liver: Normal in size, measuring 16.4 cm. Homogeneous echotexture. There are echogenic lesions within the liver.  One measures 1.6 x 1.8 x 1.9 cm.  It is compared to a previously measured echogenic lesion at 2.0 x 1.9 x 2.1 cm.  Another measures 0.9 x 0.6 x 0.6 cm and was not visualized on the previous exam.    Gallbladder: No calculi.  No wall thickening, or pericholecystic fluid.  No sonographic Brambila's sign.    Biliary system: The common duct is not dilated,  "measuring 2.1 mm.  No intrahepatic ductal dilatation.    Spleen: The spleen is normal in size, measuring 9.7 x 3.9 cm.  A splenule is present.    Miscellaneous: Pancreas is obscured by overlying bowel gas.                              EKG 91 NSR prolonged QTc, no STEMI    Assessment/Plan:     * Acute kidney injury  Acute on CKD. sCr today 3.3. Last normal lab was last year. Multiple admit with dehydration due to nausea/vomit this year 2/2 marijuana/mojo uses. UA with blood same as last ED visit. CT renal study last ED visit 2 days ago 10/27/20 with no evidence of nephrolithiasis or hydronephrosis.    - IVF   - recheck labs in AM  - Avoid nephrotoxic agents and renal dose meds      Liver lesion  Noted on US abdomen "Echogenic lesions within the liver.  Differential considerations include hemangiomas, metastases, and primary hepatic neoplasia.  No increase in size of a previously seen lesion in the right lobe of the liver.  Additional smaller lesion seen on the current exam in the right lobe of the liver"    - Discussed with patient to follow up outpatient      Elevated total protein  Seems chronic. Normal  Albumin. No hx family cancer except uncle with stomach cancer in his 50's.      - Follow up outpatient  - Consider check ? SPEP/UPEP, free light chains, peripheral smear as warranted.       Essential hypertension  -180's. Not compliant with home med. Complains intermittent bilateral leg edema. No edema on my exam.    - Resume home med Amlodipine  - Echo pending      Marijuana use  Multiple ED visits/admission for dehydration/ADINA 2/2 marijuana abuse. Patient requestsrehabilitation today    - /Case management consult      Intractable vomiting with nausea  Likely 2/2 marijuana use vs withdrawal s/s.    - Support care with antiemetics  - IVF       Hypokalemia  K+ 3.3 today. Replaced in ED    - Repeat lab in AM        VTE Risk Mitigation (From admission, onward)         Ordered     IP VTE LOW RISK " PATIENT  Once      10/30/20 0213     Place sequential compression device  Until discontinued      10/30/20 0213     Place KRISTAL hose  Until discontinued      10/30/20 0213               As clarification, on 10/29/2020  , patient should be admitted for hospital observation services under my care in collaboration with Dr. Norma Kramer NP  Department of Hospital Medicine   [facility]

## 2020-10-30 NOTE — ASSESSMENT & PLAN NOTE
-180's. Not compliant with home med. Complains intermittent bilateral leg edema. No edema on my exam.    - Resume home med Amlodipine  - Echo pending

## 2020-10-30 NOTE — NURSING
Patient to echo via wc as ordered. Patient awake, alert, oriented without c/o discomfort at this time, no N/V. No apparent distress noted.

## 2020-10-31 VITALS
DIASTOLIC BLOOD PRESSURE: 101 MMHG | OXYGEN SATURATION: 99 % | WEIGHT: 176.81 LBS | SYSTOLIC BLOOD PRESSURE: 161 MMHG | HEIGHT: 74 IN | BODY MASS INDEX: 22.69 KG/M2 | RESPIRATION RATE: 17 BRPM | HEART RATE: 60 BPM | TEMPERATURE: 98 F

## 2020-10-31 LAB
ALBUMIN SERPL BCP-MCNC: 3.5 G/DL (ref 3.5–5.2)
ALP SERPL-CCNC: 89 U/L (ref 55–135)
ALT SERPL W/O P-5'-P-CCNC: 17 U/L (ref 10–44)
ANION GAP SERPL CALC-SCNC: 9 MMOL/L (ref 8–16)
AORTIC ROOT ANNULUS: 3.62 CM
AORTIC VALVE CUSP SEPERATION: 2.46 CM
AST SERPL-CCNC: 22 U/L (ref 10–40)
AV INDEX (PROSTH): 0.84
AV MEAN GRADIENT: 5 MMHG
AV PEAK GRADIENT: 10 MMHG
AV VALVE AREA: 3.57 CM2
AV VELOCITY RATIO: 0.82
BASOPHILS # BLD AUTO: 0.04 K/UL (ref 0–0.2)
BASOPHILS NFR BLD: 0.7 % (ref 0–1.9)
BILIRUB SERPL-MCNC: 0.8 MG/DL (ref 0.1–1)
BSA FOR ECHO PROCEDURE: 1.99 M2
BUN SERPL-MCNC: 33 MG/DL (ref 6–20)
CALCIUM SERPL-MCNC: 9.3 MG/DL (ref 8.7–10.5)
CHLORIDE SERPL-SCNC: 100 MMOL/L (ref 95–110)
CO2 SERPL-SCNC: 28 MMOL/L (ref 23–29)
CREAT SERPL-MCNC: 1.3 MG/DL (ref 0.5–1.4)
CV ECHO LV RWT: 0.34 CM
DIFFERENTIAL METHOD: ABNORMAL
DOP CALC AO PEAK VEL: 1.58 M/S
DOP CALC AO VTI: 29.04 CM
DOP CALC LVOT AREA: 4.2 CM2
DOP CALC LVOT DIAMETER: 2.32 CM
DOP CALC LVOT PEAK VEL: 1.29 M/S
DOP CALC LVOT STROKE VOLUME: 103.6 CM3
DOP CALCLVOT PEAK VEL VTI: 24.52 CM
E WAVE DECELERATION TIME: 365.58 MSEC
E/A RATIO: 1.12
E/E' RATIO: 5.84 M/S
ECHO LV POSTERIOR WALL: 0.93 CM (ref 0.6–1.1)
EOSINOPHIL # BLD AUTO: 0.1 K/UL (ref 0–0.5)
EOSINOPHIL NFR BLD: 1.5 % (ref 0–8)
ERYTHROCYTE [DISTWIDTH] IN BLOOD BY AUTOMATED COUNT: 12.9 % (ref 11.5–14.5)
EST. GFR  (AFRICAN AMERICAN): >60 ML/MIN/1.73 M^2
EST. GFR  (NON AFRICAN AMERICAN): >60 ML/MIN/1.73 M^2
FRACTIONAL SHORTENING: 33 % (ref 28–44)
GLUCOSE SERPL-MCNC: 93 MG/DL (ref 70–110)
HCT VFR BLD AUTO: 38.5 % (ref 40–54)
HGB BLD-MCNC: 13.6 G/DL (ref 14–18)
IMM GRANULOCYTES # BLD AUTO: 0.02 K/UL (ref 0–0.04)
IMM GRANULOCYTES NFR BLD AUTO: 0.3 % (ref 0–0.5)
INTERVENTRICULAR SEPTUM: 0.69 CM (ref 0.6–1.1)
IVRT: 111.32 MSEC
LA MAJOR: 4.89 CM
LA MINOR: 4.88 CM
LEFT ATRIUM SIZE: 2.32 CM
LEFT INTERNAL DIMENSION IN SYSTOLE: 3.65 CM (ref 2.1–4)
LEFT VENTRICLE DIASTOLIC VOLUME INDEX: 71.28 ML/M2
LEFT VENTRICLE DIASTOLIC VOLUME: 143.83 ML
LEFT VENTRICLE MASS INDEX: 79 G/M2
LEFT VENTRICLE SYSTOLIC VOLUME INDEX: 27.8 ML/M2
LEFT VENTRICLE SYSTOLIC VOLUME: 56.15 ML
LEFT VENTRICULAR INTERNAL DIMENSION IN DIASTOLE: 5.44 CM (ref 3.5–6)
LEFT VENTRICULAR MASS: 159.43 G
LV LATERAL E/E' RATIO: 4.29 M/S
LV SEPTAL E/E' RATIO: 9.13 M/S
LYMPHOCYTES # BLD AUTO: 3.2 K/UL (ref 1–4.8)
LYMPHOCYTES NFR BLD: 53.4 % (ref 18–48)
MCH RBC QN AUTO: 30.6 PG (ref 27–31)
MCHC RBC AUTO-ENTMCNC: 35.3 G/DL (ref 32–36)
MCV RBC AUTO: 87 FL (ref 82–98)
MONOCYTES # BLD AUTO: 0.7 K/UL (ref 0.3–1)
MONOCYTES NFR BLD: 11.9 % (ref 4–15)
MV PEAK A VEL: 0.65 M/S
MV PEAK E VEL: 0.73 M/S
MV STENOSIS PRESSURE HALF TIME: 106.02 MS
MV VALVE AREA P 1/2 METHOD: 2.08 CM2
NEUTROPHILS # BLD AUTO: 1.9 K/UL (ref 1.8–7.7)
NEUTROPHILS NFR BLD: 32.2 % (ref 38–73)
NRBC BLD-RTO: 0 /100 WBC
PISA TR MAX VEL: 1.92 M/S
PLATELET # BLD AUTO: 252 K/UL (ref 150–350)
PMV BLD AUTO: 9.7 FL (ref 9.2–12.9)
POTASSIUM SERPL-SCNC: 3 MMOL/L (ref 3.5–5.1)
PROT SERPL-MCNC: 6.4 G/DL (ref 6–8.4)
PV PEAK VELOCITY: 1.25 CM/S
RA MAJOR: 4.08 CM
RA PRESSURE: 3 MMHG
RA WIDTH: 3.27 CM
RBC # BLD AUTO: 4.44 M/UL (ref 4.6–6.2)
RIGHT VENTRICULAR END-DIASTOLIC DIMENSION: 2.03 CM
SINUS: 3.2 CM
SODIUM SERPL-SCNC: 137 MMOL/L (ref 136–145)
STJ: 2.93 CM
TDI LATERAL: 0.17 M/S
TDI SEPTAL: 0.08 M/S
TDI: 0.13 M/S
TR MAX PG: 15 MMHG
TV REST PULMONARY ARTERY PRESSURE: 18 MMHG
WBC # BLD AUTO: 5.9 K/UL (ref 3.9–12.7)

## 2020-10-31 PROCEDURE — 36415 COLL VENOUS BLD VENIPUNCTURE: CPT

## 2020-10-31 PROCEDURE — G0378 HOSPITAL OBSERVATION PER HR: HCPCS

## 2020-10-31 PROCEDURE — 25000003 PHARM REV CODE 250: Performed by: NURSE PRACTITIONER

## 2020-10-31 PROCEDURE — 85025 COMPLETE CBC W/AUTO DIFF WBC: CPT

## 2020-10-31 PROCEDURE — 80053 COMPREHEN METABOLIC PANEL: CPT

## 2020-10-31 RX ORDER — AMLODIPINE BESYLATE 10 MG/1
10 TABLET ORAL DAILY
Qty: 30 TABLET | Refills: 3 | Status: SHIPPED | OUTPATIENT
Start: 2020-10-31 | End: 2020-12-04 | Stop reason: SDUPTHER

## 2020-10-31 RX ORDER — POTASSIUM CHLORIDE 20 MEQ/1
40 TABLET, EXTENDED RELEASE ORAL ONCE
Status: COMPLETED | OUTPATIENT
Start: 2020-10-31 | End: 2020-10-31

## 2020-10-31 RX ADMIN — AMLODIPINE BESYLATE 10 MG: 5 TABLET ORAL at 08:10

## 2020-10-31 RX ADMIN — POTASSIUM CHLORIDE 40 MEQ: 1500 TABLET, EXTENDED RELEASE ORAL at 10:10

## 2020-10-31 NOTE — ASSESSMENT & PLAN NOTE
-180's. Not compliant with home med. Complains intermittent bilateral leg edema. No edema on my exam.  Resume home med Amlodipine  Echo ordered pending

## 2020-10-31 NOTE — PLAN OF CARE
Problem: Adult Inpatient Plan of Care  Goal: Plan of Care Review  Outcome: Met  Goal: Patient-Specific Goal (Individualization)  Outcome: Met  Goal: Absence of Hospital-Acquired Illness or Injury  Outcome: Met  Goal: Optimal Comfort and Wellbeing  Outcome: Met  Goal: Readiness for Transition of Care  Outcome: Met  Goal: Rounds/Family Conference  Outcome: Met     Problem: Electrolyte Imbalance (Acute Kidney Injury/Impairment)  Goal: Serum Electrolyte Balance  Outcome: Met

## 2020-10-31 NOTE — PLAN OF CARE
Problem: Adult Inpatient Plan of Care  Goal: Plan of Care Review  Outcome: Ongoing, Progressing  Goal: Patient-Specific Goal (Individualization)  Outcome: Ongoing, Progressing  Goal: Absence of Hospital-Acquired Illness or Injury  Outcome: Ongoing, Progressing  Goal: Optimal Comfort and Wellbeing  Outcome: Ongoing, Progressing  Goal: Readiness for Transition of Care  Outcome: Ongoing, Progressing  Goal: Rounds/Family Conference  Outcome: Ongoing, Progressing     Problem: Electrolyte Imbalance (Acute Kidney Injury/Impairment)  Goal: Serum Electrolyte Balance  Outcome: Ongoing, Progressing     Problem: Fluid Imbalance (Acute Kidney Injury/Impairment)  Goal: Optimal Fluid Balance  Outcome: Ongoing, Progressing     Problem: Hematologic Alteration (Acute Kidney Injury/Impairment)  Goal: Hemoglobin, Hematocrit and Platelets Within Normal Range  Outcome: Ongoing, Progressing     Problem: Fall Injury Risk  Goal: Absence of Fall and Fall-Related Injury  Outcome: Ongoing, Progressing     Problem: Renal Function Impairment (Acute Kidney Injury/Impairment)  Goal: Effective Renal Function  Outcome: Ongoing, Progressing

## 2020-10-31 NOTE — PLAN OF CARE
10/31/2020      Jayson Berkowitz  [address] [place] [address]          Hospital Medicine Dept.  [facility] [address] [place] [address]  [phone] after hours  [phone] fax Jayson Berkowitz has been hospitalized at the [facility] since 10/29/2020.  Please excuse the patient from duties.  Patient may return on [place] 11/2/20.  No restrictions. COVID 19 negative on 10/29/20.    Please contact me if you have any questions.                  __________________________  Benita Pack NP  10/31/2020

## 2020-10-31 NOTE — ASSESSMENT & PLAN NOTE
Urine positive for for THC  Afebrile and no leukocytosis  Symptoms improving. Taking many hot showers make him feel better  Continue supportive care PRN antiemetics, IVF  CLD as tolerated

## 2020-10-31 NOTE — PROGRESS NOTES
"[facility] Hospital Medicine  Progress Note    Patient Name: Jayson Berkowitz  MRN: [id number]  Patient Class: OP- Observation   Admission Date: 10/29/2020  Length of Stay: 0 days  Attending Physician: Norma Patel MD  Primary Care Provider: Primary Doctor No        Subjective:     Principal Problem:Acute kidney injury        HPI:  This is 45 y.o male with HTN and substance abuse who presents to the hospital complaining of epigastric pain and nausea/vomiting for 1 day. Patient reports his symptoms started last night around 10 pm after he tried to quit using "mojo". His last use "mojo" was 2 days ago. He reports he has had multiple episodes of NBNB emesisand he can not keep any food or drink down. Patient states he feels weak and lightheaded.  He is concerned that he is dehydrated again. He took Zofran at home without relief. He states his abdominal pain was at top of his stomache and worsened with vomiting/heaving. He also complains of intermittent bilateral leg swelling. Patient admits he has been seen in ED multiple times for these symptoms of epigastric pain and N/V when he tried to quit "Mojo". His last ED visit was 2 days ago on 10/28/2020. He was found to have ADINA and hypokalemia and blood in urine during last visit. CT renal study with  no evidence of nephrolithiasis or hydronephrosis. He denies any fever, chest pain, SOB, cough, hematemesis, melena, dysuria, diarrhea. He denies tobacco smoking and other drug uses. He states he drinks alcohol occasionally. He request a consult for rehabilitation. He reports his uncle has stomach cancer in his 50's, no other family hx.    In ED, labs with K 3.3 and Cr 3.3; Total protein 9.1. UA with protein, blood and bacteria.  US Abdomen with echogenic lesions within the liver.    Patient is placed in Observation for further evaluation and management dehydration and ADINA.    Overview/Hospital Course:  No notes on file    Interval History:Still with nausea " and vomiting. abd pain improved.     Review of Systems   Constitutional: Positive for fatigue. Negative for chills, diaphoresis and fever.   HENT: Negative for congestion and sore throat.    Eyes: Negative for visual disturbance.   Respiratory: Negative for cough, shortness of breath and wheezing.    Cardiovascular: Positive for leg swelling. Negative for chest pain and palpitations.   Gastrointestinal: Positive for abdominal pain, nausea and vomiting. Negative for blood in stool and diarrhea.   Genitourinary: Negative for dysuria, flank pain, frequency and hematuria.   Musculoskeletal: Negative for arthralgias, joint swelling, myalgias, neck pain and neck stiffness.   Skin: Negative for rash.   Neurological: Positive for light-headedness. Negative for dizziness, seizures, syncope, weakness, numbness and headaches.   Psychiatric/Behavioral: Negative for confusion and hallucinations. The patient is nervous/anxious.      Objective:     Vital Signs (Most Recent):  Temp: 98.1 °F (36.7 °C) (10/30/20 0719)  Pulse: 65 (10/30/20 1800)  Resp: 18 (10/30/20 0719)  BP: 130/67 (10/30/20 1800)  SpO2: 95 % (10/30/20 0719) Vital Signs (24h Range):  Temp:  [98.1 °F (36.7 °C)-99.7 °F (37.6 °C)] 98.1 °F (36.7 °C)  Pulse:  [56-82] 65  Resp:  [16-18] 18  SpO2:  [95 %-99 %] 95 %  BP: (130-181)/() 130/67     Weight: 76.2 kg (168 lb)  Body mass index is 21.57 kg/m².    Intake/Output Summary (Last 24 hours) at 10/30/2020 2055  Last data filed at 10/29/2020 2330  Gross per 24 hour   Intake 1100 ml   Output --   Net 1100 ml      Physical Exam  Constitutional:       General: He is not in acute distress.     Appearance: He is not diaphoretic.   HENT:      Head: Normocephalic and atraumatic.      Nose: No congestion or rhinorrhea.   Eyes:      Pupils: Pupils are equal, round, and reactive to light.   Neck:      Musculoskeletal: Normal range of motion and neck supple.   Cardiovascular:      Rate and Rhythm: Normal rate and regular rhythm.  "     Pulses: Normal pulses.      Heart sounds: Normal heart sounds.   Pulmonary:      Effort: Pulmonary effort is normal. No respiratory distress.      Breath sounds: Normal breath sounds. No wheezing, rhonchi or rales.   Chest:      Chest wall: No tenderness.   Abdominal:      General: Bowel sounds are normal.      Palpations: Abdomen is soft.      Tenderness: There is no abdominal tenderness. There is no right CVA tenderness, left CVA tenderness, guarding or rebound.   Musculoskeletal: Normal range of motion.         General: No swelling or tenderness.      Right lower leg: No edema.      Left lower leg: No edema.   Skin:     General: Skin is warm and dry.   Neurological:      General: No focal deficit present.      Mental Status: He is alert and oriented to person, place, and time.   Psychiatric:         Mood and Affect: Affect is not tearful.         Speech: Speech normal.         Behavior: Behavior is cooperative.         Thought Content: Thought content normal.         Judgment: Judgment normal.         Significant Labs: All pertinent labs within the past 24 hours have been reviewed.    Significant Imaging: I have reviewed and interpreted all pertinent imaging results/findings within the past 24 hours.      Assessment/Plan:      * Acute kidney injury  Acute on CKD. sCr today 3.3. Last normal lab was last year. Multiple admit with dehydration due to nausea/vomit this year 2/2 marijuana/mojo uses. UA with blood same as last ED visit. CT renal study last ED visit 2 days ago 10/27/20 with no evidence of nephrolithiasis or hydronephrosis.  Improving  Continue IVF until tolerate oral  Repeat labs in am      Marijuana induced hyperemisis   Urine positive for for THC  Afebrile and no leukocytosis  Symptoms improving. Taking many hot showers make him feel better  Continue supportive care PRN antiemetics, IVF  CLD as tolerated    Liver lesion  Noted on US abdomen "Echogenic lesions within the liver.  Differential " "considerations include hemangiomas, metastases, and primary hepatic neoplasia.  No increase in size of a previously seen lesion in the right lobe of the liver.  Additional smaller lesion seen on the current exam in the right lobe of the liver"  Will ask GI recs further images while inpatient      Elevated total protein  Repeat labs normal range    Essential hypertension  -180's. Not compliant with home med. Complains intermittent bilateral leg edema. No edema on my exam.  Resume home med Amlodipine  Echo ordered pending      Marijuana use  See above      Hypokalemia  Replace to keep K 4.0        VTE Risk Mitigation (From admission, onward)         Ordered     IP VTE LOW RISK PATIENT  Once      10/30/20 0213     Place sequential compression device  Until discontinued      10/30/20 0213     Place KRISTAL hose  Until discontinued      10/30/20 0213                Discharge Planning   ELO:      Code Status: Full Code   Is the patient medically ready for discharge?:     Reason for patient still in hospital (select all that apply): Treatment  Discharge Plan A: Home                  Benita Pack NP  Department of Hospital Medicine   [facility]    "

## 2020-10-31 NOTE — SUBJECTIVE & OBJECTIVE
Interval History:Still with nausea and vomiting. abd pain improved.     Review of Systems   Constitutional: Positive for fatigue. Negative for chills, diaphoresis and fever.   HENT: Negative for congestion and sore throat.    Eyes: Negative for visual disturbance.   Respiratory: Negative for cough, shortness of breath and wheezing.    Cardiovascular: Positive for leg swelling. Negative for chest pain and palpitations.   Gastrointestinal: Positive for abdominal pain, nausea and vomiting. Negative for blood in stool and diarrhea.   Genitourinary: Negative for dysuria, flank pain, frequency and hematuria.   Musculoskeletal: Negative for arthralgias, joint swelling, myalgias, neck pain and neck stiffness.   Skin: Negative for rash.   Neurological: Positive for light-headedness. Negative for dizziness, seizures, syncope, weakness, numbness and headaches.   Psychiatric/Behavioral: Negative for confusion and hallucinations. The patient is nervous/anxious.      Objective:     Vital Signs (Most Recent):  Temp: 98.1 °F (36.7 °C) (10/30/20 0719)  Pulse: 65 (10/30/20 1800)  Resp: 18 (10/30/20 0719)  BP: 130/67 (10/30/20 1800)  SpO2: 95 % (10/30/20 0719) Vital Signs (24h Range):  Temp:  [98.1 °F (36.7 °C)-99.7 °F (37.6 °C)] 98.1 °F (36.7 °C)  Pulse:  [56-82] 65  Resp:  [16-18] 18  SpO2:  [95 %-99 %] 95 %  BP: (130-181)/() 130/67     Weight: 76.2 kg (168 lb)  Body mass index is 21.57 kg/m².    Intake/Output Summary (Last 24 hours) at 10/30/2020 2055  Last data filed at 10/29/2020 2330  Gross per 24 hour   Intake 1100 ml   Output --   Net 1100 ml      Physical Exam  Constitutional:       General: He is not in acute distress.     Appearance: He is not diaphoretic.   HENT:      Head: Normocephalic and atraumatic.      Nose: No congestion or rhinorrhea.   Eyes:      Pupils: Pupils are equal, round, and reactive to light.   Neck:      Musculoskeletal: Normal range of motion and neck supple.   Cardiovascular:      Rate and  Rhythm: Normal rate and regular rhythm.      Pulses: Normal pulses.      Heart sounds: Normal heart sounds.   Pulmonary:      Effort: Pulmonary effort is normal. No respiratory distress.      Breath sounds: Normal breath sounds. No wheezing, rhonchi or rales.   Chest:      Chest wall: No tenderness.   Abdominal:      General: Bowel sounds are normal.      Palpations: Abdomen is soft.      Tenderness: There is no abdominal tenderness. There is no right CVA tenderness, left CVA tenderness, guarding or rebound.   Musculoskeletal: Normal range of motion.         General: No swelling or tenderness.      Right lower leg: No edema.      Left lower leg: No edema.   Skin:     General: Skin is warm and dry.   Neurological:      General: No focal deficit present.      Mental Status: He is alert and oriented to person, place, and time.   Psychiatric:         Mood and Affect: Affect is not tearful.         Speech: Speech normal.         Behavior: Behavior is cooperative.         Thought Content: Thought content normal.         Judgment: Judgment normal.         Significant Labs: All pertinent labs within the past 24 hours have been reviewed.    Significant Imaging: I have reviewed and interpreted all pertinent imaging results/findings within the past 24 hours.

## 2020-10-31 NOTE — HOSPITAL COURSE
Mr. Berkowitz is a 46 yo male placed in observation for marijuana induced hyperemesis and ADINA. ADINA  Resolved after IVF. Nausea and vomiting improved with bowel rest and antiemetics PRN. Today patient tolerated diet and ready to go home. Encourage stop smoking marijuana/mojo. Patient stable for discharge home.

## 2020-10-31 NOTE — PLAN OF CARE
Patient discharged without being seen by case management       10/31/20 1151   Final Note   Assessment Type Final Discharge Note

## 2020-10-31 NOTE — ASSESSMENT & PLAN NOTE
"Noted on US abdomen "Echogenic lesions within the liver.  Differential considerations include hemangiomas, metastases, and primary hepatic neoplasia.  No increase in size of a previously seen lesion in the right lobe of the liver.  Additional smaller lesion seen on the current exam in the right lobe of the liver"  Will ask GI recs further images while inpatient    "

## 2020-10-31 NOTE — ASSESSMENT & PLAN NOTE
Acute on CKD. sCr today 3.3. Last normal lab was last year. Multiple admit with dehydration due to nausea/vomit this year 2/2 marijuana/mojo uses. UA with blood same as last ED visit. CT renal study last ED visit 2 days ago 10/27/20 with no evidence of nephrolithiasis or hydronephrosis.  Improving  Continue IVF until tolerate oral  Repeat labs in am

## 2020-10-31 NOTE — DISCHARGE SUMMARY
"[facility] Hospital Medicine  Discharge Summary      Patient Name: Jayson Berkowitz  MRN: [id number]  Admission Date: 10/29/2020  Hospital Length of Stay: 0 days  Discharge Date and Time:  10/31/2020 12:11 PM  Attending Physician: Norma Patel MD  Discharging Provider: Benita Pack NP  Primary Care Provider: Primary Doctor No      HPI:   This is 45 y.o male with HTN and substance abuse who presents to the hospital complaining of epigastric pain and nausea/vomiting for 1 day. Patient reports his symptoms started last night around 10 pm after he tried to quit using "mojo". His last use "mojo" was 2 days ago. He reports he has had multiple episodes of NBNB emesisand he can not keep any food or drink down. Patient states he feels weak and lightheaded.  He is concerned that he is dehydrated again. He took Zofran at home without relief. He states his abdominal pain was at top of his stomache and worsened with vomiting/heaving. He also complains of intermittent bilateral leg swelling. Patient admits he has been seen in ED multiple times for these symptoms of epigastric pain and N/V when he tried to quit "Mojo". His last ED visit was 2 days ago on 10/28/2020. He was found to have ADINA and hypokalemia and blood in urine during last visit. CT renal study with  no evidence of nephrolithiasis or hydronephrosis. He denies any fever, chest pain, SOB, cough, hematemesis, melena, dysuria, diarrhea. He denies tobacco smoking and other drug uses. He states he drinks alcohol occasionally. He request a consult for rehabilitation. He reports his uncle has stomach cancer in his 50's, no other family hx.    In ED, labs with K 3.3 and Cr 3.3; Total protein 9.1. UA with protein, blood and bacteria.  US Abdomen with echogenic lesions within the liver.    Patient is placed in Observation for further evaluation and management dehydration and ADINA.    * No surgery found *      Hospital Course:   Mr. Berkowitz is a 44 yo male " placed in observation for marijuana induced hyperemesis and ADINA. ADINA  Resolved after IVF. Nausea and vomiting improved with bowel rest and antiemetics PRN. Today patient tolerated diet and ready to go home. Encourage stop smoking marijuana/mojo. Patient stable for discharge home.     Consults:   Consults (From admission, onward)        Status Ordering Provider     Case Management/  Once     Provider:  (Not yet assigned)    Acknowledged CLAUDIA WILKES          No new Assessment & Plan notes have been filed under this hospital service since the last note was generated.  Service: Hospital Medicine    Final Active Diagnoses:    Diagnosis Date Noted POA    PRINCIPAL PROBLEM:  Acute kidney injury [N17.9] 11/21/2019 Yes    Marijuana induced hyperemisis  [R11.2] 01/31/2016 Yes    Elevated total protein [R77.8] 10/30/2020 Yes    Liver lesion [K76.9] 10/30/2020 Yes    Essential hypertension [I10] 10/13/2019 Yes     Chronic    Marijuana use [F12.90] 08/23/2019 Yes    Hypokalemia [E87.6] 04/21/2015 Yes      Problems Resolved During this Admission:       Discharged Condition: good    Disposition: Home or Self Care    Follow Up:  Follow-up Information     [facility].    Why: call at time of discharge to schedule follow up appointment post hospital discharge.  Contact information:  [address] [place] [address] [phone]                 Patient Instructions:      Diet Cardiac     Notify your health care provider if you experience any of the following:  temperature >100.4     Notify your health care provider if you experience any of the following:  difficulty breathing or increased cough     Notify your health care provider if you experience any of the following:  increased confusion or weakness     Activity as tolerated       Significant Diagnostic Studies: Labs: All labs within the past 24 hours have been reviewed    Pending Diagnostic Studies:     None          Medications:  Reconciled Home Medications:      Medication List      CONTINUE taking these medications    amLODIPine 10 MG tablet  Commonly known as: NORVASC  Take 1 tablet (10 mg total) by mouth once daily.     ondansetron 4 MG tablet  Commonly known as: ZOFRAN  Take 1 tablet (4 mg total) by mouth every 6 (six) hours.     promethazine 25 MG suppository  Commonly known as: PHENERGAN  Place 1 suppository (25 mg total) rectally every 6 (six) hours as needed for Nausea.        STOP taking these medications    potassium chloride 10% 20 mEq/15 mL oral solution  Commonly known as: SIERRA            Indwelling Lines/Drains at time of discharge:   Lines/Drains/Airways     None                 Time spent on the discharge of patient: 35 minutes  Patient was seen and examined on the date of discharge and determined to be suitable for discharge.         Benita Pack NP  Department of Hospital Medicine  [facility]

## 2020-10-31 NOTE — NURSING
Pt no complaints abdominal pain overnight, ate some niki crackers and tolerated well, asking to eat. IVF continue. Will advance diet.

## 2020-10-31 NOTE — NURSING
Discharge instructions given to patient at bedside. Patient verbalized understanding of instructions. Patient states willingness to comply. Saline lock removed. Tele monitoring removed. Pt not wanting to wait for transport escort to exit, pt ambulated off unit -steady gait noted-to family vehicle for discharge home. Patient accompanied by girlfriend. No apparent distress noted.

## 2020-10-31 NOTE — NURSING
Received report from STEPAN Cuellar RN. Pt AAOx4, RR even and unlabored, PERRL with no c/o pain. IV infusing in place to site is clear. Pt informed of md orders, oriented to environment and safety maintained with bed low side rails up x2 with nurse call bell within reach

## 2020-10-31 NOTE — PLAN OF CARE
Problem: Adult Inpatient Plan of Care  Goal: Plan of Care Review  Outcome: Ongoing, Progressing  Goal: Patient-Specific Goal (Individualization)  Outcome: Ongoing, Progressing  Goal: Absence of Hospital-Acquired Illness or Injury  Outcome: Ongoing, Progressing  Goal: Optimal Comfort and Wellbeing  Outcome: Ongoing, Progressing  Goal: Readiness for Transition of Care  Outcome: Ongoing, Progressing  Goal: Rounds/Family Conference  Outcome: Ongoing, Progressing     Problem: Electrolyte Imbalance (Acute Kidney Injury/Impairment)  Goal: Serum Electrolyte Balance  Outcome: Ongoing, Progressing     Problem: Fluid Imbalance (Acute Kidney Injury/Impairment)  Goal: Optimal Fluid Balance  Outcome: Ongoing, Progressing     Problem: Hematologic Alteration (Acute Kidney Injury/Impairment)  Goal: Hemoglobin, Hematocrit and Platelets Within Normal Range  Outcome: Ongoing, Progressing     Problem: Oral Intake Inadequate (Acute Kidney Injury/Impairment)  Goal: Optimal Nutrition Intake  Outcome: Ongoing, Progressing     Problem: Renal Function Impairment (Acute Kidney Injury/Impairment)  Goal: Effective Renal Function  Outcome: Ongoing, Progressing     Problem: Fall Injury Risk  Goal: Absence of Fall and Fall-Related Injury  Outcome: Ongoing, Progressing     Pt AAOx4 had no c/o of nausea or vomiting. Pt tolerated sandwich well w/o any complaints. Pt free of falls this shift and resting comfortably at bedside

## 2020-12-03 ENCOUNTER — HOSPITAL ENCOUNTER (EMERGENCY)
Facility: HOSPITAL | Age: 45
Discharge: HOME | End: 2020-12-04
Attending: EMERGENCY MEDICINE
Payer: COMMERCIAL

## 2020-12-03 DIAGNOSIS — R05.9 COUGH: ICD-10-CM

## 2020-12-03 DIAGNOSIS — I16.0 HYPERTENSIVE URGENCY: ICD-10-CM

## 2020-12-03 DIAGNOSIS — Z20.822 SUSPECTED COVID-19 VIRUS INFECTION: Primary | ICD-10-CM

## 2020-12-03 PROCEDURE — 99284 EMERGENCY DEPT VISIT MOD MDM: CPT | Mod: 25

## 2020-12-03 PROCEDURE — U0002 COVID-19 LAB TEST NON-CDC: HCPCS | Performed by: EMERGENCY MEDICINE

## 2020-12-03 RX ORDER — ACETAMINOPHEN 500 MG
1000 TABLET ORAL
Status: COMPLETED | OUTPATIENT
Start: 2020-12-03 | End: 2020-12-04

## 2020-12-03 RX ORDER — AMLODIPINE BESYLATE 5 MG/1
10 TABLET ORAL
Status: COMPLETED | OUTPATIENT
Start: 2020-12-03 | End: 2020-12-04

## 2020-12-04 VITALS
WEIGHT: 190 LBS | OXYGEN SATURATION: 99 % | HEART RATE: 61 BPM | BODY MASS INDEX: 24.38 KG/M2 | RESPIRATION RATE: 18 BRPM | HEIGHT: 74 IN | SYSTOLIC BLOOD PRESSURE: 164 MMHG | DIASTOLIC BLOOD PRESSURE: 101 MMHG | TEMPERATURE: 98 F

## 2020-12-04 LAB
CTP QC/QA: YES
SARS-COV-2 RDRP RESP QL NAA+PROBE: NEGATIVE

## 2020-12-04 PROCEDURE — 25000003 PHARM REV CODE 250: Performed by: EMERGENCY MEDICINE

## 2020-12-04 RX ORDER — GUAIFENESIN/DEXTROMETHORPHAN 100-10MG/5
5 SYRUP ORAL 4 TIMES DAILY PRN
Qty: 120 ML | Refills: 0 | Status: SHIPPED | OUTPATIENT
Start: 2020-12-04 | End: 2020-12-14

## 2020-12-04 RX ORDER — AMLODIPINE BESYLATE 10 MG/1
10 TABLET ORAL DAILY
Qty: 30 TABLET | Refills: 0 | Status: ON HOLD | OUTPATIENT
Start: 2020-12-04 | End: 2023-09-06 | Stop reason: HOSPADM

## 2020-12-04 RX ORDER — ALBUTEROL SULFATE 90 UG/1
1-2 AEROSOL, METERED RESPIRATORY (INHALATION) EVERY 6 HOURS PRN
Qty: 18 G | Refills: 0 | Status: ON HOLD | OUTPATIENT
Start: 2020-12-04 | End: 2023-10-11 | Stop reason: HOSPADM

## 2020-12-04 RX ADMIN — AMLODIPINE BESYLATE 10 MG: 5 TABLET ORAL at 12:12

## 2020-12-04 RX ADMIN — ACETAMINOPHEN 1000 MG: 500 TABLET ORAL at 12:12

## 2020-12-04 NOTE — ED TRIAGE NOTES
Family member in the household has tested +for COVID. Pt with complaints of body aches all over, HA, chest congestion and infrequent dry cough. Hurts to take a deep breath. Hypertensive in triage-pt is noncompliant with his b/p meds.

## 2020-12-04 NOTE — ED PROVIDER NOTES
Encounter Date: 12/3/2020       History     Chief Complaint   Patient presents with    COVID-19 Concerns     Family member in the household has tested +for COVID. Pt with complaints of body aches all over, HA, chest congestion and infrequent dry cough. Hurts to take a deep breath. Hypertensive in triage-pt is noncompliant with his b/p meds.    Headache    Hypertension     Patient presents for evaluation of URI symptoms.  Patient is having dry cough with chest pain associated with cough and breathing.  Denies shortness of breath.  He admits to body aches fatigue.  He has mild headache.  No loss of smell or taste.  Patient has been exposed to COVID by multiple family members.  Denies fever.  No nausea, vomiting, or diarrhea.  Patient has been out of his blood pressure medication.  He denies focal numbness or weakness.  He denies palpitations.        Review of patient's allergies indicates:  No Known Allergies  Past Medical History:   Diagnosis Date    Addiction to drug     Gastritis     GERD (gastroesophageal reflux disease)     Hallucination     Hypertension     Renal disorder     Sleep difficulties     Substance abuse      Past Surgical History:   Procedure Laterality Date    LEG SURGERY Right     NO PAST SURGERIES  11/21/2019     Family History   Problem Relation Age of Onset    Arthritis Mother     Hearing loss Father     Crohn's disease Sister      Social History     Tobacco Use    Smoking status: Former Smoker     Packs/day: 0.25     Years: 3.00     Pack years: 0.75    Smokeless tobacco: Never Used   Substance Use Topics    Alcohol use: Yes     Comment: socially    Drug use: Yes     Comment: shena daily     Review of Systems   Constitutional: Positive for fatigue. Negative for chills, diaphoresis and fever.   HENT: Positive for congestion. Negative for sore throat.    Eyes: Negative.  Negative for visual disturbance.   Respiratory: Positive for cough. Negative for shortness of breath.     Cardiovascular: Positive for chest pain. Negative for palpitations.   Gastrointestinal: Negative for abdominal pain, diarrhea, nausea and vomiting.        NO melena or rectal bleeding   Genitourinary: Negative for dysuria, flank pain and frequency.   Musculoskeletal: Negative for back pain.   Skin: Negative for rash.   Neurological: Positive for headaches. Negative for dizziness, weakness and numbness.        No numbness   Psychiatric/Behavioral: Negative for confusion.   All other systems reviewed and are negative.      Physical Exam     Initial Vitals [12/03/20 2324]   BP Pulse Resp Temp SpO2   (!) 203/110 79 20 98.4 °F (36.9 °C) 99 %      MAP       --         Physical Exam    Nursing note and vitals reviewed.  Constitutional: He appears well-developed and well-nourished. He is not diaphoretic. No distress.   HENT:   Head: Normocephalic and atraumatic.   Right Ear: External ear normal.   Left Ear: External ear normal.   Nose: Nose normal.   Eyes: Conjunctivae and EOM are normal. Pupils are equal, round, and reactive to light. Right eye exhibits no discharge. Left eye exhibits no discharge. No scleral icterus.   Neck: Neck supple. No tracheal deviation present.   Cardiovascular: Normal rate, regular rhythm and normal heart sounds.   No murmur heard.  Pulmonary/Chest: Breath sounds normal. No stridor. No respiratory distress. He has no wheezes. He has no rhonchi. He has no rales.   Abdominal: Soft. He exhibits no distension. There is no abdominal tenderness. There is no rebound and no guarding.   Musculoskeletal: Normal range of motion.   Neurological: He is alert and oriented to person, place, and time. GCS score is 15. GCS eye subscore is 4. GCS verbal subscore is 5. GCS motor subscore is 6.   Skin: Skin is warm and dry. No rash noted.   Psychiatric: He has a normal mood and affect. His behavior is normal. Judgment and thought content normal.         ED Course   Procedures  Labs Reviewed   SARS-COV-2 RDRP GENE           Imaging Results          X-Ray Chest 1 View (In process)                  Medical Decision Making:   Initial Assessment:   Patient presents for evaluation of possible COVID infection.  Patient has had multiple contacts edema tested positive recently.  Patient has fatigued with dry cough.  He denies shortness of breath.  Normal oxygen saturation.  No respiratory distress.  Patient is afebrile.  Blood pressure is severely elevated.  Patient has been noncompliant with his Norvasc.  Will check COVID test and chest x-ray.  Will give a dose of Norvasc and reassess blood pressure.  Differential Diagnosis:   URI, COVID, pneumonia  Clinical Tests:   Lab Tests: Ordered and Reviewed       <> Summary of Lab: COVID test negative.  Radiological Study: Ordered and Reviewed  ED Management:  0015:  COVID test negative.  Chest x-ray is unremarkable.  Suspect false negative.  Patient's white been symptomatic for 36 hr.  Patient has had multiple exposures is having symptoms classic of acute COVID infection.  Currently his symptoms are mild.  Patient may be treated outpatient.  Will provide albuterol for cough.  Will provide antitussive.  Patient instructed to take Tylenol for fever pain.  Patient instructed to isolate for minimum of 10 days.  COVID discharge instructions given.  Patient struck to return if he develops worsening shortness of breath, weakness, or for persistent fever.  Blood pressure medications been refill.  Patient will follow up primary care to recheck blood pressure in 2 weeks.                             Clinical Impression:       ICD-10-CM ICD-9-CM   1. Suspected COVID-19 virus infection  Z20.828 V01.79   2. Cough  R05 786.2   3. Hypertensive urgency  I16.0 401.9                      Disposition:   Disposition: Discharged  Condition: Stable     ED Disposition Condition    Discharge Stable        ED Prescriptions     Medication Sig Dispense Start Date End Date Auth. Provider    amLODIPine (NORVASC) 10 MG  tablet Take 1 tablet (10 mg total) by mouth once daily. 30 tablet 12/4/2020 12/4/2021 Antony Bernard MD    albuterol (PROVENTIL/VENTOLIN HFA) 90 mcg/actuation inhaler Inhale 1-2 puffs into the lungs every 6 (six) hours as needed for Wheezing. Rescue 18 g 12/4/2020  Antony Bernard MD    dextromethorphan-guaifenesin  mg/5 ml (ROBITUSSIN-DM)  mg/5 mL liquid Take 5 mLs by mouth 4 (four) times daily as needed (cough). 120 mL 12/4/2020 12/14/2020 Antony Bernard MD        Follow-up Information     Follow up With Specialties Details Why Contact Info    [facility] Call  As needed [address]  [facility] [address]  [phone]                                       Antony Bernard MD  12/04/20 0019

## 2021-02-14 ENCOUNTER — HOSPITAL ENCOUNTER (EMERGENCY)
Facility: HOSPITAL | Age: 46
Discharge: HOME | End: 2021-02-14
Attending: EMERGENCY MEDICINE
Payer: COMMERCIAL

## 2021-02-14 VITALS
SYSTOLIC BLOOD PRESSURE: 151 MMHG | WEIGHT: 200 LBS | TEMPERATURE: 98 F | HEIGHT: 74 IN | HEART RATE: 52 BPM | DIASTOLIC BLOOD PRESSURE: 87 MMHG | RESPIRATION RATE: 18 BRPM | BODY MASS INDEX: 25.67 KG/M2 | OXYGEN SATURATION: 100 %

## 2021-02-14 DIAGNOSIS — R10.31 RIGHT GROIN PAIN: Primary | ICD-10-CM

## 2021-02-14 PROCEDURE — 99284 EMERGENCY DEPT VISIT MOD MDM: CPT | Mod: 25

## 2021-02-14 RX ORDER — NAPROXEN 500 MG/1
500 TABLET ORAL EVERY 12 HOURS PRN
Qty: 20 TABLET | Refills: 0 | Status: ON HOLD | OUTPATIENT
Start: 2021-02-14 | End: 2023-09-06 | Stop reason: HOSPADM

## 2021-07-14 ENCOUNTER — OFFICE VISIT (OUTPATIENT)
Dept: FAMILY MEDICINE | Facility: CLINIC | Age: 46
End: 2021-07-14
Payer: COMMERCIAL

## 2021-07-14 VITALS
HEIGHT: 74 IN | WEIGHT: 214.81 LBS | SYSTOLIC BLOOD PRESSURE: 160 MMHG | DIASTOLIC BLOOD PRESSURE: 100 MMHG | TEMPERATURE: 98 F | HEART RATE: 63 BPM | BODY MASS INDEX: 27.57 KG/M2 | OXYGEN SATURATION: 96 %

## 2021-07-14 DIAGNOSIS — B35.6 TINEA CRURIS: ICD-10-CM

## 2021-07-14 DIAGNOSIS — I10 ESSENTIAL HYPERTENSION: Primary | ICD-10-CM

## 2021-07-14 PROBLEM — F19.94 SUBSTANCE INDUCED MOOD DISORDER: Status: RESOLVED | Noted: 2019-10-14 | Resolved: 2021-07-14

## 2021-07-14 PROBLEM — F32.A DEPRESSION: Status: RESOLVED | Noted: 2019-10-13 | Resolved: 2021-07-14

## 2021-07-14 PROBLEM — F12.90 MARIJUANA USE: Status: RESOLVED | Noted: 2019-08-23 | Resolved: 2021-07-14

## 2021-07-14 PROCEDURE — 1126F AMNT PAIN NOTED NONE PRSNT: CPT | Mod: S$GLB,,, | Performed by: INTERNAL MEDICINE

## 2021-07-14 PROCEDURE — 99999 PR PBB SHADOW E&M-EST. PATIENT-LVL III: ICD-10-PCS | Mod: PBBFAC,,, | Performed by: INTERNAL MEDICINE

## 2021-07-14 PROCEDURE — 3077F PR MOST RECENT SYSTOLIC BLOOD PRESSURE >= 140 MM HG: ICD-10-PCS | Mod: CPTII,S$GLB,, | Performed by: INTERNAL MEDICINE

## 2021-07-14 PROCEDURE — 99386 PREV VISIT NEW AGE 40-64: CPT | Mod: S$GLB,,, | Performed by: INTERNAL MEDICINE

## 2021-07-14 PROCEDURE — 99386 PR PREVENTIVE VISIT,NEW,40-64: ICD-10-PCS | Mod: S$GLB,,, | Performed by: INTERNAL MEDICINE

## 2021-07-14 PROCEDURE — 99999 PR PBB SHADOW E&M-EST. PATIENT-LVL III: CPT | Mod: PBBFAC,,, | Performed by: INTERNAL MEDICINE

## 2021-07-14 PROCEDURE — 3008F PR BODY MASS INDEX (BMI) DOCUMENTED: ICD-10-PCS | Mod: CPTII,S$GLB,, | Performed by: INTERNAL MEDICINE

## 2021-07-14 PROCEDURE — 3077F SYST BP >= 140 MM HG: CPT | Mod: CPTII,S$GLB,, | Performed by: INTERNAL MEDICINE

## 2021-07-14 PROCEDURE — 1126F PR PAIN SEVERITY QUANTIFIED, NO PAIN PRESENT: ICD-10-PCS | Mod: S$GLB,,, | Performed by: INTERNAL MEDICINE

## 2021-07-14 PROCEDURE — 3080F DIAST BP >= 90 MM HG: CPT | Mod: CPTII,S$GLB,, | Performed by: INTERNAL MEDICINE

## 2021-07-14 PROCEDURE — 3080F PR MOST RECENT DIASTOLIC BLOOD PRESSURE >= 90 MM HG: ICD-10-PCS | Mod: CPTII,S$GLB,, | Performed by: INTERNAL MEDICINE

## 2021-07-14 PROCEDURE — 3008F BODY MASS INDEX DOCD: CPT | Mod: CPTII,S$GLB,, | Performed by: INTERNAL MEDICINE

## 2021-07-14 RX ORDER — LOSARTAN POTASSIUM AND HYDROCHLOROTHIAZIDE 12.5; 5 MG/1; MG/1
1 TABLET ORAL DAILY
Qty: 90 TABLET | Refills: 3 | Status: SHIPPED | OUTPATIENT
Start: 2021-07-14 | End: 2023-01-12

## 2021-07-14 RX ORDER — KETOCONAZOLE 20 MG/G
CREAM TOPICAL 2 TIMES DAILY
Qty: 60 G | Refills: 2 | Status: SHIPPED | OUTPATIENT
Start: 2021-07-14

## 2021-10-14 ENCOUNTER — NURSE TRIAGE (OUTPATIENT)
Dept: ADMINISTRATIVE | Facility: CLINIC | Age: 46
End: 2021-10-14

## 2021-10-14 ENCOUNTER — OFFICE VISIT (OUTPATIENT)
Dept: FAMILY MEDICINE | Facility: CLINIC | Age: 46
End: 2021-10-14
Payer: COMMERCIAL

## 2021-10-14 VITALS
TEMPERATURE: 98 F | HEART RATE: 83 BPM | DIASTOLIC BLOOD PRESSURE: 86 MMHG | SYSTOLIC BLOOD PRESSURE: 160 MMHG | WEIGHT: 226.19 LBS | BODY MASS INDEX: 29.03 KG/M2 | HEIGHT: 74 IN | OXYGEN SATURATION: 97 %

## 2021-10-14 DIAGNOSIS — K21.9 GASTROESOPHAGEAL REFLUX DISEASE, UNSPECIFIED WHETHER ESOPHAGITIS PRESENT: ICD-10-CM

## 2021-10-14 DIAGNOSIS — I10 ESSENTIAL HYPERTENSION: Primary | ICD-10-CM

## 2021-10-14 DIAGNOSIS — R07.9 CHEST PAIN, UNSPECIFIED TYPE: ICD-10-CM

## 2021-10-14 DIAGNOSIS — R07.9 CHEST PAIN, UNSPECIFIED TYPE: Primary | ICD-10-CM

## 2021-10-14 DIAGNOSIS — Z13.31 POSITIVE DEPRESSION SCREENING: ICD-10-CM

## 2021-10-14 PROCEDURE — 99214 PR OFFICE/OUTPT VISIT, EST, LEVL IV, 30-39 MIN: ICD-10-PCS | Mod: S$GLB,,, | Performed by: NURSE PRACTITIONER

## 2021-10-14 PROCEDURE — 3077F SYST BP >= 140 MM HG: CPT | Mod: CPTII,S$GLB,, | Performed by: NURSE PRACTITIONER

## 2021-10-14 PROCEDURE — 99999 PR PBB SHADOW E&M-EST. PATIENT-LVL IV: CPT | Mod: PBBFAC,,, | Performed by: NURSE PRACTITIONER

## 2021-10-14 PROCEDURE — 93010 EKG 12-LEAD: ICD-10-PCS | Mod: S$GLB,,, | Performed by: INTERNAL MEDICINE

## 2021-10-14 PROCEDURE — 3077F PR MOST RECENT SYSTOLIC BLOOD PRESSURE >= 140 MM HG: ICD-10-PCS | Mod: CPTII,S$GLB,, | Performed by: NURSE PRACTITIONER

## 2021-10-14 PROCEDURE — 3079F DIAST BP 80-89 MM HG: CPT | Mod: CPTII,S$GLB,, | Performed by: NURSE PRACTITIONER

## 2021-10-14 PROCEDURE — 93005 ELECTROCARDIOGRAM TRACING: CPT | Mod: S$GLB,,, | Performed by: NURSE PRACTITIONER

## 2021-10-14 PROCEDURE — 3008F BODY MASS INDEX DOCD: CPT | Mod: CPTII,S$GLB,, | Performed by: NURSE PRACTITIONER

## 2021-10-14 PROCEDURE — 99214 OFFICE O/P EST MOD 30 MIN: CPT | Mod: S$GLB,,, | Performed by: NURSE PRACTITIONER

## 2021-10-14 PROCEDURE — 99999 PR PBB SHADOW E&M-EST. PATIENT-LVL IV: ICD-10-PCS | Mod: PBBFAC,,, | Performed by: NURSE PRACTITIONER

## 2021-10-14 PROCEDURE — 3008F PR BODY MASS INDEX (BMI) DOCUMENTED: ICD-10-PCS | Mod: CPTII,S$GLB,, | Performed by: NURSE PRACTITIONER

## 2021-10-14 PROCEDURE — 93010 ELECTROCARDIOGRAM REPORT: CPT | Mod: S$GLB,,, | Performed by: INTERNAL MEDICINE

## 2021-10-14 PROCEDURE — 93005 EKG 12-LEAD: ICD-10-PCS | Mod: S$GLB,,, | Performed by: NURSE PRACTITIONER

## 2021-10-14 PROCEDURE — 3079F PR MOST RECENT DIASTOLIC BLOOD PRESSURE 80-89 MM HG: ICD-10-PCS | Mod: CPTII,S$GLB,, | Performed by: NURSE PRACTITIONER

## 2021-10-14 RX ORDER — HYDROGEN PEROXIDE 3 %
20 SOLUTION, NON-ORAL MISCELLANEOUS
Qty: 30 CAPSULE | Refills: 2 | Status: SHIPPED | OUTPATIENT
Start: 2021-10-14 | End: 2021-12-06

## 2021-10-15 ENCOUNTER — TELEPHONE (OUTPATIENT)
Dept: FAMILY MEDICINE | Facility: CLINIC | Age: 46
End: 2021-10-15

## 2021-10-18 ENCOUNTER — TELEPHONE (OUTPATIENT)
Dept: FAMILY MEDICINE | Facility: CLINIC | Age: 46
End: 2021-10-18

## 2021-12-06 DIAGNOSIS — K21.9 GASTROESOPHAGEAL REFLUX DISEASE, UNSPECIFIED WHETHER ESOPHAGITIS PRESENT: ICD-10-CM

## 2021-12-06 RX ORDER — HYDROGEN PEROXIDE 3 %
SOLUTION, NON-ORAL MISCELLANEOUS
Qty: 90 CAPSULE | Refills: 1 | Status: SHIPPED | OUTPATIENT
Start: 2021-12-06

## 2022-06-30 DIAGNOSIS — I10 ESSENTIAL HYPERTENSION: ICD-10-CM

## 2022-06-30 NOTE — TELEPHONE ENCOUNTER
Refill Routing Note   Medication(s) are not appropriate for processing by [facility] for the following reason(s):      - Required laboratory values are outdated  - Required vitals are abnormal    [facility] action(s):  Defer          Medication reconciliation completed: No     Appointments  past 12m or future 3m with PCP    Date Provider   Last Visit   7/14/2021 Jasper Henry MD   Next Visit   Visit date not found Jasper Henry MD   ED visits in past 90 days: 0        Note composed:5:27 PM 06/30/2022

## 2022-07-01 RX ORDER — LOSARTAN POTASSIUM AND HYDROCHLOROTHIAZIDE 12.5; 5 MG/1; MG/1
TABLET ORAL
Qty: 90 TABLET | Refills: 3 | OUTPATIENT
Start: 2022-07-01

## 2022-07-08 ENCOUNTER — HOSPITAL ENCOUNTER (EMERGENCY)
Facility: HOSPITAL | Age: 47
Discharge: HOME | End: 2022-07-08
Attending: EMERGENCY MEDICINE
Payer: COMMERCIAL

## 2022-07-08 VITALS
OXYGEN SATURATION: 96 % | RESPIRATION RATE: 16 BRPM | HEART RATE: 77 BPM | TEMPERATURE: 98 F | HEIGHT: 74 IN | BODY MASS INDEX: 27.59 KG/M2 | WEIGHT: 215 LBS

## 2022-07-08 DIAGNOSIS — N45.1 EPIDIDYMITIS: Primary | ICD-10-CM

## 2022-07-08 DIAGNOSIS — N50.89 SCROTUM SWELLING: ICD-10-CM

## 2022-07-08 LAB
BACTERIA #/AREA URNS HPF: ABNORMAL /HPF
BILIRUB UR QL STRIP: NEGATIVE
CLARITY UR: CLEAR
COLOR UR: YELLOW
GLUCOSE UR QL STRIP: NEGATIVE
HGB UR QL STRIP: ABNORMAL
HYALINE CASTS #/AREA URNS LPF: 0 /LPF
KETONES UR QL STRIP: NEGATIVE
LEUKOCYTE ESTERASE UR QL STRIP: ABNORMAL
MICROSCOPIC COMMENT: ABNORMAL
NITRITE UR QL STRIP: NEGATIVE
PH UR STRIP: 7 [PH] (ref 5–8)
PROT UR QL STRIP: ABNORMAL
RBC #/AREA URNS HPF: 63 /HPF (ref 0–4)
SP GR UR STRIP: >1.03 (ref 1–1.03)
URN SPEC COLLECT METH UR: ABNORMAL
UROBILINOGEN UR STRIP-ACNC: NEGATIVE EU/DL
WBC #/AREA URNS HPF: 22 /HPF (ref 0–5)

## 2022-07-08 PROCEDURE — 87086 URINE CULTURE/COLONY COUNT: CPT | Performed by: PHYSICIAN ASSISTANT

## 2022-07-08 PROCEDURE — 25000003 PHARM REV CODE 250: Performed by: PHYSICIAN ASSISTANT

## 2022-07-08 PROCEDURE — 87491 CHLMYD TRACH DNA AMP PROBE: CPT | Performed by: PHYSICIAN ASSISTANT

## 2022-07-08 PROCEDURE — 87591 N.GONORRHOEAE DNA AMP PROB: CPT | Performed by: PHYSICIAN ASSISTANT

## 2022-07-08 PROCEDURE — 63600175 PHARM REV CODE 636 W HCPCS: Performed by: PHYSICIAN ASSISTANT

## 2022-07-08 PROCEDURE — 99285 EMERGENCY DEPT VISIT HI MDM: CPT | Mod: 25

## 2022-07-08 PROCEDURE — 81000 URINALYSIS NONAUTO W/SCOPE: CPT | Performed by: PHYSICIAN ASSISTANT

## 2022-07-08 PROCEDURE — 96372 THER/PROPH/DIAG INJ SC/IM: CPT | Performed by: PHYSICIAN ASSISTANT

## 2022-07-08 RX ORDER — DOXYCYCLINE 100 MG/1
100 CAPSULE ORAL 2 TIMES DAILY
Qty: 20 CAPSULE | Refills: 0 | Status: SHIPPED | OUTPATIENT
Start: 2022-07-08 | End: 2022-07-18

## 2022-07-08 RX ORDER — CEFTRIAXONE 250 MG/1
500 INJECTION, POWDER, FOR SOLUTION INTRAMUSCULAR; INTRAVENOUS
Status: COMPLETED | OUTPATIENT
Start: 2022-07-08 | End: 2022-07-08

## 2022-07-08 RX ORDER — LIDOCAINE HYDROCHLORIDE 10 MG/ML
10 INJECTION INFILTRATION; PERINEURAL
Status: COMPLETED | OUTPATIENT
Start: 2022-07-08 | End: 2022-07-08

## 2022-07-08 RX ORDER — DOXYCYCLINE HYCLATE 100 MG
100 TABLET ORAL
Status: COMPLETED | OUTPATIENT
Start: 2022-07-08 | End: 2022-07-08

## 2022-07-08 RX ADMIN — CEFTRIAXONE SODIUM 500 MG: 250 INJECTION, POWDER, FOR SOLUTION INTRAMUSCULAR; INTRAVENOUS at 06:07

## 2022-07-08 RX ADMIN — DOXYCYCLINE HYCLATE 100 MG: 100 TABLET, COATED ORAL at 06:07

## 2022-07-08 RX ADMIN — LIDOCAINE HYDROCHLORIDE 2.1 ML: 10 INJECTION, SOLUTION INFILTRATION; PERINEURAL at 06:07

## 2022-07-08 NOTE — ED PROVIDER NOTES
Encounter Date: 7/8/2022       History     Chief Complaint   Patient presents with    Testicle Pain     Patient reports right testicular pain x 5 days and swelling x 3-4 days. Patient denies trauma to the area. Denies hematuria but reports that there is blood in his semen.      Chief Complaint: Testicle pain  History of  Present Illness: History obtained from patient. This 46 y.o. male who has past medical history of hypertension and substance abuse presents to the ED complaining of right-sided testicle pain and swelling for 5 days.  Patient reports blood in his semen.  Denies dysuria, hematuria, urinary frequency, penile discharge, abdominal pain, nausea, vomiting, fever.  Patient is sexually active with multiple female partners.        Review of patient's allergies indicates:  No Known Allergies  Past Medical History:   Diagnosis Date    Addiction to drug     Gastritis     GERD (gastroesophageal reflux disease)     Hallucination     Hypertension     Renal disorder     Sleep difficulties     Substance abuse      Past Surgical History:   Procedure Laterality Date    LEG SURGERY Right     NO PAST SURGERIES  11/21/2019     Family History   Problem Relation Age of Onset    Arthritis Mother     Hearing loss Father     Crohn's disease Sister      Social History     Tobacco Use    Smoking status: Former Smoker     Packs/day: 0.25     Years: 3.00     Pack years: 0.75    Smokeless tobacco: Never Used   Substance Use Topics    Alcohol use: Yes     Comment: socially    Drug use: Yes     Comment: mojo daily     Review of Systems   Constitutional: Negative for chills and fever.   HENT: Negative for congestion, rhinorrhea and sore throat.    Eyes: Negative for visual disturbance.   Respiratory: Negative for cough and shortness of breath.    Cardiovascular: Negative for chest pain.   Gastrointestinal: Negative for abdominal pain, diarrhea, nausea and vomiting.   Genitourinary: Positive for scrotal swelling and  testicular pain. Negative for dysuria, frequency, genital sores, hematuria, penile discharge, penile pain and penile swelling.   Musculoskeletal: Negative for back pain.   Skin: Negative for rash.   Neurological: Negative for dizziness, weakness and headaches.       Physical Exam     Initial Vitals [07/08/22 1810]   BP Pulse Resp Temp SpO2   -- 77 16 98.1 °F (36.7 °C) 96 %      MAP       --         Physical Exam    Nursing note and vitals reviewed.  Constitutional: He appears well-developed and well-nourished. No distress.   HENT:   Head: Normocephalic and atraumatic.   Right Ear: Tympanic membrane normal.   Left Ear: Tympanic membrane normal.   Nose: Nose normal.   Mouth/Throat: Uvula is midline, oropharynx is clear and moist and mucous membranes are normal.   Eyes: EOM are normal. Pupils are equal, round, and reactive to light.   Neck: Trachea normal and phonation normal. Neck supple. No stridor present.   Normal range of motion.   Full passive range of motion without pain.     Cardiovascular: Normal rate, regular rhythm and normal heart sounds. Exam reveals no gallop and no friction rub.    No murmur heard.  Pulmonary/Chest: Effort normal and breath sounds normal. No respiratory distress. He has no wheezes. He has no rhonchi. He has no rales.   Abdominal: Abdomen is soft. Bowel sounds are normal. He exhibits no mass. There is no abdominal tenderness. Hernia confirmed negative in the right inguinal area and confirmed negative in the left inguinal area. There is no rebound and no guarding.   Genitourinary:    Penis normal.   Right testis shows swelling and tenderness. Right testis shows no mass. Left testis shows no mass, no swelling and no tenderness. Uncircumcised.    Genitourinary Comments: The right scrotum is edematous and tender to palpation.  No open wounds to the scrotum or penis.     Musculoskeletal:         General: Normal range of motion.      Cervical back: Full passive range of motion without pain,  normal range of motion and neck supple. No rigidity. No spinous process tenderness or muscular tenderness. Normal range of motion.     Lymphadenopathy: Inguinal adenopathy noted on the right side. No inguinal adenopathy noted on the left side.   Neurological: He is alert and oriented to person, place, and time. He has normal strength. No cranial nerve deficit or sensory deficit.   Skin: Skin is warm and dry. Capillary refill takes less than 2 seconds.   Psychiatric: He has a normal mood and affect.         ED Course   Procedures  Labs Reviewed   URINALYSIS, REFLEX TO URINE CULTURE - Abnormal; Notable for the following components:       Result Value    Specific Gravity, UA >1.030 (*)     Protein, UA 1+ (*)     Occult Blood UA 2+ (*)     Leukocytes, UA 2+ (*)     All other components within normal limits    Narrative:     Specimen Source->Urine   URINALYSIS MICROSCOPIC - Abnormal; Notable for the following components:    RBC, UA 63 (*)     WBC, UA 22 (*)     All other components within normal limits    Narrative:     Specimen Source->Urine   C. TRACHOMATIS/N. GONORRHOEAE BY AMP DNA   CULTURE, URINE          Imaging Results          US Scrotum And Testicles (Final result)  Result time 07/08/22 20:17:30    Final result by Debra Sorenson MD (07/08/22 20:17:30)                 Impression:      Right epididymitis.    No intratesticular masses.    Bilateral epididymal head cysts.    Bilateral hydroceles right greater than left.      Electronically signed by: Debra Sorenson  Date:    07/08/2022  Time:    20:17             Narrative:    EXAMINATION:  TESTICULAR ULTRASOUND WITH DOPPLER ANALYSIS    CLINICAL HISTORY:  Other specified disorders of the male genital organs    TECHNIQUE:  Real-time testicular ultrasound was performed. Color and pulse Doppler imaging was utilized.    COMPARISON:  02/14/2021    FINDINGS:  The right testicle is homogeneous in echotexture and normal in size measuring 4.7 x 2.7 x 2.8 cm. There are  no intratesticular masses. There is normal color flow seen to the right testicle.    The right epididymitis is greater in size than that of the left and is increased in vascularity.  There is a 4 x 2 x 3 mm right epididymal cyst.  There is a large hydrocele measuring 5.6 x 2.3 x 2.3 cm.    The left testicle is overall homogeneous in echotexture with questionably a few heterogeneous striations.  The left testicle measures 5.5 x 2.4 x 2.6 cm. There is normal color flow seen to the left testicle.  There is a small hydrocele measuring 0.9 x 1.2 x 1.1 cm.    There is a 2 x 1 x 2 mm left epididymal cyst.    Color Doppler imaging demonstrates  blood flow in both testes. Pulse imaging demonstrates arterial and venous  waveforms.                                 Medications   cefTRIAXone injection 500 mg (500 mg Intramuscular Given 7/8/22 1845)   doxycycline tablet 100 mg (100 mg Oral Given 7/8/22 1845)   LIDOcaine HCL 10 mg/ml (1%) injection 10 mL (2.1 mLs Infiltration Given 7/8/22 1845)     Medical Decision Making:   Differential Diagnosis:   UTI, STI, epididymitis, orchitis, testicular torsion  ED Management:  This is an evaluation of a 46 y.o. male who presents to the ED for right-sided testicle pain and swelling.  Vital signs are stable.   Afebrile.  Patient is nontoxic appearing and in no acute distress.     HPI and physical exam as above.  UA shows 2+ leukocytes with 22 WBCs.    Ultrasound scrotum and testicles shows right epididymitis.  Patient treated with Rocephin and doxycycline.  Encourage patient to refrain from sexual intercourse for 2 weeks.    Patient given return precautions and instructed to return to the emergency department for any new or worsening symptoms. Patient verbalized understanding and agreed with plan. Encouraged follow-up with PCP.                        Clinical Impression:   Final diagnoses:  [N50.89] Scrotum swelling  [N45.1] Epididymitis (Primary)          ED Disposition Condition     Discharge Stable        ED Prescriptions     Medication Sig Dispense Start Date End Date Auth. Provider    doxycycline (VIBRAMYCIN) 100 MG Cap Take 1 capsule (100 mg total) by mouth 2 (two) times daily. for 10 days 20 capsule 7/8/2022 7/18/2022 Tio Pack PA-C        Follow-up Information     Follow up With Specialties Details Why Contact Info    [facility] Go in 1 day If symptoms worsen [address] [facility] [id number] [phone]    [facility]  Schedule an appointment as soon as possible for a visit in 1 day For reevaluation [address] [place] [address]  [phone]             Tio Pack PA-C  07/08/22 2029

## 2022-07-08 NOTE — Clinical Note
"Jayson"Terrell Berkowitz was seen and treated in our emergency department on 7/8/2022.  He may return to work on 07/11/2022.       If you have any questions or concerns, please don't hesitate to call.      Sue Hill LPN    "

## 2022-07-09 NOTE — DISCHARGE INSTRUCTIONS
Do not have sexual intercourse for 2 weeks.  Take antibiotics as prescribed.  Return to the ER for new or worsening symptoms.

## 2022-07-10 LAB — BACTERIA UR CULT: NORMAL

## 2022-07-15 LAB
C TRACH DNA SPEC QL NAA+PROBE: NOT DETECTED
N GONORRHOEA DNA SPEC QL NAA+PROBE: NOT DETECTED

## 2022-08-10 ENCOUNTER — HOSPITAL ENCOUNTER (EMERGENCY)
Facility: HOSPITAL | Age: 47
Discharge: HOME | End: 2022-08-10
Attending: EMERGENCY MEDICINE
Payer: COMMERCIAL

## 2022-08-10 VITALS
WEIGHT: 220 LBS | SYSTOLIC BLOOD PRESSURE: 170 MMHG | RESPIRATION RATE: 18 BRPM | DIASTOLIC BLOOD PRESSURE: 84 MMHG | HEIGHT: 74 IN | HEART RATE: 70 BPM | TEMPERATURE: 98 F | BODY MASS INDEX: 28.23 KG/M2 | OXYGEN SATURATION: 97 %

## 2022-08-10 DIAGNOSIS — H57.12 LEFT EYE PAIN: ICD-10-CM

## 2022-08-10 DIAGNOSIS — S05.02XA ABRASION OF LEFT CORNEA, INITIAL ENCOUNTER: Primary | ICD-10-CM

## 2022-08-10 PROCEDURE — 25000003 PHARM REV CODE 250: Performed by: NURSE PRACTITIONER

## 2022-08-10 PROCEDURE — 99284 EMERGENCY DEPT VISIT MOD MDM: CPT | Mod: 25

## 2022-08-10 RX ORDER — MAG HYDROX/ALUMINUM HYD/SIMETH 200-200-20
30 SUSPENSION, ORAL (FINAL DOSE FORM) ORAL
Status: COMPLETED | OUTPATIENT
Start: 2022-08-10 | End: 2022-08-10

## 2022-08-10 RX ORDER — PROPARACAINE HYDROCHLORIDE 5 MG/ML
1 SOLUTION/ DROPS OPHTHALMIC
Status: COMPLETED | OUTPATIENT
Start: 2022-08-10 | End: 2022-08-10

## 2022-08-10 RX ORDER — TOBRAMYCIN 3 MG/ML
2 SOLUTION/ DROPS OPHTHALMIC EVERY 6 HOURS
Qty: 5 ML | Refills: 0 | Status: SHIPPED | OUTPATIENT
Start: 2022-08-10 | End: 2022-08-15

## 2022-08-10 RX ORDER — IBUPROFEN 600 MG/1
600 TABLET ORAL
Status: COMPLETED | OUTPATIENT
Start: 2022-08-10 | End: 2022-08-10

## 2022-08-10 RX ORDER — IBUPROFEN 600 MG/1
600 TABLET ORAL EVERY 6 HOURS PRN
Qty: 20 TABLET | Refills: 0 | Status: SHIPPED | OUTPATIENT
Start: 2022-08-10 | End: 2022-08-15

## 2022-08-10 RX ADMIN — ALUMINUM HYDROXIDE, MAGNESIUM HYDROXIDE, AND SIMETHICONE 30 ML: 200; 200; 20 SUSPENSION ORAL at 07:08

## 2022-08-10 RX ADMIN — IBUPROFEN 600 MG: 600 TABLET ORAL at 07:08

## 2022-08-10 RX ADMIN — PROPARACAINE HYDROCHLORIDE 1 DROP: 5 SOLUTION/ DROPS OPHTHALMIC at 07:08

## 2022-08-10 RX ADMIN — FLUORESCEIN SODIUM 1 EACH: 1 STRIP OPHTHALMIC at 07:08

## 2022-08-10 NOTE — Clinical Note
"Jayson"Terrell Berkowitz was seen and treated in our emergency department on 8/10/2022.  He may return to work on 08/12/2022.       If you have any questions or concerns, please don't hesitate to call.      Brina Tracey NP"

## 2022-08-10 NOTE — ED TRIAGE NOTES
Jayson Berkowitz, a 46 y.o. male presents to the ED w/ complaint of left eye pain, swelling and redness x 2 days.  Associated symptoms photophobia.  Denies any other symptoms.      Triage note:  Chief Complaint   Patient presents with    Eye Pain     Left eye pain, photophobia, scratching/ gritty feeling since yesterday.  Clear drainage.       Review of patient's allergies indicates:  No Known Allergies  Past Medical History:   Diagnosis Date    Addiction to drug     Gastritis     GERD (gastroesophageal reflux disease)     Hallucination     Hypertension     Renal disorder     Sleep difficulties     Substance abuse

## 2022-08-10 NOTE — ED PROVIDER NOTES
Encounter Date: 8/10/2022    SCRIBE #1 NOTE: I, FRANCESCO NELSON, am scribing for, and in the presence of,  Brina Tracey NP. I have scribed the following portions of the note - Other sections scribed: HPI, ROS, PE.       History     Chief Complaint   Patient presents with    Eye Pain     Left eye pain, photophobia, scratching/ gritty feeling since yesterday.  Clear drainage.       This is a 46 y.o. male patient with a past medical history of HTN who presents to the ED with a chief complaint of left, swelling eye pain onset Monday PTA. The patient endorses eye contact usage and states that after putting his contacts in, his left eye became irritated and teary. The patient states that he removed the contacts but continued to have pain and irritation. He states that he believes there is something in his eye and is also having sensitivity to light. The patient notes that he attempted to alleviate pain with a cold towel with minimal relief. He further notes that he wears eye contacts for distance vision, but he has been wearing eye glasses since the occurrence. He reports that his right eye is unaffected. No exacerbating or alleviating factors. Denies fever, chills, myalgias, purulent drainage, or other associated symptoms.    The history is provided by the patient. No  was used.     Review of patient's allergies indicates:  No Known Allergies  Past Medical History:   Diagnosis Date    Addiction to drug     Gastritis     GERD (gastroesophageal reflux disease)     Hallucination     Hypertension     Renal disorder     Sleep difficulties     Substance abuse      Past Surgical History:   Procedure Laterality Date    LEG SURGERY Right     NO PAST SURGERIES  11/21/2019     Family History   Problem Relation Age of Onset    Arthritis Mother     Hearing loss Father     Crohn's disease Sister      Social History     Tobacco Use    Smoking status: Former Smoker     Packs/day: 0.25     Years: 3.00     Pack  years: 0.75    Smokeless tobacco: Never Used   Substance Use Topics    Alcohol use: Yes     Comment: socially    Drug use: Yes     Comment: mojo daily     Review of Systems   Constitutional: Negative for chills, diaphoresis and fever.   HENT: Negative for sore throat.    Eyes: Positive for photophobia and pain. Negative for discharge.   Respiratory: Negative for shortness of breath.    Cardiovascular: Negative for chest pain.   Gastrointestinal: Negative for abdominal pain and nausea.   Genitourinary: Negative for dysuria.   Musculoskeletal: Negative for back pain and myalgias.   Skin: Negative for rash.   Neurological: Negative for weakness.       Physical Exam     Initial Vitals   BP Pulse Resp Temp SpO2   08/10/22 0639 08/10/22 0638 08/10/22 0638 08/10/22 0638 08/10/22 0638   (!) 191/117 73 18 97.6 °F (36.4 °C) 95 %      MAP       --                Physical Exam    Nursing note and vitals reviewed.  Constitutional: He appears well-developed and well-nourished. He is not diaphoretic. No distress.   Eyes: EOM and lids are normal. Pupils are equal, round, and reactive to light. Lids are everted and swept, no foreign bodies found. Left eye exhibits no discharge (No purulent drainage. ). No foreign body present in the left eye. Left conjunctiva is injected.   Fundoscopic exam:       The right eye shows red reflex.        The left eye shows no exudate, no hemorrhage and no papilledema. The left eye shows red reflex.   Slit lamp exam:       The left eye shows corneal abrasion. The left eye shows no corneal flare, no corneal ulcer, no foreign body, no hyphema and no hypopyon.   Eyelid swelling noted.   Neck:   Normal range of motion.  Pulmonary/Chest: No respiratory distress.   Musculoskeletal:      Cervical back: Normal range of motion.     Neurological: He is alert and oriented to person, place, and time. No sensory deficit.   Skin: Skin is warm and dry.   Psychiatric: He has a normal mood and affect.         ED  Course   Procedures  Labs Reviewed - No data to display       Imaging Results    None          Medications   fluorescein ophthalmic strip 1 each (1 each Both Eyes Given by Other 8/10/22 0722)   proparacaine 0.5 % ophthalmic solution 1 drop (1 drop Left Eye Given by Other 8/10/22 0722)   ibuprofen tablet 600 mg (600 mg Oral Given 8/10/22 0723)   aluminum-magnesium hydroxide-simethicone 200-200-20 mg/5 mL suspension 30 mL (30 mLs Oral Given 8/10/22 0723)     Medical Decision Making:   History:   Old Medical Records: I decided to obtain old medical records.  Differential Diagnosis:   Foreign body, blepharitis, conjunctivitis, corneal abrasion  ED Management:  This is an urgent evaluation of a 45 y/o male that presents to ED with left eye pain and swelling.  Slit exam reveals a corneal abrasion.  There are no signs of corneal ulcer, retained foreign body, rust ring, iritis, ruptured globe, or significant visual acuity deficit. Tetanus is UTD.  Will treat with tobramycin.  Advised to follow up with optho in 1-2 days for further evaluation of the injury.  Return precautions given.            Scribe Attestation:   Scribe #1: I performed the above scribed service and the documentation accurately describes the services I performed. I attest to the accuracy of the note.                 Clinical Impression:   Final diagnoses:  [S05.02XA] Abrasion of left cornea, initial encounter (Primary)  [H57.12] Left eye pain          ED Disposition Condition    Discharge Stable        ED Prescriptions     Medication Sig Dispense Start Date End Date Auth. Provider    tobramycin sulfate 0.3% (TOBREX) 0.3 % ophthalmic solution Place 2 drops into the left eye every 6 (six) hours. for 5 days 5 mL 8/10/2022 8/15/2022 Brina Tracey NP    ibuprofen (ADVIL,MOTRIN) 600 MG tablet Take 1 tablet (600 mg total) by mouth every 6 (six) hours as needed for Pain. Take with food 20 tablet 8/10/2022 8/15/2022 Brina Tracey NP        Follow-up Information     Follow  up With Specialties Details Why Contact Info    Flaco Mac MD Ophthalmology In 3 days OPTHALMOLOGY [address] [facility] [address] [phone]      [facility] Emergency Dept Emergency Medicine  As needed, If symptoms worsen [address] [place] [address] [phone]    Your eye doctor  In 3 days        Scribe attestation: I, Brina Tracey, personally performed the services described in this documentation. All medical record entries made by the scribe were at my direction and in my presence.  I have reviewed the chart and agree that the record reflects my personal performance and is accurate and complete.      Brina Tracey NP  08/10/22 0729

## 2022-09-25 DIAGNOSIS — I10 ESSENTIAL HYPERTENSION: ICD-10-CM

## 2022-09-25 NOTE — TELEPHONE ENCOUNTER
Refill Routing Note   Medication(s) are not appropriate for processing by [facility] for the following reason(s):      - Required laboratory values are outdated  - Unclear if patient follows with you     [facility] action(s):  Defer          Medication reconciliation completed: No     Appointments  past 12m or future 3m with PCP    Date Provider   Last Visit   7/14/2021 Jasper Henry MD   Next Visit   Visit date not found Jasper Henry MD   ED visits in past 90 days: 2        Note composed:2:07 PM 09/25/2022

## 2022-09-26 RX ORDER — LOSARTAN POTASSIUM AND HYDROCHLOROTHIAZIDE 12.5; 5 MG/1; MG/1
TABLET ORAL
Qty: 90 TABLET | Refills: 3 | OUTPATIENT
Start: 2022-09-26

## 2023-01-12 ENCOUNTER — TELEPHONE (OUTPATIENT)
Dept: FAMILY MEDICINE | Facility: CLINIC | Age: 48
End: 2023-01-12
Payer: COMMERCIAL

## 2023-01-12 DIAGNOSIS — I10 ESSENTIAL HYPERTENSION: ICD-10-CM

## 2023-01-12 RX ORDER — LOSARTAN POTASSIUM AND HYDROCHLOROTHIAZIDE 12.5; 5 MG/1; MG/1
TABLET ORAL
Qty: 90 TABLET | Refills: 3 | Status: ON HOLD | OUTPATIENT
Start: 2023-01-12 | End: 2023-09-06 | Stop reason: HOSPADM

## 2023-01-12 NOTE — TELEPHONE ENCOUNTER
Spoke with patient and scheduled him an appointment with Esme on 01/25/2023 at 04:30 p.m. Patient is completely out of blood pressure medication.

## 2023-01-12 NOTE — TELEPHONE ENCOUNTER
----- Message from Loreto Townsend sent at 1/12/2023  8:35 AM CST -----  Regarding: Sooner Appt Request  Who Is Calling : SUKHDEV DUQUE [[id number]]        Reason For The Call: Patient is requesting a sooner appointment.  Patient declined first available and does not want to be added to the waitlist.  Please contact the patient to schedule.        Preferred Contact Method: [phone]        Additional Information: Patient wants to be seen for high blood pressure as soon as possible

## 2023-01-12 NOTE — TELEPHONE ENCOUNTER
Refill Routing Note   Medication(s) are not appropriate for processing by [facility] for the following reason(s):      - Unclear if patient follows with you     [facility] action(s):  Defer          Medication reconciliation completed: No     Appointments  past 12m or future 3m with PCP    Date Provider   Last Visit   7/14/2021 Jasper Henry MD   Next Visit   Visit date not found Jasper Henry MD   ED visits in past 90 days: 0        Note composed:12:59 PM 01/12/2023

## 2023-01-12 NOTE — TELEPHONE ENCOUNTER
----- Message from Jessie Quintero sent at 1/12/2023 12:25 PM CST -----  Regarding: appointment concerns  Name of caller: tamir       What is the requesting detail: Patient is requesting a call back to schedule an appointment. Pt needs to be seen today or tomorrow for his blood pressure medicine.       Can the clinic reply by [facility]: no       What number to call back:[phone]

## 2023-01-18 DIAGNOSIS — I10 ESSENTIAL HYPERTENSION: ICD-10-CM

## 2023-03-05 ENCOUNTER — HOSPITAL ENCOUNTER (EMERGENCY)
Facility: HOSPITAL | Age: 48
Discharge: HOME | End: 2023-03-05
Attending: EMERGENCY MEDICINE
Payer: COMMERCIAL

## 2023-03-05 VITALS
SYSTOLIC BLOOD PRESSURE: 184 MMHG | DIASTOLIC BLOOD PRESSURE: 114 MMHG | TEMPERATURE: 99 F | OXYGEN SATURATION: 98 % | BODY MASS INDEX: 27.6 KG/M2 | WEIGHT: 215 LBS | RESPIRATION RATE: 16 BRPM | HEART RATE: 86 BPM

## 2023-03-05 DIAGNOSIS — I10 HYPERTENSION, UNSPECIFIED TYPE: ICD-10-CM

## 2023-03-05 DIAGNOSIS — J06.9 VIRAL URI WITH COUGH: Primary | ICD-10-CM

## 2023-03-05 LAB
CTP QC/QA: YES
CTP QC/QA: YES
POC MOLECULAR INFLUENZA A AGN: NEGATIVE
POC MOLECULAR INFLUENZA B AGN: NEGATIVE
SARS-COV-2 RDRP RESP QL NAA+PROBE: NEGATIVE

## 2023-03-05 PROCEDURE — 99282 EMERGENCY DEPT VISIT SF MDM: CPT | Mod: 25

## 2023-03-05 PROCEDURE — 87502 INFLUENZA DNA AMP PROBE: CPT

## 2023-03-05 NOTE — ED PROVIDER NOTES
Encounter Date: 3/5/2023    SCRIBE #1 NOTE: I, Nadya Prasad, am scribing for, and in the presence of,  Gucci Carreno MD. I have scribed the following portions of the note - Other sections scribed: HPI, ROS.     History     Chief Complaint   Patient presents with    Cough     With diarrhea and sweating since Friday     CC: cough    HPI: This is a 47 y.o.male patient, with a PMHx of Addiction to drug, Gastritis, GERD, HTN, Renal disorder, and substance abuse, presenting to the ED for further evaluation of cough. Patient reports associated symptoms of 3-4 episodes of diarrhea, rhinorrhea, congestion, subjective fever and bilateral leg cramping. Patient states he is an alcohol drinker. Patient denies any shortness of breath, chest pain, abdominal pain, headaches, ear pain, nausea, vomiting, diarrhea, dysuria, leg swelling, or any other associated symptoms. No prior Tx. No alleviating or aggravating factors. No known drug allergies.        The history is provided by the patient. No  was used.   Review of patient's allergies indicates:  No Known Allergies  Past Medical History:   Diagnosis Date    Addiction to drug     Gastritis     GERD (gastroesophageal reflux disease)     Hallucination     Hypertension     Renal disorder     Sleep difficulties     Substance abuse      Past Surgical History:   Procedure Laterality Date    LEG SURGERY Right     NO PAST SURGERIES  11/21/2019     Family History   Problem Relation Age of Onset    Arthritis Mother     Hearing loss Father     Crohn's disease Sister      Social History     Tobacco Use    Smoking status: Former     Packs/day: 0.25     Years: 3.00     Pack years: 0.75     Types: Cigarettes    Smokeless tobacco: Never   Substance Use Topics    Alcohol use: Yes     Comment: socially    Drug use: Yes     Comment: mojo daily     Review of Systems   Constitutional:  Positive for fever (subjective).   HENT:  Positive for congestion and rhinorrhea.     Respiratory:  Positive for cough. Negative for shortness of breath.    Cardiovascular:  Negative for chest pain and leg swelling.   Gastrointestinal:  Positive for diarrhea. Negative for abdominal pain, nausea and vomiting.   Genitourinary:  Negative for dysuria.   Musculoskeletal:  Negative for back pain.        (+) bilateral leg cramping   Skin:  Negative for rash.     Physical Exam     Initial Vitals [03/05/23 0631]   BP Pulse Resp Temp SpO2   (!) 188/118 82 18 99 °F (37.2 °C) 99 %      MAP       --         Physical Exam    Nursing note and vitals reviewed.  Constitutional: He appears well-developed and well-nourished.   HENT:   Head: Atraumatic.   Eyes: EOM are normal. Pupils are equal, round, and reactive to light.   Neck: Neck supple. No JVD present.   Normal range of motion.  Cardiovascular:  Normal rate, regular rhythm, normal heart sounds and intact distal pulses.     Exam reveals no gallop and no friction rub.       No murmur heard.  Pulmonary/Chest: Breath sounds normal. No respiratory distress.   Abdominal: Abdomen is soft. Bowel sounds are normal.   Musculoskeletal:         General: Normal range of motion.      Cervical back: Normal range of motion and neck supple.     Lymphadenopathy:     He has no cervical adenopathy.   Neurological: He is alert and oriented to person, place, and time. He has normal strength.   Skin: Skin is warm and dry.   Psychiatric: He has a normal mood and affect. Thought content normal.       ED Course   Procedures  Labs Reviewed   POCT INFLUENZA A/B MOLECULAR   SARS-COV-2 RDRP GENE          Imaging Results    None          Medications - No data to display  Medical Decision Making:   History:   Old Medical Records: I decided to obtain old medical records.  Clinical Tests:   Lab Tests: Ordered and Reviewed        Scribe Attestation:   Scribe #1: I performed the above scribed service and the documentation accurately describes the services I performed. I attest to the accuracy of  the note.            Viral appearing URI with asymptomatic hypertension.  Follow-up with primary care for blood pressure recheck and treatment.       Clinical Impression:   Final diagnoses:  [J06.9] Viral URI with cough (Primary)  [I10] Hypertension, unspecified type       I, Gucci Carreno, personally performed the services described in this documentation. All medical record entries made by the scribe were at my direction and in my presence. I have reviewed the chart and agree that the record reflects my personal performance and is accurate and complete.       ED Disposition Condition    Discharge Stable          ED Prescriptions    None       Follow-up Information       Follow up With Specialties Details Why Contact Info    Jasper Henry MD Internal Medicine, Pediatrics Call in 1 day for recheck this week. [address] [place]  [id number] [id number]  [phone]               Gucci Carreno MD  04/04/23 1629

## 2023-03-05 NOTE — Clinical Note
"Jayson"Terrell Berkowitz was seen and treated in our emergency department on 3/5/2023.  He may return to work on 03/06/2023.       If you have any questions or concerns, please don't hesitate to call.      Gucci Carreno MD"

## 2023-03-05 NOTE — Clinical Note
"Jayson "Terrell Berkowitz was seen and treated in our emergency department on 3/5/2023.     COVID-19 is present in our communities across the state. There is limited testing for COVID at this time, so not all patients can be tested. In this situation, your employee meets the following criteria:    Jayson Berkowitz has met the criteria for COVID-19 testing and has a NEGATIVE result. The employee can return to work once they are asymptomatic for 24 hours without the use of fever reducing medications (Tylenol, Motrin, etc).     If the employee is not fully vaccinated and had a close contact:  · Retest at 5 to 7 days post-exposure  · If possible, it is recommended that they quarantine for 5 days from the time of contact regardless of their test status.  · A mask should be worn post quarantine for 5 days.    If you have any questions or concerns, or if I can be of further assistance, please do not hesitate to contact me.    Sincerely,             Gucci Carreno MD"

## 2023-03-15 ENCOUNTER — TELEPHONE (OUTPATIENT)
Dept: FAMILY MEDICINE | Facility: CLINIC | Age: 48
End: 2023-03-15
Payer: COMMERCIAL

## 2023-05-17 ENCOUNTER — PATIENT OUTREACH (OUTPATIENT)
Dept: ADMINISTRATIVE | Facility: HOSPITAL | Age: 48
End: 2023-05-17
Payer: COMMERCIAL

## 2023-05-17 DIAGNOSIS — R73.03 PREDIABETES: Primary | ICD-10-CM

## 2023-09-04 ENCOUNTER — HOSPITAL ENCOUNTER (INPATIENT)
Facility: HOSPITAL | Age: 48
LOS: 2 days | Discharge: HOME | DRG: 684 | End: 2023-09-06
Attending: EMERGENCY MEDICINE | Admitting: EMERGENCY MEDICINE
Payer: MEDICAID

## 2023-09-04 DIAGNOSIS — N17.9 AKI (ACUTE KIDNEY INJURY): Primary | ICD-10-CM

## 2023-09-04 DIAGNOSIS — I10 HYPERTENSION, UNSPECIFIED TYPE: ICD-10-CM

## 2023-09-04 DIAGNOSIS — E87.6 HYPOKALEMIA: ICD-10-CM

## 2023-09-04 DIAGNOSIS — R11.2 NAUSEA AND VOMITING, UNSPECIFIED VOMITING TYPE: ICD-10-CM

## 2023-09-04 PROBLEM — N18.30 ACUTE RENAL FAILURE SUPERIMPOSED ON STAGE 3 CHRONIC KIDNEY DISEASE: Status: ACTIVE | Noted: 2023-09-04

## 2023-09-04 PROBLEM — N18.30 STAGE 3 CHRONIC KIDNEY DISEASE: Status: ACTIVE | Noted: 2023-09-04

## 2023-09-04 PROBLEM — F12.10 MARIJUANA ABUSE: Status: ACTIVE | Noted: 2023-09-04

## 2023-09-04 LAB
ALBUMIN SERPL BCP-MCNC: 4.7 G/DL (ref 3.5–5.2)
ALP SERPL-CCNC: 136 U/L (ref 55–135)
ALT SERPL W/O P-5'-P-CCNC: 18 U/L (ref 10–44)
AMPHET+METHAMPHET UR QL: NEGATIVE
ANION GAP SERPL CALC-SCNC: 22 MMOL/L (ref 8–16)
AST SERPL-CCNC: 25 U/L (ref 10–40)
BACTERIA #/AREA URNS HPF: ABNORMAL /HPF
BACTERIA #/AREA URNS HPF: ABNORMAL /HPF
BARBITURATES UR QL SCN>200 NG/ML: NEGATIVE
BASOPHILS # BLD AUTO: 0.04 K/UL (ref 0–0.2)
BASOPHILS NFR BLD: 0.4 % (ref 0–1.9)
BENZODIAZ UR QL SCN>200 NG/ML: NEGATIVE
BILIRUB SERPL-MCNC: 0.5 MG/DL (ref 0.1–1)
BILIRUB UR QL STRIP: NEGATIVE
BILIRUB UR QL STRIP: NEGATIVE
BUN SERPL-MCNC: 44 MG/DL (ref 6–20)
BZE UR QL SCN: NEGATIVE
CALCIUM SERPL-MCNC: 9.8 MG/DL (ref 8.7–10.5)
CANNABINOIDS UR QL SCN: NEGATIVE
CHLORIDE SERPL-SCNC: 94 MMOL/L (ref 95–110)
CK SERPL-CCNC: 368 U/L (ref 20–200)
CLARITY UR: ABNORMAL
CLARITY UR: ABNORMAL
CO2 SERPL-SCNC: 21 MMOL/L (ref 23–29)
COLOR UR: YELLOW
COLOR UR: YELLOW
CREAT SERPL-MCNC: 7.5 MG/DL (ref 0.5–1.4)
CREAT UR-MCNC: >450 MG/DL (ref 23–375)
DIFFERENTIAL METHOD: NORMAL
EOSINOPHIL # BLD AUTO: 0 K/UL (ref 0–0.5)
EOSINOPHIL NFR BLD: 0.2 % (ref 0–8)
ERYTHROCYTE [DISTWIDTH] IN BLOOD BY AUTOMATED COUNT: 14.1 % (ref 11.5–14.5)
EST. GFR  (NO RACE VARIABLE): 8 ML/MIN/1.73 M^2
ETHANOL SERPL-MCNC: <10 MG/DL
GLUCOSE SERPL-MCNC: 131 MG/DL (ref 70–110)
GLUCOSE UR QL STRIP: ABNORMAL
GLUCOSE UR QL STRIP: NEGATIVE
HCT VFR BLD AUTO: 49.2 % (ref 40–54)
HGB BLD-MCNC: 17 G/DL (ref 14–18)
HGB UR QL STRIP: ABNORMAL
HGB UR QL STRIP: ABNORMAL
HYALINE CASTS #/AREA URNS LPF: 12 /LPF
HYALINE CASTS #/AREA URNS LPF: 30 /LPF
IMM GRANULOCYTES # BLD AUTO: 0.03 K/UL (ref 0–0.04)
IMM GRANULOCYTES NFR BLD AUTO: 0.3 % (ref 0–0.5)
KETONES UR QL STRIP: ABNORMAL
KETONES UR QL STRIP: ABNORMAL
LEUKOCYTE ESTERASE UR QL STRIP: NEGATIVE
LEUKOCYTE ESTERASE UR QL STRIP: NEGATIVE
LIPASE SERPL-CCNC: 54 U/L (ref 4–60)
LYMPHOCYTES # BLD AUTO: 2.1 K/UL (ref 1–4.8)
LYMPHOCYTES NFR BLD: 22.5 % (ref 18–48)
MCH RBC QN AUTO: 29.3 PG (ref 27–31)
MCHC RBC AUTO-ENTMCNC: 34.6 G/DL (ref 32–36)
MCV RBC AUTO: 85 FL (ref 82–98)
METHADONE UR QL SCN>300 NG/ML: NEGATIVE
MICROSCOPIC COMMENT: ABNORMAL
MICROSCOPIC COMMENT: ABNORMAL
MONOCYTES # BLD AUTO: 0.9 K/UL (ref 0.3–1)
MONOCYTES NFR BLD: 9.3 % (ref 4–15)
NEUTROPHILS # BLD AUTO: 6.4 K/UL (ref 1.8–7.7)
NEUTROPHILS NFR BLD: 67.3 % (ref 38–73)
NITRITE UR QL STRIP: NEGATIVE
NITRITE UR QL STRIP: NEGATIVE
NRBC BLD-RTO: 0 /100 WBC
OPIATES UR QL SCN: ABNORMAL
PCP UR QL SCN>25 NG/ML: NEGATIVE
PH UR STRIP: 6 [PH] (ref 5–8)
PH UR STRIP: 6 [PH] (ref 5–8)
PLATELET # BLD AUTO: 351 K/UL (ref 150–450)
PMV BLD AUTO: 9.6 FL (ref 9.2–12.9)
POTASSIUM SERPL-SCNC: 2.8 MMOL/L (ref 3.5–5.1)
PROT SERPL-MCNC: 10.1 G/DL (ref 6–8.4)
PROT UR QL STRIP: ABNORMAL
PROT UR QL STRIP: ABNORMAL
PROT UR-MCNC: 148 MG/DL
PROT/CREAT UR: ABNORMAL MG/G{CREAT} (ref 0–0.2)
RBC # BLD AUTO: 5.81 M/UL (ref 4.6–6.2)
RBC #/AREA URNS HPF: 45 /HPF (ref 0–4)
RBC #/AREA URNS HPF: 7 /HPF (ref 0–4)
SODIUM SERPL-SCNC: 137 MMOL/L (ref 136–145)
SODIUM UR-SCNC: 31 MMOL/L (ref 20–250)
SP GR UR STRIP: 1.02 (ref 1–1.03)
SP GR UR STRIP: 1.02 (ref 1–1.03)
SQUAMOUS #/AREA URNS HPF: 2 /HPF
TOXICOLOGY INFORMATION: ABNORMAL
URN SPEC COLLECT METH UR: ABNORMAL
URN SPEC COLLECT METH UR: ABNORMAL
UROBILINOGEN UR STRIP-ACNC: ABNORMAL EU/DL
UROBILINOGEN UR STRIP-ACNC: ABNORMAL EU/DL
WBC # BLD AUTO: 9.52 K/UL (ref 3.9–12.7)
WBC #/AREA URNS HPF: 0 /HPF (ref 0–5)
WBC #/AREA URNS HPF: 4 /HPF (ref 0–5)
WBC CLUMPS URNS QL MICRO: ABNORMAL

## 2023-09-04 PROCEDURE — 81000 URINALYSIS NONAUTO W/SCOPE: CPT | Mod: 91,59 | Performed by: EMERGENCY MEDICINE

## 2023-09-04 PROCEDURE — 80307 DRUG TEST PRSMV CHEM ANLYZR: CPT | Performed by: EMERGENCY MEDICINE

## 2023-09-04 PROCEDURE — 85025 COMPLETE CBC W/AUTO DIFF WBC: CPT | Performed by: EMERGENCY MEDICINE

## 2023-09-04 PROCEDURE — 86160 COMPLEMENT ANTIGEN: CPT | Mod: 59 | Performed by: INTERNAL MEDICINE

## 2023-09-04 PROCEDURE — 86038 ANTINUCLEAR ANTIBODIES: CPT | Performed by: INTERNAL MEDICINE

## 2023-09-04 PROCEDURE — 25000003 PHARM REV CODE 250: Performed by: HOSPITALIST

## 2023-09-04 PROCEDURE — 84300 ASSAY OF URINE SODIUM: CPT | Performed by: INTERNAL MEDICINE

## 2023-09-04 PROCEDURE — 80074 ACUTE HEPATITIS PANEL: CPT | Performed by: INTERNAL MEDICINE

## 2023-09-04 PROCEDURE — 82550 ASSAY OF CK (CPK): CPT | Performed by: EMERGENCY MEDICINE

## 2023-09-04 PROCEDURE — 63600175 PHARM REV CODE 636 W HCPCS: Performed by: HOSPITALIST

## 2023-09-04 PROCEDURE — 81000 URINALYSIS NONAUTO W/SCOPE: CPT | Mod: 59 | Performed by: INTERNAL MEDICINE

## 2023-09-04 PROCEDURE — 83690 ASSAY OF LIPASE: CPT | Performed by: EMERGENCY MEDICINE

## 2023-09-04 PROCEDURE — 80053 COMPREHEN METABOLIC PANEL: CPT | Performed by: EMERGENCY MEDICINE

## 2023-09-04 PROCEDURE — 96365 THER/PROPH/DIAG IV INF INIT: CPT

## 2023-09-04 PROCEDURE — 86160 COMPLEMENT ANTIGEN: CPT | Performed by: INTERNAL MEDICINE

## 2023-09-04 PROCEDURE — 25000003 PHARM REV CODE 250: Performed by: EMERGENCY MEDICINE

## 2023-09-04 PROCEDURE — 96366 THER/PROPH/DIAG IV INF ADDON: CPT

## 2023-09-04 PROCEDURE — 99285 EMERGENCY DEPT VISIT HI MDM: CPT | Mod: 25

## 2023-09-04 PROCEDURE — 84156 ASSAY OF PROTEIN URINE: CPT | Performed by: INTERNAL MEDICINE

## 2023-09-04 PROCEDURE — 82077 ASSAY SPEC XCP UR&BREATH IA: CPT | Performed by: EMERGENCY MEDICINE

## 2023-09-04 PROCEDURE — 21400001 HC TELEMETRY ROOM

## 2023-09-04 PROCEDURE — 96375 TX/PRO/DX INJ NEW DRUG ADDON: CPT

## 2023-09-04 PROCEDURE — 36415 COLL VENOUS BLD VENIPUNCTURE: CPT | Performed by: INTERNAL MEDICINE

## 2023-09-04 PROCEDURE — 63600175 PHARM REV CODE 636 W HCPCS: Performed by: EMERGENCY MEDICINE

## 2023-09-04 PROCEDURE — 96361 HYDRATE IV INFUSION ADD-ON: CPT

## 2023-09-04 RX ORDER — AMLODIPINE BESYLATE 5 MG/1
10 TABLET ORAL DAILY
Status: DISCONTINUED | OUTPATIENT
Start: 2023-09-04 | End: 2023-09-06 | Stop reason: HOSPADM

## 2023-09-04 RX ORDER — CLONIDINE HYDROCHLORIDE 0.1 MG/1
0.2 TABLET ORAL EVERY 4 HOURS PRN
Status: DISCONTINUED | OUTPATIENT
Start: 2023-09-04 | End: 2023-09-06 | Stop reason: HOSPADM

## 2023-09-04 RX ORDER — POTASSIUM CHLORIDE 20 MEQ/1
40 TABLET, EXTENDED RELEASE ORAL ONCE
Status: COMPLETED | OUTPATIENT
Start: 2023-09-04 | End: 2023-09-04

## 2023-09-04 RX ORDER — SODIUM CHLORIDE 9 MG/ML
INJECTION, SOLUTION INTRAVENOUS CONTINUOUS
Status: DISCONTINUED | OUTPATIENT
Start: 2023-09-04 | End: 2023-09-05

## 2023-09-04 RX ORDER — MORPHINE SULFATE 4 MG/ML
4 INJECTION, SOLUTION INTRAMUSCULAR; INTRAVENOUS
Status: COMPLETED | OUTPATIENT
Start: 2023-09-04 | End: 2023-09-04

## 2023-09-04 RX ORDER — SODIUM CHLORIDE 9 MG/ML
1000 INJECTION, SOLUTION INTRAVENOUS
Status: ACTIVE | OUTPATIENT
Start: 2023-09-04 | End: 2023-09-05

## 2023-09-04 RX ORDER — HYDRALAZINE HYDROCHLORIDE 25 MG/1
50 TABLET, FILM COATED ORAL EVERY 8 HOURS
Status: DISCONTINUED | OUTPATIENT
Start: 2023-09-04 | End: 2023-09-06

## 2023-09-04 RX ORDER — ONDANSETRON 2 MG/ML
4 INJECTION INTRAMUSCULAR; INTRAVENOUS EVERY 4 HOURS PRN
Status: DISCONTINUED | OUTPATIENT
Start: 2023-09-04 | End: 2023-09-06 | Stop reason: HOSPADM

## 2023-09-04 RX ORDER — ONDANSETRON 2 MG/ML
4 INJECTION INTRAMUSCULAR; INTRAVENOUS
Status: COMPLETED | OUTPATIENT
Start: 2023-09-04 | End: 2023-09-04

## 2023-09-04 RX ADMIN — AMLODIPINE BESYLATE 10 MG: 5 TABLET ORAL at 02:09

## 2023-09-04 RX ADMIN — HYDRALAZINE HYDROCHLORIDE 50 MG: 25 TABLET, FILM COATED ORAL at 09:09

## 2023-09-04 RX ADMIN — HYDRALAZINE HYDROCHLORIDE 50 MG: 25 TABLET, FILM COATED ORAL at 02:09

## 2023-09-04 RX ADMIN — SODIUM CHLORIDE 1000 ML: 9 INJECTION, SOLUTION INTRAVENOUS at 11:09

## 2023-09-04 RX ADMIN — SODIUM CHLORIDE: 9 INJECTION, SOLUTION INTRAVENOUS at 05:09

## 2023-09-04 RX ADMIN — CLONIDINE HYDROCHLORIDE 0.2 MG: 0.1 TABLET ORAL at 05:09

## 2023-09-04 RX ADMIN — SODIUM CHLORIDE: 9 INJECTION, SOLUTION INTRAVENOUS at 02:09

## 2023-09-04 RX ADMIN — ONDANSETRON 4 MG: 2 INJECTION INTRAMUSCULAR; INTRAVENOUS at 11:09

## 2023-09-04 RX ADMIN — POTASSIUM CHLORIDE 40 MEQ: 1500 TABLET, EXTENDED RELEASE ORAL at 02:09

## 2023-09-04 RX ADMIN — MORPHINE SULFATE 4 MG: 4 INJECTION, SOLUTION INTRAMUSCULAR; INTRAVENOUS at 11:09

## 2023-09-04 RX ADMIN — ONDANSETRON 4 MG: 2 INJECTION INTRAMUSCULAR; INTRAVENOUS at 05:09

## 2023-09-04 RX ADMIN — PROMETHAZINE HYDROCHLORIDE 12.5 MG: 25 INJECTION INTRAMUSCULAR; INTRAVENOUS at 01:09

## 2023-09-04 RX ADMIN — ONDANSETRON 4 MG: 2 INJECTION INTRAMUSCULAR; INTRAVENOUS at 09:09

## 2023-09-04 NOTE — SUBJECTIVE & OBJECTIVE
Past Medical History:   Diagnosis Date    Addiction to drug     Gastritis     GERD (gastroesophageal reflux disease)     Hallucination     Hypertension     Renal disorder     Sleep difficulties     Substance abuse        Past Surgical History:   Procedure Laterality Date    LEG SURGERY Right     NO PAST SURGERIES  11/21/2019       Review of patient's allergies indicates:  No Known Allergies    No current facility-administered medications on file prior to encounter.     Current Outpatient Medications on File Prior to Encounter   Medication Sig    albuterol (PROVENTIL/VENTOLIN HFA) 90 mcg/actuation inhaler Inhale 1-2 puffs into the lungs every 6 (six) hours as needed for Wheezing. Rescue (Patient not taking: No sig reported)    amLODIPine (NORVASC) 10 MG tablet Take 1 tablet (10 mg total) by mouth once daily. (Patient not taking: Reported on 7/14/2021)    esomeprazole (NEXIUM) 20 MG capsule TAKE 1 CAPSULE BY MOUTH EVERY DAY BEFORE BREAKFAST    ketoconazole (NIZORAL) 2 % cream Apply topically 2 (two) times daily. Apply to affected areas of skin for tinea cruris    losartan-hydrochlorothiazide 50-12.5 mg (HYZAAR) 50-12.5 mg per tablet TAKE 1 TABLET BY MOUTH EVERY DAY    naproxen (NAPROSYN) 500 MG tablet Take 1 tablet (500 mg total) by mouth every 12 (twelve) hours as needed (Pain). (Patient not taking: No sig reported)    ondansetron (ZOFRAN) 4 MG tablet Take 1 tablet (4 mg total) by mouth every 6 (six) hours. (Patient not taking: No sig reported)    promethazine (PHENERGAN) 25 MG suppository Place 1 suppository (25 mg total) rectally every 6 (six) hours as needed for Nausea. (Patient not taking: No sig reported)     Family History       Problem Relation (Age of Onset)    Arthritis Mother    Crohn's disease Sister    Hearing loss Father          Tobacco Use    Smoking status: Former     Current packs/day: 0.25     Average packs/day: 0.3 packs/day for 3.0 years (0.8 ttl pk-yrs)     Types: Cigarettes    Smokeless tobacco:  Never   Substance and Sexual Activity    Alcohol use: Yes     Comment: socially    Drug use: Yes     Types: Marijuana     Comment: mojo daily    Sexual activity: Yes     Partners: Female     Birth control/protection: None     Review of Systems   Constitutional:  Positive for activity change and appetite change.   HENT:  Negative for congestion and dental problem.    Eyes:  Negative for discharge.   Respiratory:  Negative for apnea and chest tightness.    Cardiovascular:  Negative for chest pain.   Gastrointestinal:  Negative for abdominal distention and abdominal pain.   Endocrine: Negative for cold intolerance and heat intolerance.   Genitourinary:  Positive for difficulty urinating and flank pain. Negative for dysuria.   Musculoskeletal:  Negative for arthralgias and back pain.   Allergic/Immunologic: Negative for environmental allergies.   Neurological:  Negative for dizziness and facial asymmetry.   Hematological:  Negative for adenopathy. Does not bruise/bleed easily.   Psychiatric/Behavioral:  Negative for agitation and behavioral problems.      Objective:     Vital Signs (Most Recent):  Temp: 97.9 °F (36.6 °C) (09/04/23 1230)  Pulse: 83 (09/04/23 1302)  Resp: 18 (09/04/23 1302)  BP: (!) 161/102 (09/04/23 1302)  SpO2: 99 % (09/04/23 1302) Vital Signs (24h Range):  Temp:  [97.9 °F (36.6 °C)-98.2 °F (36.8 °C)] 97.9 °F (36.6 °C)  Pulse:  [83-99] 83  Resp:  [16-18] 18  SpO2:  [98 %-99 %] 99 %  BP: (132-173)/() 161/102     Weight: 79.4 kg (175 lb)  Body mass index is 22.47 kg/m².     Physical Exam  HENT:      Head: Normocephalic and atraumatic.      Nose: Nose normal. No congestion.      Mouth/Throat:      Mouth: Mucous membranes are dry.      Pharynx: No oropharyngeal exudate.   Eyes:      Extraocular Movements: Extraocular movements intact.      Pupils: Pupils are equal, round, and reactive to light.   Cardiovascular:      Rate and Rhythm: Normal rate.      Heart sounds: No murmur heard.  Pulmonary:       Effort: Pulmonary effort is normal.      Breath sounds: Normal breath sounds.   Abdominal:      General: There is no distension.      Tenderness: There is no abdominal tenderness.   Musculoskeletal:         General: No swelling or deformity. Normal range of motion.      Cervical back: Normal range of motion and neck supple.   Skin:     Coloration: Skin is not jaundiced.      Findings: No bruising.   Neurological:      Mental Status: He is alert and oriented to person, place, and time.      Cranial Nerves: No cranial nerve deficit.      Motor: No weakness.   Psychiatric:         Mood and Affect: Mood normal.         Behavior: Behavior normal.              CRANIAL NERVES     CN III, IV, VI   Pupils are equal, round, and reactive to light.       Significant Labs: All pertinent labs within the past 24 hours have been reviewed.  BMP:   Recent Labs   Lab 09/04/23  1108   *      K 2.8*   CL 94*   CO2 21*   BUN 44*   CREATININE 7.5*   CALCIUM 9.8     CBC:   Recent Labs   Lab 09/04/23  1108   WBC 9.52   HGB 17.0   HCT 49.2        CMP:   Recent Labs   Lab 09/04/23  1108      K 2.8*   CL 94*   CO2 21*   *   BUN 44*   CREATININE 7.5*   CALCIUM 9.8   PROT 10.1*   ALBUMIN 4.7   BILITOT 0.5   ALKPHOS 136*   AST 25   ALT 18   ANIONGAP 22*       Significant Imaging: I have reviewed all pertinent imaging results/findings within the past 24 hours.

## 2023-09-04 NOTE — ED NOTES
47 yo male presents to the ED via EMS with c/o N/V, abdominal pain, flank pain, and SOB x 4 days. Patient also c/o decreased urination. Patient endorses smoking mojo for the last 4. Patient is AAOx4, VSS, NAD. Denies CP, cough, fever, numbness, or tingling. Bed locked, in lowest position, bed rails up x2, call light in reach, all monitoring attached.

## 2023-09-04 NOTE — HPI
"This is a 48 y.o. M who has a PMHx of Addiction to drug, Gastritis, GERD, HTN, and Renal disorder who presents to the ED for emergent evaluation of acute left lower back pain, and right sided back pain that began 4 days ago. Pt also complains of nausea, vomiting, an epigastric abdominal pain since smoking "mojo" marijuana 4 days ago. Pt states that he was once "addicted to mojo" 3-4 years ago. He smoked mojo for the first time in 3-4 years 4 days ago. He attributes the abdominal pain to vomiting. He endorses occasional subjective fever that he attributes to mojo. Pt also reports oliguria for the last 4 days. Pt has HTN, but states that he ran out of Amlodipine 1 month ago. He also reports running out of potassium. He has not been taking any prescribed medications. Pt denies dysuria, or diarrhea.patient has Cr. Of 7.5,baseline about 2.5.,bladder scan show no sign of retention,he received 2 liter bolus of fluid and has been started on IVF,nephrology is consulted,ordered CT renal.  "

## 2023-09-04 NOTE — H&P
"[facility]  [facility] Medicine  History & Physical    Patient Name: Jayson Berkowitz  MRN: [id number]  Patient Class: IP- Inpatient  Admission Date: 9/4/2023  Attending Physician: Allie Tovar, *   Primary Care Provider: Jasper Henry MD         Patient information was obtained from patient and ER records.     Subjective:     Principal Problem:Acute renal failure superimposed on stage 3 chronic kidney disease    Chief Complaint:   Chief Complaint   Patient presents with    Vomiting     Pt BIB [facility] with complaints of N/V, left flank and right lumbar back pain accompanied by oliguria x4 days. Pt reports smoking mojo 4 days ago and that's when his symptoms started. Pt also reporting hasnt had a bowel movement in 5 days. Denies abdominal or chest pain.     Flank Pain        HPI: This is a 48 y.o. M who has a PMHx of Addiction to drug, Gastritis, GERD, HTN, and Renal disorder who presents to the ED for emergent evaluation of acute left lower back pain, and right sided back pain that began 4 days ago. Pt also complains of nausea, vomiting, an epigastric abdominal pain since smoking "mojo" marijuana 4 days ago. Pt states that he was once "addicted to mojo" 3-4 years ago. He smoked mojo for the first time in 3-4 years 4 days ago. He attributes the abdominal pain to vomiting. He endorses occasional subjective fever that he attributes to mojo. Pt also reports oliguria for the last 4 days. Pt has HTN, but states that he ran out of Amlodipine 1 month ago. He also reports running out of potassium. He has not been taking any prescribed medications. Pt denies dysuria, or diarrhea.patient has Cr. Of 7.5,baseline about 2.5.,bladder scan show no sign of retention,he received 2 liter bolus of fluid and has been started on IVF,nephrology is consulted,ordered CT renal.      Past Medical History:   Diagnosis Date    Addiction to drug     Gastritis     GERD (gastroesophageal reflux disease)     " Hallucination     Hypertension     Renal disorder     Sleep difficulties     Substance abuse        Past Surgical History:   Procedure Laterality Date    LEG SURGERY Right     NO PAST SURGERIES  11/21/2019       Review of patient's allergies indicates:  No Known Allergies    No current facility-administered medications on file prior to encounter.     Current Outpatient Medications on File Prior to Encounter   Medication Sig    albuterol (PROVENTIL/VENTOLIN HFA) 90 mcg/actuation inhaler Inhale 1-2 puffs into the lungs every 6 (six) hours as needed for Wheezing. Rescue (Patient not taking: No sig reported)    amLODIPine (NORVASC) 10 MG tablet Take 1 tablet (10 mg total) by mouth once daily. (Patient not taking: Reported on 7/14/2021)    esomeprazole (NEXIUM) 20 MG capsule TAKE 1 CAPSULE BY MOUTH EVERY DAY BEFORE BREAKFAST    ketoconazole (NIZORAL) 2 % cream Apply topically 2 (two) times daily. Apply to affected areas of skin for tinea cruris    losartan-hydrochlorothiazide 50-12.5 mg (HYZAAR) 50-12.5 mg per tablet TAKE 1 TABLET BY MOUTH EVERY DAY    naproxen (NAPROSYN) 500 MG tablet Take 1 tablet (500 mg total) by mouth every 12 (twelve) hours as needed (Pain). (Patient not taking: No sig reported)    ondansetron (ZOFRAN) 4 MG tablet Take 1 tablet (4 mg total) by mouth every 6 (six) hours. (Patient not taking: No sig reported)    promethazine (PHENERGAN) 25 MG suppository Place 1 suppository (25 mg total) rectally every 6 (six) hours as needed for Nausea. (Patient not taking: No sig reported)     Family History       Problem Relation (Age of Onset)    Arthritis Mother    Crohn's disease Sister    Hearing loss Father          Tobacco Use    Smoking status: Former     Current packs/day: 0.25     Average packs/day: 0.3 packs/day for 3.0 years (0.8 ttl pk-yrs)     Types: Cigarettes    Smokeless tobacco: Never   Substance and Sexual Activity    Alcohol use: Yes     Comment: socially    Drug use: Yes      Types: Marijuana     Comment: mojo daily    Sexual activity: Yes     Partners: Female     Birth control/protection: None     Review of Systems   Constitutional:  Positive for activity change and appetite change.   HENT:  Negative for congestion and dental problem.    Eyes:  Negative for discharge.   Respiratory:  Negative for apnea and chest tightness.    Cardiovascular:  Negative for chest pain.   Gastrointestinal:  Negative for abdominal distention and abdominal pain.   Endocrine: Negative for cold intolerance and heat intolerance.   Genitourinary:  Positive for difficulty urinating and flank pain. Negative for dysuria.   Musculoskeletal:  Negative for arthralgias and back pain.   Allergic/Immunologic: Negative for environmental allergies.   Neurological:  Negative for dizziness and facial asymmetry.   Hematological:  Negative for adenopathy. Does not bruise/bleed easily.   Psychiatric/Behavioral:  Negative for agitation and behavioral problems.      Objective:     Vital Signs (Most Recent):  Temp: 97.9 °F (36.6 °C) (09/04/23 1230)  Pulse: 83 (09/04/23 1302)  Resp: 18 (09/04/23 1302)  BP: (!) 161/102 (09/04/23 1302)  SpO2: 99 % (09/04/23 1302) Vital Signs (24h Range):  Temp:  [97.9 °F (36.6 °C)-98.2 °F (36.8 °C)] 97.9 °F (36.6 °C)  Pulse:  [83-99] 83  Resp:  [16-18] 18  SpO2:  [98 %-99 %] 99 %  BP: (132-173)/() 161/102     Weight: 79.4 kg (175 lb)  Body mass index is 22.47 kg/m².     Physical Exam  HENT:      Head: Normocephalic and atraumatic.      Nose: Nose normal. No congestion.      Mouth/Throat:      Mouth: Mucous membranes are dry.      Pharynx: No oropharyngeal exudate.   Eyes:      Extraocular Movements: Extraocular movements intact.      Pupils: Pupils are equal, round, and reactive to light.   Cardiovascular:      Rate and Rhythm: Normal rate.      Heart sounds: No murmur heard.  Pulmonary:      Effort: Pulmonary effort is normal.      Breath sounds: Normal breath sounds.   Abdominal:       General: There is no distension.      Tenderness: There is no abdominal tenderness.   Musculoskeletal:         General: No swelling or deformity. Normal range of motion.      Cervical back: Normal range of motion and neck supple.   Skin:     Coloration: Skin is not jaundiced.      Findings: No bruising.   Neurological:      Mental Status: He is alert and oriented to person, place, and time.      Cranial Nerves: No cranial nerve deficit.      Motor: No weakness.   Psychiatric:         Mood and Affect: Mood normal.         Behavior: Behavior normal.              CRANIAL NERVES     CN III, IV, VI   Pupils are equal, round, and reactive to light.       Significant Labs: All pertinent labs within the past 24 hours have been reviewed.  BMP:   Recent Labs   Lab 09/04/23  1108   *      K 2.8*   CL 94*   CO2 21*   BUN 44*   CREATININE 7.5*   CALCIUM 9.8     CBC:   Recent Labs   Lab 09/04/23  1108   WBC 9.52   HGB 17.0   HCT 49.2        CMP:   Recent Labs   Lab 09/04/23  1108      K 2.8*   CL 94*   CO2 21*   *   BUN 44*   CREATININE 7.5*   CALCIUM 9.8   PROT 10.1*   ALBUMIN 4.7   BILITOT 0.5   ALKPHOS 136*   AST 25   ALT 18   ANIONGAP 22*       Significant Imaging: I have reviewed all pertinent imaging results/findings within the past 24 hours.    Assessment/Plan:     * Acute renal failure superimposed on stage 3 chronic kidney disease  Patient with acute kidney injury/acute renal failure likely due to pre-renal azotemia due to dehydration ADINA is currently worsening. Baseline creatinine 2.5 - Labs reviewed- Renal function/electrolytes with Estimated Creatinine Clearance: 13.8 mL/min (A) (based on SCr of 7.5 mg/dL (H)). according to latest data. Monitor urine output and serial BMP and adjust therapy as needed. Avoid nephrotoxins and renally dose meds for GFR listed above.  patient has Cr. Of 7.5,baseline about 2.5.,bladder scan show no sign of retention,he received 2 liter bolus of fluid and  has been started on IVF,nephrology is consulted,ordered CT renal.    Marijuana abuse  Consulted avoid illicit drug use.      Hypokalemia  Replaced.      Stage 3 chronic kidney disease  Baseline Cr. Is about 2.5,will monitor.      Essential hypertension  Stared on Norvasc,hydraalzine,prn clonidine.      Tobacco abuse  Consulted over 6 minutes need quit smoking.        VTE Risk Mitigation (From admission, onward)    None                     Allie Tovar MD  Department of Hospital Medicine  [facility] [place] [facility] [place] [facility]

## 2023-09-04 NOTE — ED PROVIDER NOTES
"Encounter Date: 9/4/2023    SCRIBE #1 NOTE: I, Danicabar Garcia, am scribing for, and in the presence of,  Angelita Ordonez MD. Other sections scribed: HPI, ROS, PE.       History     Chief Complaint   Patient presents with    Vomiting     Pt BIB [facility] with complaints of N/V, left flank and right lumbar back pain accompanied by oliguria x4 days. Pt reports smoking mojo 4 days ago and that's when his symptoms started. Pt also reporting hasnt had a bowel movement in 5 days. Denies abdominal or chest pain.     Flank Pain     CC: Back pain    HPI: History is obtained from independent historian. This is a 48 y.o. M who has a PMHx of Addiction to drug, Gastritis, GERD, HTN, and Renal disorder who presents to the ED for emergent evaluation of acute left lower back pain, and right sided back pain that began 4 days ago. Pt also complains of nausea, vomiting, an epigastric abdominal pain since smoking "mojo" marijuana 4 days ago. Pt states that he was once "addicted to mojo" 3-4 years ago. He smoked mojo for the first time in 3-4 years 4 days ago. He attributes the abdominal pain to vomiting. He endorses occasional subjective fever that he attributes to mojo. Pt also reports oliguria for the last 4 days. Pt has HTN, but states that he ran out of Amlodipine 1 month ago. He also reports running out of potassium. He has not been taking any prescribed medications. Pt denies dysuria, or diarrhea.    The history is provided by the patient. No  was used.     Review of patient's allergies indicates:  No Known Allergies  Past Medical History:   Diagnosis Date    Addiction to drug     Gastritis     GERD (gastroesophageal reflux disease)     Hallucination     Hypertension     Renal disorder     Sleep difficulties     Substance abuse      Past Surgical History:   Procedure Laterality Date    LEG SURGERY Right     NO PAST SURGERIES  11/21/2019     Family History   Problem Relation Age of Onset    Arthritis Mother     " Hearing loss Father     Crohn's disease Sister      Social History     Tobacco Use    Smoking status: Former     Current packs/day: 0.25     Average packs/day: 0.3 packs/day for 3.0 years (0.8 ttl pk-yrs)     Types: Cigarettes    Smokeless tobacco: Never   Substance Use Topics    Alcohol use: Yes     Comment: socially    Drug use: Yes     Comment: shena daily     Review of Systems   Constitutional:  Positive for fever (subjective). Negative for chills.   HENT:  Negative for congestion and sore throat.    Eyes:  Negative for visual disturbance.   Respiratory:  Negative for cough and shortness of breath.    Cardiovascular:  Negative for chest pain.   Gastrointestinal:  Positive for abdominal pain (Attributes to vomiting), nausea and vomiting. Negative for diarrhea.   Genitourinary:  Positive for difficulty urinating. Negative for dysuria.        (+) Oliguria   Musculoskeletal:  Positive for back pain.   Skin:  Negative for rash.   Neurological:  Negative for headaches.   Psychiatric/Behavioral:  Negative for confusion.        Physical Exam     Initial Vitals [09/04/23 1031]   BP Pulse Resp Temp SpO2   (!) 132/94 99 18 98.2 °F (36.8 °C) 98 %      MAP       --         Physical Exam    Nursing note and vitals reviewed.  Constitutional: He appears well-developed and well-nourished. He is not diaphoretic. No distress.   HENT:   Head: Normocephalic and atraumatic.   Mouth/Throat: Oropharynx is clear and moist.   Eyes: EOM are normal. Pupils are equal, round, and reactive to light.   Neck: Neck supple.   Cardiovascular:  Normal rate and regular rhythm.           Pulmonary/Chest: Breath sounds normal. No respiratory distress.   Abdominal: Abdomen is soft. Bowel sounds are normal. There is abdominal tenderness in the epigastric area.   Musculoskeletal:         General: No edema.      Cervical back: Neck supple.      Comments: Tenderness to the right paraspinal muscle of back and left lower paraspinal muscles.     Neurological:  He is alert and oriented to person, place, and time.   Skin: Skin is warm and dry.   Psychiatric: His mood appears anxious.         ED Course   Critical Care    Date/Time: 9/4/2023 12:35 PM    Performed by: Angelita Ordonez MD  Authorized by: Angelita Ordonez MD  Total critical care time (exclusive of procedural time) : 0 minutes  Comments: Please put in 40 minutes of critical care due to patient having a high risk of renal failure.   Separate from teaching and exclusive of procedure and ekg time  Includes:  Time at bedside  Time reviewing test results  Time discussing case with staff  Time documenting the medical record  Time spent with family members  Time spent with consults  Management           Labs Reviewed   COMPREHENSIVE METABOLIC PANEL - Abnormal; Notable for the following components:       Result Value    Potassium 2.8 (*)     Chloride 94 (*)     CO2 21 (*)     Glucose 131 (*)     BUN 44 (*)     Creatinine 7.5 (*)     Total Protein 10.1 (*)     Alkaline Phosphatase 136 (*)     eGFR 8 (*)     Anion Gap 22 (*)     All other components within normal limits   CK - Abnormal; Notable for the following components:     (*)     All other components within normal limits   CBC W/ AUTO DIFFERENTIAL   LIPASE   ALCOHOL,MEDICAL (ETHANOL)   URINALYSIS, REFLEX TO URINE CULTURE   DRUG SCREEN PANEL, URINE EMERGENCY     EKG Readings: (Independently Interpreted)   Normal sinus rhythm, rate 64 beats per minute, normal PA interval,  milliseconds.  No STEMI.         Imaging Results    None          Medications   promethazine (PHENERGAN) 12.5 mg in dextrose 5 % (D5W) 50 mL IVPB (has no administration in time range)   0.9%  NaCl infusion (has no administration in time range)   0.9%  NaCl infusion (has no administration in time range)   sodium chloride 0.9% bolus 1,000 mL 1,000 mL (1,000 mLs Intravenous New Bag 9/4/23 1106)   ondansetron injection 4 mg (4 mg Intravenous Given 9/4/23 1108)   morphine injection 4  mg (4 mg Intravenous Given 9/4/23 1109)     Medical Decision Making  48-year-old male with history of hypertension presents to the ED complaining of bilateral flank pain, decreased urination.  He is also having nausea and vomiting and epigastric pain.  Symptoms started several days ago after smoking Mojo.  He is not taken any medications for her symptoms at home.  He has been out of his blood pressure medications for about a month.  He denies any dysuria, reports urine is decreased.  He presented with EMS who administered 1 L of fluids prior to arrival.  On exam, the patient has epigastric tenderness, he has tenderness in the right flank and left flank area, no midline tenderness.  He is hypertensive.  Differential includes but not limited to dehydration, gastroenteritis, UTI,pancreatitis, msk pain, nephrolithiasis, rhabdomyolysis, toxidrome.  Workup initiated with labs, urinalysis, will treat with Zofran, IV fluids, morphine and reassess.    Amount and/or Complexity of Data Reviewed  Labs: ordered.    Risk  Prescription drug management.    Case reviewed with Dr. Guerrero for hospital admission.        Scribe Attestation:   Scribe #1: I performed the above scribed service and the documentation accurately describes the services I performed. I attest to the accuracy of the note.        ED Course as of 09/04/23 1235   Mon Sep 04, 2023   1224 Patient reassessed, complains of continued nausea.  I reviewed test results with the patient, his potassium is 2.8, his creatinine today is 7.5 with a BUN of 44.  No leukocytosis.  .  Lipase within normal limits.  Ethanol level negative.  Patient has had some elevated creatinine in the past but most recently levels have been normal.  After 2 L of fluids, he has not been able to provide any urine.  Will consult Nephrology and admit to Hospital Medicine. [LH]   1228 Case reviewed with Dr. Lamas, nephrology, recommends NS infusion 100 ml/hr. [LH]      ED Course User Index  [LH]  Angelita Ordonez MD                  This dictation has been generated using M-Modal Fluency Direct dictation; some phonetic errors may occur.     Scribe attestation: I, Dr. Angelita Ordonez, personally performed the services described in this documentation. All medical record entries made by the scribe were at my direction and in my presence.  I have reviewed the chart and agree that the record reflects my personal performance and is accurate and complete       Clinical Impression:   Final diagnoses:  [R11.2] Nausea and vomiting, unspecified vomiting type  [N17.9] ADINA (acute kidney injury) (Primary)  [E87.6] Hypokalemia  [I10] Hypertension, unspecified type        ED Disposition Condition    Admit                 Angelita Ordonez MD  09/04/23 9809

## 2023-09-04 NOTE — NURSING
Received report from CHLOE Sal from ED. Pt AAOx4. /81, HR 82, O2 97% on RA, T 97.9, R 19. Awaiting pt's arrival to the floor.

## 2023-09-04 NOTE — ASSESSMENT & PLAN NOTE
Patient with acute kidney injury/acute renal failure likely due to pre-renal azotemia due to dehydration ADINA is currently worsening. Baseline creatinine 2.5 - Labs reviewed- Renal function/electrolytes with Estimated Creatinine Clearance: 13.8 mL/min (A) (based on SCr of 7.5 mg/dL (H)). according to latest data. Monitor urine output and serial BMP and adjust therapy as needed. Avoid nephrotoxins and renally dose meds for GFR listed above.  patient has Cr. Of 7.5,baseline about 2.5.,bladder scan show no sign of retention,he received 2 liter bolus of fluid and has been started on IVF,nephrology is consulted,ordered CT renal.

## 2023-09-04 NOTE — NURSING
[facility] [place]  Nurses Note -- 4 Eyes      9/4/2023       Skin assessed on: Admit      [x] No Pressure Injuries Present    []Prevention Measures Documented    [] Yes LDA  for Pressure Injury Previously documented     [] Yes New Pressure Injury Discovered   [] LDA for New Pressure Injury Added      Attending RN:  Amee Drew LPN     Second RN:  Sylvie Jett,PCT

## 2023-09-04 NOTE — Clinical Note
Diagnosis: ADIAN (acute kidney injury) [079317]   Admitting Provider:: ASHLEE NAJERA [[id number]]   Future Attending Provider: SUSAN CRISTOBAL [[id number]]   Reason for IP Medical Treatment  (Clinical interventions that can only be accomplished in the IP setting? ) :: IV fluids, monitoring of kidney function   I certify that Inpatient services for greater than or equal to 2 midnights are medically necessary:: Yes   Plans for Post-Acute care--if anticipated (pick the single best option):: A. No post acute care anticipated at this time

## 2023-09-05 PROBLEM — F11.10 OPIOID ABUSE: Status: ACTIVE | Noted: 2023-09-04

## 2023-09-05 LAB
ANION GAP SERPL CALC-SCNC: 12 MMOL/L (ref 8–16)
ANION GAP SERPL CALC-SCNC: 13 MMOL/L (ref 8–16)
BUN SERPL-MCNC: 36 MG/DL (ref 6–20)
BUN SERPL-MCNC: 43 MG/DL (ref 6–20)
C3 SERPL-MCNC: 117 MG/DL (ref 50–180)
C4 SERPL-MCNC: 29 MG/DL (ref 11–44)
CALCIUM SERPL-MCNC: 8.8 MG/DL (ref 8.7–10.5)
CALCIUM SERPL-MCNC: 9.1 MG/DL (ref 8.7–10.5)
CHLORIDE SERPL-SCNC: 101 MMOL/L (ref 95–110)
CHLORIDE SERPL-SCNC: 103 MMOL/L (ref 95–110)
CO2 SERPL-SCNC: 22 MMOL/L (ref 23–29)
CO2 SERPL-SCNC: 23 MMOL/L (ref 23–29)
CREAT SERPL-MCNC: 2.5 MG/DL (ref 0.5–1.4)
CREAT SERPL-MCNC: 3.6 MG/DL (ref 0.5–1.4)
EST. GFR  (NO RACE VARIABLE): 20 ML/MIN/1.73 M^2
EST. GFR  (NO RACE VARIABLE): 31 ML/MIN/1.73 M^2
GLUCOSE SERPL-MCNC: 102 MG/DL (ref 70–110)
GLUCOSE SERPL-MCNC: 104 MG/DL (ref 70–110)
HAV IGM SERPL QL IA: NORMAL
HBV CORE IGM SERPL QL IA: NORMAL
HBV SURFACE AG SERPL QL IA: NORMAL
HCV AB SERPL QL IA: NORMAL
POTASSIUM SERPL-SCNC: 2.3 MMOL/L (ref 3.5–5.1)
POTASSIUM SERPL-SCNC: 2.6 MMOL/L (ref 3.5–5.1)
SODIUM SERPL-SCNC: 136 MMOL/L (ref 136–145)
SODIUM SERPL-SCNC: 138 MMOL/L (ref 136–145)

## 2023-09-05 PROCEDURE — 25000003 PHARM REV CODE 250: Performed by: REGISTERED NURSE

## 2023-09-05 PROCEDURE — 63600175 PHARM REV CODE 636 W HCPCS: Performed by: HOSPITALIST

## 2023-09-05 PROCEDURE — 25000003 PHARM REV CODE 250: Performed by: HOSPITALIST

## 2023-09-05 PROCEDURE — 80048 BASIC METABOLIC PNL TOTAL CA: CPT | Mod: 91 | Performed by: HOSPITALIST

## 2023-09-05 PROCEDURE — 36415 COLL VENOUS BLD VENIPUNCTURE: CPT | Performed by: HOSPITALIST

## 2023-09-05 PROCEDURE — 80048 BASIC METABOLIC PNL TOTAL CA: CPT | Performed by: HOSPITALIST

## 2023-09-05 PROCEDURE — 21400001 HC TELEMETRY ROOM

## 2023-09-05 RX ORDER — POTASSIUM CHLORIDE 20 MEQ/1
40 TABLET, EXTENDED RELEASE ORAL 2 TIMES DAILY
Status: DISCONTINUED | OUTPATIENT
Start: 2023-09-05 | End: 2023-09-05

## 2023-09-05 RX ORDER — POTASSIUM CHLORIDE 20 MEQ/1
40 TABLET, EXTENDED RELEASE ORAL ONCE
Status: COMPLETED | OUTPATIENT
Start: 2023-09-05 | End: 2023-09-05

## 2023-09-05 RX ORDER — POTASSIUM CHLORIDE 20 MEQ/1
60 TABLET, EXTENDED RELEASE ORAL 2 TIMES DAILY
Status: COMPLETED | OUTPATIENT
Start: 2023-09-05 | End: 2023-09-05

## 2023-09-05 RX ORDER — HYDROCODONE BITARTRATE AND ACETAMINOPHEN 5; 325 MG/1; MG/1
1 TABLET ORAL EVERY 6 HOURS PRN
Status: DISCONTINUED | OUTPATIENT
Start: 2023-09-05 | End: 2023-09-06 | Stop reason: HOSPADM

## 2023-09-05 RX ORDER — POTASSIUM CHLORIDE 750 MG/1
10 TABLET, EXTENDED RELEASE ORAL 2 TIMES DAILY
Status: DISCONTINUED | OUTPATIENT
Start: 2023-09-05 | End: 2023-09-05

## 2023-09-05 RX ORDER — SODIUM CHLORIDE AND POTASSIUM CHLORIDE 150; 450 MG/100ML; MG/100ML
INJECTION, SOLUTION INTRAVENOUS CONTINUOUS
Status: DISCONTINUED | OUTPATIENT
Start: 2023-09-05 | End: 2023-09-06 | Stop reason: HOSPADM

## 2023-09-05 RX ADMIN — POTASSIUM CHLORIDE AND SODIUM CHLORIDE: 450; 150 INJECTION, SOLUTION INTRAVENOUS at 09:09

## 2023-09-05 RX ADMIN — SODIUM CHLORIDE: 9 INJECTION, SOLUTION INTRAVENOUS at 05:09

## 2023-09-05 RX ADMIN — HYDROCODONE BITARTRATE AND ACETAMINOPHEN 1 TABLET: 5; 325 TABLET ORAL at 08:09

## 2023-09-05 RX ADMIN — POTASSIUM CHLORIDE 60 MEQ: 1500 TABLET, EXTENDED RELEASE ORAL at 09:09

## 2023-09-05 RX ADMIN — HYDRALAZINE HYDROCHLORIDE 50 MG: 25 TABLET, FILM COATED ORAL at 09:09

## 2023-09-05 RX ADMIN — HYDROCODONE BITARTRATE AND ACETAMINOPHEN 1 TABLET: 5; 325 TABLET ORAL at 02:09

## 2023-09-05 RX ADMIN — HYDRALAZINE HYDROCHLORIDE 50 MG: 25 TABLET, FILM COATED ORAL at 02:09

## 2023-09-05 RX ADMIN — SODIUM CHLORIDE: 9 INJECTION, SOLUTION INTRAVENOUS at 08:09

## 2023-09-05 RX ADMIN — AMLODIPINE BESYLATE 10 MG: 5 TABLET ORAL at 08:09

## 2023-09-05 RX ADMIN — ONDANSETRON 4 MG: 2 INJECTION INTRAMUSCULAR; INTRAVENOUS at 12:09

## 2023-09-05 RX ADMIN — POTASSIUM CHLORIDE 40 MEQ: 1500 TABLET, EXTENDED RELEASE ORAL at 12:09

## 2023-09-05 RX ADMIN — ONDANSETRON 4 MG: 2 INJECTION INTRAMUSCULAR; INTRAVENOUS at 07:09

## 2023-09-05 RX ADMIN — POTASSIUM CHLORIDE 40 MEQ: 1500 TABLET, EXTENDED RELEASE ORAL at 08:09

## 2023-09-05 RX ADMIN — ONDANSETRON 4 MG: 2 INJECTION INTRAMUSCULAR; INTRAVENOUS at 01:09

## 2023-09-05 RX ADMIN — HYDRALAZINE HYDROCHLORIDE 50 MG: 25 TABLET, FILM COATED ORAL at 05:09

## 2023-09-05 RX ADMIN — HYDROCODONE BITARTRATE AND ACETAMINOPHEN 1 TABLET: 5; 325 TABLET ORAL at 09:09

## 2023-09-05 NOTE — PLAN OF CARE
Problem: Adult Inpatient Plan of Care  Goal: Plan of Care Review  Outcome: Ongoing, Progressing  Goal: Patient-Specific Goal (Individualized)  Outcome: Ongoing, Progressing  Goal: Absence of Hospital-Acquired Illness or Injury  Outcome: Ongoing, Progressing  Goal: Optimal Comfort and Wellbeing  Outcome: Ongoing, Progressing  Goal: Readiness for Transition of Care  Outcome: Ongoing, Progressing     Problem: Fatigue  Goal: Improved Activity Tolerance  Outcome: Ongoing, Progressing     Problem: Fluid and Electrolyte Imbalance (Acute Kidney Injury/Impairment)  Goal: Fluid and Electrolyte Balance  Outcome: Ongoing, Progressing     Problem: Oral Intake Inadequate (Acute Kidney Injury/Impairment)  Goal: Optimal Nutrition Intake  Outcome: Ongoing, Progressing     Problem: Renal Function Impairment (Acute Kidney Injury/Impairment)  Goal: Effective Renal Function  Outcome: Ongoing, Progressing

## 2023-09-05 NOTE — PROGRESS NOTES
"[facility] Medicine  Progress Note    Patient Name: Jayson Berkowitz  MRN: [id number]  Patient Class: IP- Inpatient   Admission Date: 9/4/2023  Length of Stay: 1 days  Attending Physician: Allie Tovar, *  Primary Care Provider: Jasper Henry MD        Subjective:     Principal Problem:Acute renal failure superimposed on stage 3 chronic kidney disease        HPI:  This is a 48 y.o. M who has a PMHx of Addiction to drug, Gastritis, GERD, HTN, and Renal disorder who presents to the ED for emergent evaluation of acute left lower back pain, and right sided back pain that began 4 days ago. Pt also complains of nausea, vomiting, an epigastric abdominal pain since smoking "mojo" marijuana 4 days ago. Pt states that he was once "addicted to mojo" 3-4 years ago. He smoked mojo for the first time in 3-4 years 4 days ago. He attributes the abdominal pain to vomiting. He endorses occasional subjective fever that he attributes to mojo. Pt also reports oliguria for the last 4 days. Pt has HTN, but states that he ran out of Amlodipine 1 month ago. He also reports running out of potassium. He has not been taking any prescribed medications. Pt denies dysuria, or diarrhea.patient has Cr. Of 7.5,baseline about 2.5.,bladder scan show no sign of retention,he received 2 liter bolus of fluid and has been started on IVF,nephrology is consulted,ordered CT renal.      Overview/Hospital Course:  This is a 48 y.o. M who has a PMHx of Addiction to drug, Gastritis, GERD, HTN, and Renal disorder who presents to the ED for emergent evaluation of acute left lower back pain, and right sided back pain that began 4 days ago. Pt also complains of nausea, vomiting, an epigastric abdominal pain since smoking "mojo" marijuana 4 days ago. . He attributes the abdominal pain to vomiting. He endorses occasional subjective fever that he attributes to mojo. Pt also reports oliguria for the last 4 days. Pt has HTN, but states that he " ran out of Amlodipine 1 month ago.  Pt denies dysuria, or diarrhea.patient has Cr. Of 7.5,baseline about 2.5.,bladder scan show no sign of retention,he received 2 liter bolus of fluid and has been started on IVF,nephrology is consulted, CT renal show no sign obstructive uropathy.  U tox is positive for opoid,consulted avoid illicit drug use.  ARF with IVF is improving,  Replacing potasium.      Past Medical History:   Diagnosis Date    Addiction to drug     Gastritis     GERD (gastroesophageal reflux disease)     Hallucination     Hypertension     Renal disorder     Sleep difficulties     Substance abuse        Past Surgical History:   Procedure Laterality Date    LEG SURGERY Right     NO PAST SURGERIES  11/21/2019       Review of patient's allergies indicates:  No Known Allergies    No current facility-administered medications on file prior to encounter.     Current Outpatient Medications on File Prior to Encounter   Medication Sig    albuterol (PROVENTIL/VENTOLIN HFA) 90 mcg/actuation inhaler Inhale 1-2 puffs into the lungs every 6 (six) hours as needed for Wheezing. Rescue (Patient not taking: No sig reported)    amLODIPine (NORVASC) 10 MG tablet Take 1 tablet (10 mg total) by mouth once daily. (Patient not taking: Reported on 7/14/2021)    esomeprazole (NEXIUM) 20 MG capsule TAKE 1 CAPSULE BY MOUTH EVERY DAY BEFORE BREAKFAST    ketoconazole (NIZORAL) 2 % cream Apply topically 2 (two) times daily. Apply to affected areas of skin for tinea cruris    losartan-hydrochlorothiazide 50-12.5 mg (HYZAAR) 50-12.5 mg per tablet TAKE 1 TABLET BY MOUTH EVERY DAY    naproxen (NAPROSYN) 500 MG tablet Take 1 tablet (500 mg total) by mouth every 12 (twelve) hours as needed (Pain). (Patient not taking: No sig reported)    ondansetron (ZOFRAN) 4 MG tablet Take 1 tablet (4 mg total) by mouth every 6 (six) hours. (Patient not taking: No sig reported)    promethazine (PHENERGAN) 25 MG suppository Place 1 suppository  (25 mg total) rectally every 6 (six) hours as needed for Nausea. (Patient not taking: No sig reported)     Family History       Problem Relation (Age of Onset)    Arthritis Mother    Crohn's disease Sister    Hearing loss Father          Tobacco Use    Smoking status: Former     Current packs/day: 0.25     Average packs/day: 0.3 packs/day for 3.0 years (0.8 ttl pk-yrs)     Types: Cigarettes    Smokeless tobacco: Never   Substance and Sexual Activity    Alcohol use: Yes     Comment: socially    Drug use: Yes     Types: Marijuana     Comment: mojo daily    Sexual activity: Yes     Partners: Female     Birth control/protection: None     Review of Systems   Constitutional:  Positive for activity change and appetite change.   HENT:  Negative for congestion and dental problem.    Eyes:  Negative for discharge.   Respiratory:  Negative for apnea and chest tightness.    Cardiovascular:  Negative for chest pain.   Gastrointestinal:  Negative for abdominal distention and abdominal pain.   Endocrine: Negative for cold intolerance and heat intolerance.   Genitourinary:  Positive for difficulty urinating and flank pain. Negative for dysuria.   Musculoskeletal:  Negative for arthralgias and back pain.   Allergic/Immunologic: Negative for environmental allergies.   Neurological:  Negative for dizziness and facial asymmetry.   Hematological:  Negative for adenopathy. Does not bruise/bleed easily.   Psychiatric/Behavioral:  Negative for agitation and behavioral problems.      Objective:     Vital Signs (Most Recent):  Temp: 98.3 °F (36.8 °C) (09/05/23 0749)  Pulse: 72 (09/05/23 0749)  Resp: 16 (09/05/23 0803)  BP: 138/68 (09/05/23 0749)  SpO2: 96 % (09/05/23 0749) Vital Signs (24h Range):  Temp:  [97.8 °F (36.6 °C)-98.5 °F (36.9 °C)] 98.3 °F (36.8 °C)  Pulse:  [72-83] 72  Resp:  [16-22] 16  SpO2:  [96 %-100 %] 96 %  BP: (116-187)/() 138/68     Weight: 79.4 kg (175 lb)  Body mass index is 22.47 kg/m².     Physical  Exam  HENT:      Head: Normocephalic and atraumatic.      Nose: Nose normal. No congestion.      Mouth/Throat:      Mouth: Mucous membranes are dry.      Pharynx: No oropharyngeal exudate.   Eyes:      Extraocular Movements: Extraocular movements intact.      Pupils: Pupils are equal, round, and reactive to light.   Cardiovascular:      Rate and Rhythm: Normal rate.      Heart sounds: No murmur heard.  Pulmonary:      Effort: Pulmonary effort is normal.      Breath sounds: Normal breath sounds.   Abdominal:      General: There is no distension.      Tenderness: There is no abdominal tenderness.   Musculoskeletal:         General: No swelling or deformity. Normal range of motion.      Cervical back: Normal range of motion and neck supple.   Skin:     Coloration: Skin is not jaundiced.      Findings: No bruising.   Neurological:      Mental Status: He is alert and oriented to person, place, and time.      Cranial Nerves: No cranial nerve deficit.      Motor: No weakness.   Psychiatric:         Mood and Affect: Mood normal.         Behavior: Behavior normal.              CRANIAL NERVES     CN III, IV, VI   Pupils are equal, round, and reactive to light.       Significant Labs: All pertinent labs within the past 24 hours have been reviewed.  BMP:   Recent Labs   Lab 09/05/23  0639         K 2.3*      CO2 22*   BUN 43*   CREATININE 3.6*   CALCIUM 8.8       CBC:   Recent Labs   Lab 09/04/23  1108   WBC 9.52   HGB 17.0   HCT 49.2          CMP:   Recent Labs   Lab 09/04/23  1108 09/05/23  0639    136   K 2.8* 2.3*   CL 94* 101   CO2 21* 22*   * 102   BUN 44* 43*   CREATININE 7.5* 3.6*   CALCIUM 9.8 8.8   PROT 10.1*  --    ALBUMIN 4.7  --    BILITOT 0.5  --    ALKPHOS 136*  --    AST 25  --    ALT 18  --    ANIONGAP 22* 13         Significant Imaging: I have reviewed all pertinent imaging results/findings within the past 24 hours.      Assessment/Plan:      * Acute renal failure  superimposed on stage 3 chronic kidney disease  Patient with acute kidney injury/acute renal failure likely due to pre-renal azotemia due to dehydration ADINA is currently worsening. Baseline creatinine 2.5 - Labs reviewed- Renal function/electrolytes with Estimated Creatinine Clearance: 28.8 mL/min (A) (based on SCr of 3.6 mg/dL (H)). according to latest data. Monitor urine output and serial BMP and adjust therapy as needed. Avoid nephrotoxins and renally dose meds for GFR listed above.  patient has Cr. Of 7.5,baseline about 2.5.,bladder scan show no sign of retention,he received 2 liter bolus of fluid and has been started on IVF,nephrology is consulted,  CT renal show no sign obstructive uropathy.  Improving.    Opioid abuse  Consulted avoid illicit drug use.      Hypokalemia  Replaced.      Stage 3 chronic kidney disease  Baseline Cr. Is about 2.5,will monitor.      Essential hypertension  Stared on Norvasc,hydraalzine,prn clonidine.      Tobacco abuse  Consulted over 6 minutes need quit smoking.        VTE Risk Mitigation (From admission, onward)    None          Discharge Planning   ELO:      Code Status: Prior   Is the patient medically ready for discharge?:     Reason for patient still in hospital (select all that apply): Patient trending condition                     Allie Tovar MD  Department of Hospital Medicine   [facility] [place] [facility]

## 2023-09-05 NOTE — NURSING
Bedside Report given to night nurse CHLOE Wallace. Walking rounds completed. Visualized and assessed patient NAD noted. Safety precautions maintained and call light within reach.     Chart check completed.

## 2023-09-05 NOTE — PLAN OF CARE
Case Management Assessment     PCP: Pt agreed to go to [facility]  Pharmacy: [facility]    Patient Arrived From: Home  Existing Help at Home: Bealexandern- mother    Barriers to Discharge: uninsured and substance abuse    Discharge Plan:    A. Home with family   B. Other- tbd       CM sent an e-mail to [facility] to assist pt with an Medicaid application.          09/05/23 1423   Discharge Assessment   Assessment Type Discharge Planning Assessment   Confirmed/corrected address, phone number and insurance Yes   Confirmed Demographics Correct on Facesheet   Source of Information patient   When was your last doctors appointment?   (Pt hasn't seen a MD in [facility].)   Reason For Admission Acute renal failure superimposed on stage 3 chronic kidney disease   People in Home parent(s)   Facility Arrived From: home   Do you expect to return to your current living situation? Yes   Do you have help at home or someone to help you manage your care at home? Yes   Who are your caregiver(s) and their phone number(s)? Adria- mother [phone]   Prior to hospitilization cognitive status: Alert/Oriented   Current cognitive status: Alert/Oriented   Walking or Climbing Stairs   (none)   Dressing/Bathing   (none)   Equipment Currently Used at Home none   Readmission within 30 days? Yes   Patient currently being followed by outpatient case management? No   Do you currently have service(s) that help you manage your care at home? No   Do you take prescription medications? Yes   Do you have prescription coverage? No   Coverage Uninsured   Do you have any problems affording any of your prescribed medications? TBD   Who is going to help you get home at discharge? Essence- girlfriend [phone]   How do you get to doctors appointments? car, drives self;family or friend will provide   Are you on dialysis? No   Do you take coumadin? No   DME Needed Upon Discharge  none   Discharge Plan discussed with: Patient   Transition of Care  Barriers Substance Abuse;Unisured   Discharge Plan A Home with family   Discharge Plan B Home with family   Physical Activity   On average, how many days per week do you engage in moderate to strenuous exercise (like a brisk walk)? 0 days   On average, how many minutes do you engage in exercise at this level? 0 min   Financial Resource Strain   How hard is it for you to pay for the very basics like food, housing, medical care, and heating? Somewhat   Housing Stability   In the last 12 months, was there a time when you were not able to pay the mortgage or rent on time? N   In the last 12 months, how many places have you lived? 1   In the last 12 months, was there a time when you did not have a steady place to sleep or slept in a shelter (including now)? N   Transportation Needs   In the past 12 months, has lack of transportation kept you from medical appointments or from getting medications? no   In the past 12 months, has lack of transportation kept you from meetings, work, or from getting things needed for daily living? No   Food Insecurity   Within the past 12 months, you worried that your food would run out before you got the money to buy more. Never true   Within the past 12 months, the food you bought just didn't last and you didn't have money to get more. Never true   Stress   Do you feel stress - tense, restless, nervous, or anxious, or unable to sleep at night because your mind is troubled all the time - these days? To some exte   Social Connections   In a typical week, how many times do you talk on the phone with family, friends, or neighbors? Three   How often do you get together with friends or relatives? Three times   How often do you attend Anabaptism or Catholic services? Never   Do you belong to any clubs or organizations such as Anabaptism groups, unions, fraternal or athletic groups, or school groups? No   How often do you attend meetings of the clubs or organizations you belong to? Never   Are you  , , , , never , or living with a partner? Never marrie   Alcohol Use   Q1: How often do you have a drink containing alcohol? 4 or more ti   Q2: How many drinks containing alcohol do you have on a typical day when you are drinking? 3 or 4   Q3: How often do you have six or more drinks on one occasion? Daily   OTHER   Name(s) of People in Home Bevelyn- mother

## 2023-09-05 NOTE — SUBJECTIVE & OBJECTIVE
Past Medical History:   Diagnosis Date    Addiction to drug     Gastritis     GERD (gastroesophageal reflux disease)     Hallucination     Hypertension     Renal disorder     Sleep difficulties     Substance abuse        Past Surgical History:   Procedure Laterality Date    LEG SURGERY Right     NO PAST SURGERIES  11/21/2019       Review of patient's allergies indicates:  No Known Allergies    No current facility-administered medications on file prior to encounter.     Current Outpatient Medications on File Prior to Encounter   Medication Sig    albuterol (PROVENTIL/VENTOLIN HFA) 90 mcg/actuation inhaler Inhale 1-2 puffs into the lungs every 6 (six) hours as needed for Wheezing. Rescue (Patient not taking: No sig reported)    amLODIPine (NORVASC) 10 MG tablet Take 1 tablet (10 mg total) by mouth once daily. (Patient not taking: Reported on 7/14/2021)    esomeprazole (NEXIUM) 20 MG capsule TAKE 1 CAPSULE BY MOUTH EVERY DAY BEFORE BREAKFAST    ketoconazole (NIZORAL) 2 % cream Apply topically 2 (two) times daily. Apply to affected areas of skin for tinea cruris    losartan-hydrochlorothiazide 50-12.5 mg (HYZAAR) 50-12.5 mg per tablet TAKE 1 TABLET BY MOUTH EVERY DAY    naproxen (NAPROSYN) 500 MG tablet Take 1 tablet (500 mg total) by mouth every 12 (twelve) hours as needed (Pain). (Patient not taking: No sig reported)    ondansetron (ZOFRAN) 4 MG tablet Take 1 tablet (4 mg total) by mouth every 6 (six) hours. (Patient not taking: No sig reported)    promethazine (PHENERGAN) 25 MG suppository Place 1 suppository (25 mg total) rectally every 6 (six) hours as needed for Nausea. (Patient not taking: No sig reported)     Family History       Problem Relation (Age of Onset)    Arthritis Mother    Crohn's disease Sister    Hearing loss Father          Tobacco Use    Smoking status: Former     Current packs/day: 0.25     Average packs/day: 0.3 packs/day for 3.0 years (0.8 ttl pk-yrs)     Types: Cigarettes    Smokeless tobacco:  Never   Substance and Sexual Activity    Alcohol use: Yes     Comment: socially    Drug use: Yes     Types: Marijuana     Comment: mojo daily    Sexual activity: Yes     Partners: Female     Birth control/protection: None     Review of Systems   Constitutional:  Positive for activity change and appetite change.   HENT:  Negative for congestion and dental problem.    Eyes:  Negative for discharge.   Respiratory:  Negative for apnea and chest tightness.    Cardiovascular:  Negative for chest pain.   Gastrointestinal:  Negative for abdominal distention and abdominal pain.   Endocrine: Negative for cold intolerance and heat intolerance.   Genitourinary:  Positive for difficulty urinating and flank pain. Negative for dysuria.   Musculoskeletal:  Negative for arthralgias and back pain.   Allergic/Immunologic: Negative for environmental allergies.   Neurological:  Negative for dizziness and facial asymmetry.   Hematological:  Negative for adenopathy. Does not bruise/bleed easily.   Psychiatric/Behavioral:  Negative for agitation and behavioral problems.      Objective:     Vital Signs (Most Recent):  Temp: 98.3 °F (36.8 °C) (09/05/23 0749)  Pulse: 72 (09/05/23 0749)  Resp: 16 (09/05/23 0803)  BP: 138/68 (09/05/23 0749)  SpO2: 96 % (09/05/23 0749) Vital Signs (24h Range):  Temp:  [97.8 °F (36.6 °C)-98.5 °F (36.9 °C)] 98.3 °F (36.8 °C)  Pulse:  [72-83] 72  Resp:  [16-22] 16  SpO2:  [96 %-100 %] 96 %  BP: (116-187)/() 138/68     Weight: 79.4 kg (175 lb)  Body mass index is 22.47 kg/m².     Physical Exam  HENT:      Head: Normocephalic and atraumatic.      Nose: Nose normal. No congestion.      Mouth/Throat:      Mouth: Mucous membranes are dry.      Pharynx: No oropharyngeal exudate.   Eyes:      Extraocular Movements: Extraocular movements intact.      Pupils: Pupils are equal, round, and reactive to light.   Cardiovascular:      Rate and Rhythm: Normal rate.      Heart sounds: No murmur heard.  Pulmonary:      Effort:  Pulmonary effort is normal.      Breath sounds: Normal breath sounds.   Abdominal:      General: There is no distension.      Tenderness: There is no abdominal tenderness.   Musculoskeletal:         General: No swelling or deformity. Normal range of motion.      Cervical back: Normal range of motion and neck supple.   Skin:     Coloration: Skin is not jaundiced.      Findings: No bruising.   Neurological:      Mental Status: He is alert and oriented to person, place, and time.      Cranial Nerves: No cranial nerve deficit.      Motor: No weakness.   Psychiatric:         Mood and Affect: Mood normal.         Behavior: Behavior normal.              CRANIAL NERVES     CN III, IV, VI   Pupils are equal, round, and reactive to light.       Significant Labs: All pertinent labs within the past 24 hours have been reviewed.  BMP:   Recent Labs   Lab 09/05/23  0639         K 2.3*      CO2 22*   BUN 43*   CREATININE 3.6*   CALCIUM 8.8       CBC:   Recent Labs   Lab 09/04/23  1108   WBC 9.52   HGB 17.0   HCT 49.2          CMP:   Recent Labs   Lab 09/04/23  1108 09/05/23  0639    136   K 2.8* 2.3*   CL 94* 101   CO2 21* 22*   * 102   BUN 44* 43*   CREATININE 7.5* 3.6*   CALCIUM 9.8 8.8   PROT 10.1*  --    ALBUMIN 4.7  --    BILITOT 0.5  --    ALKPHOS 136*  --    AST 25  --    ALT 18  --    ANIONGAP 22* 13         Significant Imaging: I have reviewed all pertinent imaging results/findings within the past 24 hours.

## 2023-09-05 NOTE — PLAN OF CARE
Problem: Adult Inpatient Plan of Care  Goal: Plan of Care Review  Outcome: Ongoing, Progressing     Problem: Fatigue  Goal: Improved Activity Tolerance  Outcome: Ongoing, Progressing     Problem: Fluid and Electrolyte Imbalance (Acute Kidney Injury/Impairment)  Goal: Fluid and Electrolyte Balance  Outcome: Ongoing, Progressing     Problem: Renal Function Impairment (Acute Kidney Injury/Impairment)  Goal: Effective Renal Function  Outcome: Ongoing, Progressing

## 2023-09-05 NOTE — HOSPITAL COURSE
"This is a 48 y.o. M who has a PMHx of Addiction to drug, Gastritis, GERD, HTN, and Renal disorder who presents to the ED for emergent evaluation of acute left lower back pain, and right sided back pain that began 4 days ago. Pt also complains of nausea, vomiting, an epigastric abdominal pain since smoking "mojo" marijuana 4 days ago. . He attributes the abdominal pain to vomiting. He endorses occasional subjective fever that he attributes to mojo. Pt also reports oliguria for the last 4 days. Pt has HTN, but states that he ran out of Amlodipine 1 month ago.  Pt denies dysuria, or diarrhea.patient has Cr. Of 7.5,baseline about 2.5.,bladder scan show no sign of retention,he received 2 liter bolus of fluid and has been started on IVF,nephrology is consulted, CT renal show no sign obstructive uropathy.  U tox is positive for opoid,consulted avoid illicit drug use.  ARF with IVF is much improved.  Replaced  potasium.  At DC time his Cr. Was on baseline,hypokalemia is resolved,  Patient was discharged home with new BP med;s and follow up with PCP as out patient.  "

## 2023-09-05 NOTE — NURSING
[facility]  Nurses Note -- 4 Eyes      9/5/2023       Skin assessed on: Q Shift      [x] No Pressure Injuries Present    []Prevention Measures Documented    [] Yes LDA  for Pressure Injury Previously documented     [] Yes New Pressure Injury Discovered   [] LDA for New Pressure Injury Added      Attending RN:  Angelita Hoover LPN     Second RN:  Madison CORONA

## 2023-09-05 NOTE — ASSESSMENT & PLAN NOTE
Patient with acute kidney injury/acute renal failure likely due to pre-renal azotemia due to dehydration ADINA is currently worsening. Baseline creatinine 2.5 - Labs reviewed- Renal function/electrolytes with Estimated Creatinine Clearance: 28.8 mL/min (A) (based on SCr of 3.6 mg/dL (H)). according to latest data. Monitor urine output and serial BMP and adjust therapy as needed. Avoid nephrotoxins and renally dose meds for GFR listed above.  patient has Cr. Of 7.5,baseline about 2.5.,bladder scan show no sign of retention,he received 2 liter bolus of fluid and has been started on IVF,nephrology is consulted,  CT renal show no sign obstructive uropathy.  Improving.

## 2023-09-05 NOTE — CONSULTS
Unfortunate 48 y o m who was admitted with back pain, oliguria and n-v x 4 days. Apparently all this stared after smoking MOJO     Admission lab showed a s creat of 7.5 but the follow up was noted down to 3.6. CKD ok bicarb lowish but acceptable. Total protein high ca ok HH 17/49     Denies dysuria hematuria or frequency denies use of NSAID's or recently receiving IVD dye bactrim or vanco    Denies K stones     Denies CNS ENT CP GI  RHEUM OR DERM SX  Past Medical History:   Diagnosis Date    Addiction to drug     Gastritis     GERD (gastroesophageal reflux disease)     Hallucination     Hypertension     Renal disorder     Sleep difficulties     Substance abuse      Past Surgical History:   Procedure Laterality Date    LEG SURGERY Right     NO PAST SURGERIES  11/21/2019     Review of patient's allergies indicates:  No Known Allergies  Social History     Socioeconomic History    Marital status: Legally     Number of children: 7   Occupational History    Occupation: operation   Tobacco Use    Smoking status: Former     Current packs/day: 0.25     Average packs/day: 0.3 packs/day for 3.0 years (0.8 ttl pk-yrs)     Types: Cigarettes    Smokeless tobacco: Never   Substance and Sexual Activity    Alcohol use: Yes     Comment: socially    Drug use: Yes     Types: Marijuana     Comment: mojo daily    Sexual activity: Yes     Partners: Female     Birth control/protection: None   Other Topics Concern    Patient feels they ought to cut down on drinking/drug use No    Patient annoyed by others criticizing their drinking/drug use No    Patient has felt bad or guilty about drinking/drug use No    Patient has had a drink/used drugs as an eye opener in the AM No   Social History Narrative    Estranged from wife.    Lives with girlfriend and their baby.    Has 7 children with multiple mothers.     Works at [employer].    High school graduate.     Smokes Mojo daily.      Family History   Problem Relation Age of  Onset    Arthritis Mother     Hearing loss Father     Crohn's disease Sister        Current Facility-Administered Medications   Medication    0.9%  NaCl infusion    amLODIPine tablet 10 mg    cloNIDine tablet 0.2 mg    hydrALAZINE tablet 50 mg    HYDROcodone-acetaminophen 5-325 mg per tablet 1 tablet    ondansetron injection 4 mg    potassium chloride SA CR tablet 40 mEq       LABS    Recent Results (from the past 24 hour(s))   CBC auto differential    Collection Time: 09/04/23 11:08 AM   Result Value Ref Range    WBC 9.52 3.90 - 12.70 K/uL    RBC 5.81 4.60 - 6.20 M/uL    Hemoglobin 17.0 14.0 - 18.0 g/dL    Hematocrit 49.2 40.0 - 54.0 %    MCV 85 82 - 98 fL    MCH 29.3 27.0 - 31.0 pg    MCHC 34.6 32.0 - 36.0 g/dL    RDW 14.1 11.5 - 14.5 %    Platelets 351 150 - 450 K/uL    MPV 9.6 9.2 - 12.9 fL    Immature Granulocytes 0.3 0.0 - 0.5 %    Gran # (ANC) 6.4 1.8 - 7.7 K/uL    Immature Grans (Abs) 0.03 0.00 - 0.04 K/uL    Lymph # 2.1 1.0 - 4.8 K/uL    Mono # 0.9 0.3 - 1.0 K/uL    Eos # 0.0 0.0 - 0.5 K/uL    Baso # 0.04 0.00 - 0.20 K/uL    nRBC 0 0 /100 WBC    Gran % 67.3 38.0 - 73.0 %    Lymph % 22.5 18.0 - 48.0 %    Mono % 9.3 4.0 - 15.0 %    Eosinophil % 0.2 0.0 - 8.0 %    Basophil % 0.4 0.0 - 1.9 %    Differential Method Automated    Comprehensive metabolic panel    Collection Time: 09/04/23 11:08 AM   Result Value Ref Range    Sodium 137 136 - 145 mmol/L    Potassium 2.8 (L) 3.5 - 5.1 mmol/L    Chloride 94 (L) 95 - 110 mmol/L    CO2 21 (L) 23 - 29 mmol/L    Glucose 131 (H) 70 - 110 mg/dL    BUN 44 (H) 6 - 20 mg/dL    Creatinine 7.5 (H) 0.5 - 1.4 mg/dL    Calcium 9.8 8.7 - 10.5 mg/dL    Total Protein 10.1 (H) 6.0 - 8.4 g/dL    Albumin 4.7 3.5 - 5.2 g/dL    Total Bilirubin 0.5 0.1 - 1.0 mg/dL    Alkaline Phosphatase 136 (H) 55 - 135 U/L    AST 25 10 - 40 U/L    ALT 18 10 - 44 U/L    eGFR 8 (A) >60 mL/min/1.73 m^2    Anion Gap 22 (H) 8 - 16 mmol/L   Lipase    Collection Time: 09/04/23 11:08 AM   Result Value Ref Range     Lipase 54 4 - 60 U/L   CPK    Collection Time: 09/04/23 11:08 AM   Result Value Ref Range     (H) 20 - 200 U/L   Ethanol    Collection Time: 09/04/23 11:08 AM   Result Value Ref Range    Alcohol, Serum <10 <10 mg/dL   Drug screen panel, emergency    Collection Time: 09/04/23  5:26 PM   Result Value Ref Range    Benzodiazepines Negative Negative    Methadone metabolites Negative Negative    Cocaine (Metab.) Negative Negative    Opiate Scrn, Ur Presumptive Positive (A) Negative    Barbiturate Screen, Ur Negative Negative    Amphetamine Screen, Ur Negative Negative    THC Negative Negative    Phencyclidine Negative Negative    Creatinine, Urine >450.0 (H) 23.0 - 375.0 mg/dL    Toxicology Information SEE COMMENT    Sodium, urine, random    Collection Time: 09/04/23  5:26 PM   Result Value Ref Range    Sodium, Urine 31 20 - 250 mmol/L   Creatinine, urine, random    Collection Time: 09/04/23  5:26 PM   Result Value Ref Range    Creatinine, Urine >450.0 (H) 23.0 - 375.0 mg/dL   Urinalysis, Reflex to Urine Culture Urine, Clean Catch    Collection Time: 09/04/23  5:26 PM    Specimen: Urine   Result Value Ref Range    Specimen UA Urine, Clean Catch     Color, UA Yellow Yellow, Straw, Lucrecia    Appearance, UA Hazy (A) Clear    pH, UA 6.0 5.0 - 8.0    Specific Gravity, UA 1.020 1.005 - 1.030    Protein, UA 2+ (A) Negative    Glucose, UA Negative Negative    Ketones, UA Trace (A) Negative    Bilirubin (UA) Negative Negative    Occult Blood UA 1+ (A) Negative    Nitrite, UA Negative Negative    Urobilinogen, UA 2.0-3.0 (A) <2.0 EU/dL    Leukocytes, UA Negative Negative   Protein / creatinine ratio, urine    Collection Time: 09/04/23  5:26 PM   Result Value Ref Range    Protein, Urine Random 148 mg/dL    Creatinine, Urine >450.0 (H) 23.0 - 375.0 mg/dL    Prot/Creat Ratio, Urine Unable to calculate 0.00 - 0.20   Urinalysis Microscopic    Collection Time: 09/04/23  5:26 PM   Result Value Ref Range    RBC, UA 45 (H) 0 - 4  /hpf    WBC, UA 0 0 - 5 /hpf    WBC Clumps, UA Occasional (A) None-Rare    Bacteria None None-Occ /hpf    Squam Epithel, UA 2 /hpf    Hyaline Casts, UA 30 (A) 0-1/lpf /lpf    Microscopic Comment SEE COMMENT    Urinalysis, Reflex to Urine Culture Urine, Clean Catch    Collection Time: 09/04/23 10:12 PM    Specimen: Urine   Result Value Ref Range    Specimen UA Urine, Clean Catch     Color, UA Yellow Yellow, Straw, Lucrecia    Appearance, UA Hazy (A) Clear    pH, UA 6.0 5.0 - 8.0    Specific Gravity, UA 1.025 1.005 - 1.030    Protein, UA 2+ (A) Negative    Glucose, UA Trace (A) Negative    Ketones, UA Trace (A) Negative    Bilirubin (UA) Negative Negative    Occult Blood UA 1+ (A) Negative    Nitrite, UA Negative Negative    Urobilinogen, UA 2.0-3.0 (A) <2.0 EU/dL    Leukocytes, UA Negative Negative   Urinalysis Microscopic    Collection Time: 09/04/23 10:12 PM   Result Value Ref Range    RBC, UA 7 (H) 0 - 4 /hpf    WBC, UA 4 0 - 5 /hpf    Bacteria Rare None-Occ /hpf    Hyaline Casts, UA 12 (A) 0-1/lpf /lpf    Microscopic Comment SEE COMMENT    Basic metabolic panel    Collection Time: 09/05/23  6:39 AM   Result Value Ref Range    Sodium 136 136 - 145 mmol/L    Potassium 2.3 (LL) 3.5 - 5.1 mmol/L    Chloride 101 95 - 110 mmol/L    CO2 22 (L) 23 - 29 mmol/L    Glucose 102 70 - 110 mg/dL    BUN 43 (H) 6 - 20 mg/dL    Creatinine 3.6 (H) 0.5 - 1.4 mg/dL    Calcium 8.8 8.7 - 10.5 mg/dL    Anion Gap 13 8 - 16 mmol/L    eGFR 20 (A) >60 mL/min/1.73 m^2   ]    I/O last 3 completed shifts:  In: 50 [IV Piggyback:50]  Out: 600 [Urine:350; Emesis/NG output:250]    Vitals:    09/05/23 0217 09/05/23 0543 09/05/23 0749 09/05/23 0803   BP:  (!) 185/93 138/68    Pulse:  72 72    Resp: 18 18 18 16   Temp:  98.5 °F (36.9 °C) 98.3 °F (36.8 °C)    TempSrc:  Oral Oral    SpO2:  98% 96%    Weight:       Height:           No Jvd, Thyromegaly or Lymphadenopathy  Lungs: Fairly clear anteriorly and laterally  Cor: RRR no G or rubs  Abd: Soft benign  good bowel sounds non tender  Ext: No E C C    A)    ADINA likely secondary to dehydration and or MOJO  HypoK with normal mag and no alkalosis  HTN off his meds x 1 mo prior to admt   Substance abuse   N-V on admit    Admit lab suggested volume contraction     P)    Renal Diet  Home meds  Protect access  No HD for now   EPO not needed   Binders  Adjust all meds to the degree of renal fx  Close follow up I/O and weights  Maintain Hydration    Must stop the use of MOJO and or like substances (pt aware of my recommendations)

## 2023-09-06 VITALS
RESPIRATION RATE: 18 BRPM | OXYGEN SATURATION: 90 % | DIASTOLIC BLOOD PRESSURE: 72 MMHG | TEMPERATURE: 99 F | BODY MASS INDEX: 22.46 KG/M2 | SYSTOLIC BLOOD PRESSURE: 155 MMHG | HEIGHT: 74 IN | HEART RATE: 82 BPM | WEIGHT: 175 LBS

## 2023-09-06 LAB
ANA SER QL IF: NORMAL
ANION GAP SERPL CALC-SCNC: 8 MMOL/L (ref 8–16)
ANION GAP SERPL CALC-SCNC: 8 MMOL/L (ref 8–16)
BUN SERPL-MCNC: 27 MG/DL (ref 6–20)
BUN SERPL-MCNC: 31 MG/DL (ref 6–20)
CALCIUM SERPL-MCNC: 9 MG/DL (ref 8.7–10.5)
CALCIUM SERPL-MCNC: 9.4 MG/DL (ref 8.7–10.5)
CHLORIDE SERPL-SCNC: 104 MMOL/L (ref 95–110)
CHLORIDE SERPL-SCNC: 107 MMOL/L (ref 95–110)
CO2 SERPL-SCNC: 21 MMOL/L (ref 23–29)
CO2 SERPL-SCNC: 26 MMOL/L (ref 23–29)
CREAT SERPL-MCNC: 1.8 MG/DL (ref 0.5–1.4)
CREAT SERPL-MCNC: 1.9 MG/DL (ref 0.5–1.4)
EST. GFR  (NO RACE VARIABLE): 44 ML/MIN/1.73 M^2
EST. GFR  (NO RACE VARIABLE): 46 ML/MIN/1.73 M^2
GLUCOSE SERPL-MCNC: 102 MG/DL (ref 70–110)
GLUCOSE SERPL-MCNC: 116 MG/DL (ref 70–110)
POTASSIUM SERPL-SCNC: 2.8 MMOL/L (ref 3.5–5.1)
POTASSIUM SERPL-SCNC: 3.7 MMOL/L (ref 3.5–5.1)
SODIUM SERPL-SCNC: 136 MMOL/L (ref 136–145)
SODIUM SERPL-SCNC: 138 MMOL/L (ref 136–145)

## 2023-09-06 PROCEDURE — 36415 COLL VENOUS BLD VENIPUNCTURE: CPT | Performed by: HOSPITALIST

## 2023-09-06 PROCEDURE — 25000003 PHARM REV CODE 250: Performed by: REGISTERED NURSE

## 2023-09-06 PROCEDURE — 80048 BASIC METABOLIC PNL TOTAL CA: CPT | Performed by: HOSPITALIST

## 2023-09-06 PROCEDURE — 80048 BASIC METABOLIC PNL TOTAL CA: CPT | Mod: 91 | Performed by: HOSPITALIST

## 2023-09-06 PROCEDURE — 25000003 PHARM REV CODE 250: Performed by: HOSPITALIST

## 2023-09-06 PROCEDURE — 63600175 PHARM REV CODE 636 W HCPCS: Performed by: HOSPITALIST

## 2023-09-06 RX ORDER — HYDRALAZINE HYDROCHLORIDE 100 MG/1
100 TABLET, FILM COATED ORAL EVERY 8 HOURS
Qty: 90 TABLET | Refills: 2 | Status: SHIPPED | OUTPATIENT
Start: 2023-09-06 | End: 2023-12-05

## 2023-09-06 RX ORDER — HYDRALAZINE HYDROCHLORIDE 25 MG/1
100 TABLET, FILM COATED ORAL EVERY 8 HOURS
Status: DISCONTINUED | OUTPATIENT
Start: 2023-09-06 | End: 2023-09-06 | Stop reason: HOSPADM

## 2023-09-06 RX ORDER — AMLODIPINE BESYLATE 10 MG/1
10 TABLET ORAL DAILY
Qty: 30 TABLET | Refills: 2 | Status: SHIPPED | OUTPATIENT
Start: 2023-09-06 | End: 2023-12-05

## 2023-09-06 RX ADMIN — ONDANSETRON 4 MG: 2 INJECTION INTRAMUSCULAR; INTRAVENOUS at 01:09

## 2023-09-06 RX ADMIN — POTASSIUM BICARBONATE 50 MEQ: 977.5 TABLET, EFFERVESCENT ORAL at 09:09

## 2023-09-06 RX ADMIN — POTASSIUM CHLORIDE AND SODIUM CHLORIDE: 450; 150 INJECTION, SOLUTION INTRAVENOUS at 05:09

## 2023-09-06 RX ADMIN — POTASSIUM BICARBONATE 50 MEQ: 977.5 TABLET, EFFERVESCENT ORAL at 08:09

## 2023-09-06 RX ADMIN — HYDROCODONE BITARTRATE AND ACETAMINOPHEN 1 TABLET: 5; 325 TABLET ORAL at 02:09

## 2023-09-06 RX ADMIN — HYDRALAZINE HYDROCHLORIDE 50 MG: 25 TABLET, FILM COATED ORAL at 05:09

## 2023-09-06 RX ADMIN — AMLODIPINE BESYLATE 10 MG: 5 TABLET ORAL at 08:09

## 2023-09-06 NOTE — PLAN OF CARE
Problem: Adult Inpatient Plan of Care  Goal: Plan of Care Review  Outcome: Ongoing, Progressing  Goal: Patient-Specific Goal (Individualized)  Outcome: Ongoing, Progressing  Goal: Absence of Hospital-Acquired Illness or Injury  Outcome: Ongoing, Progressing  Goal: Optimal Comfort and Wellbeing  Outcome: Ongoing, Progressing  Goal: Readiness for Transition of Care  Outcome: Ongoing, Progressing     Problem: Fatigue  Goal: Improved Activity Tolerance  Outcome: Ongoing, Progressing     Problem: Fluid and Electrolyte Imbalance (Acute Kidney Injury/Impairment)  Goal: Fluid and Electrolyte Balance  Outcome: Ongoing, Progressing     Problem: Oral Intake Inadequate (Acute Kidney Injury/Impairment)  Goal: Optimal Nutrition Intake  Outcome: Ongoing, Progressing     Problem: Renal Function Impairment (Acute Kidney Injury/Impairment)  Goal: Effective Renal Function  Outcome: Ongoing, Progressing      [New Patient Visit] : a new patient visit [Referred By: _________] : Patient was referred by [facility]RA

## 2023-09-06 NOTE — NURSING
AVS virtually reviewed with patient in its entirety with emphasis on medications, follow-up appointments and reasons to return to the ED or contact the [facility] On Call Nurse Care Line. Patient also encouraged to utilize their patient portal. Ease and convenience of use reiterated. Education complete and patient voiced understanding. All questions answered. Discharge teaching complete.

## 2023-09-06 NOTE — DISCHARGE SUMMARY
"[facility] Medicine  Discharge Summary      Patient Name: Jayson Berkowitz  MRN: [id number]  [facility]: [id number]  Patient Class: IP- Inpatient  Admission Date: 9/4/2023  Hospital Length of Stay: 2 days  Discharge Date and Time:  09/06/2023 11:40 AM  Attending Physician: Allie Tovar, *   Discharging Provider: Allie Tovar MD  Primary Care Provider: [facility] -    Primary Care Team: Networked reference to record PCT     HPI:   This is a 48 y.o. M who has a PMHx of Addiction to drug, Gastritis, GERD, HTN, and Renal disorder who presents to the ED for emergent evaluation of acute left lower back pain, and right sided back pain that began 4 days ago. Pt also complains of nausea, vomiting, an epigastric abdominal pain since smoking "mojo" marijuana 4 days ago. Pt states that he was once "addicted to mojo" 3-4 years ago. He smoked mojo for the first time in 3-4 years 4 days ago. He attributes the abdominal pain to vomiting. He endorses occasional subjective fever that he attributes to mojo. Pt also reports oliguria for the last 4 days. Pt has HTN, but states that he ran out of Amlodipine 1 month ago. He also reports running out of potassium. He has not been taking any prescribed medications. Pt denies dysuria, or diarrhea.patient has Cr. Of 7.5,baseline about 2.5.,bladder scan show no sign of retention,he received 2 liter bolus of fluid and has been started on IVF,nephrology is consulted,ordered CT renal.      * No surgery found *      Hospital Course:   This is a 48 y.o. M who has a PMHx of Addiction to drug, Gastritis, GERD, HTN, and Renal disorder who presents to the ED for emergent evaluation of acute left lower back pain, and right sided back pain that began 4 days ago. Pt also complains of nausea, vomiting, an epigastric abdominal pain since smoking "mojo" marijuana 4 days ago. . He attributes the abdominal pain to vomiting. He endorses occasional subjective fever that " he attributes to mojo. Pt also reports oliguria for the last 4 days. Pt has HTN, but states that he ran out of Amlodipine 1 month ago.  Pt denies dysuria, or diarrhea.patient has Cr. Of 7.5,baseline about 2.5.,bladder scan show no sign of retention,he received 2 liter bolus of fluid and has been started on IVF,nephrology is consulted, CT renal show no sign obstructive uropathy.  U tox is positive for opoid,consulted avoid illicit drug use.  ARF with IVF is much improved.  Replaced  potasium.  At DC time his Cr. Was on baseline,hypokalemia is resolved,  Patient was discharged home with new BP med;s and follow up with PCP as out patient.       Goals of Care Treatment Preferences:  Code Status: Full Code      Consults:   Consults (From admission, onward)        Status Ordering Provider     Inpatient consult to Nephrology  Once        Provider:  (Not yet assigned)    Acknowledged ASHLEE NAJERA          No new Assessment & Plan notes have been filed under this hospital service since the last note was generated.  Service: Hospital Medicine    Final Active Diagnoses:    Diagnosis Date Noted POA    PRINCIPAL PROBLEM:  Acute renal failure superimposed on stage 3 chronic kidney disease [N17.9, N18.30] 09/04/2023 Yes    Stage 3 chronic kidney disease [N18.30] 09/04/2023 Yes    Hypokalemia [E87.6] 09/04/2023 Yes    Opioid abuse [F11.10] 09/04/2023 Yes    Essential hypertension [I10] 10/13/2019 Yes     Chronic    Tobacco abuse [Z72.0] 08/23/2019 Yes      Problems Resolved During this Admission:       Discharged Condition: stable    Disposition: Home or Self Care    Follow Up:   Follow-up Information     [facility] -. Schedule an appointment as soon as possible for a visit.    Contact information:  [address] [place] [address] [phone]                       Patient Instructions:   No discharge procedures on file.    Significant Diagnostic Studies: Labs:   BMP:   Recent Labs   Lab  "09/05/23  1659 09/06/23  0439 09/06/23  0959    102 116*    136 138   K 2.6* 2.8* 3.7    107 104   CO2 23 21* 26   BUN 36* 31* 27*   CREATININE 2.5* 1.9* 1.8*   CALCIUM 9.1 9.0 9.4   , CMP   Recent Labs   Lab 09/05/23 1659 09/06/23  0439 09/06/23  0959    136 138   K 2.6* 2.8* 3.7    107 104   CO2 23 21* 26    102 116*   BUN 36* 31* 27*   CREATININE 2.5* 1.9* 1.8*   CALCIUM 9.1 9.0 9.4   ANIONGAP 12 8 8    and CBC No results for input(s): "WBC", "HGB", "HCT", "PLT" in the last 48 hours.  Radiology: X-Ray: CXR: X-Ray Chest 1 View (CXR): No results found for this visit on 09/04/23. and X-Ray Chest PA and Lateral (CXR): No results found for this visit on 09/04/23.    Pending Diagnostic Studies:     Procedure Component Value Units Date/Time    SAKINA [[id number]] Collected: 09/04/23 1752    Order Status: Sent Lab Status: In process Updated: 09/05/23 0957    Specimen: Blood          Medications:  Reconciled Home Medications:      Medication List      START taking these medications    hydrALAZINE 100 MG tablet  Commonly known as: APRESOLINE  Take 1 tablet (100 mg total) by mouth every 8 (eight) hours.        CONTINUE taking these medications    amLODIPine 10 MG tablet  Commonly known as: NORVASC  Take 1 tablet (10 mg total) by mouth once daily.     esomeprazole 20 MG capsule  Commonly known as: NEXIUM  TAKE 1 CAPSULE BY MOUTH EVERY DAY BEFORE BREAKFAST     ketoconazole 2 % cream  Commonly known as: NIZORAL  Apply topically 2 (two) times daily. Apply to affected areas of skin for tinea cruris        STOP taking these medications    losartan-hydrochlorothiazide 50-12.5 mg 50-12.5 mg per tablet  Commonly known as: HYZAAR     naproxen 500 MG tablet  Commonly known as: NAPROSYN        ASK your doctor about these medications    albuterol 90 mcg/actuation inhaler  Commonly known as: PROVENTIL/VENTOLIN HFA  Inhale 1-2 puffs into the lungs every 6 (six) hours as needed for Wheezing. Rescue   "   ondansetron 4 MG tablet  Commonly known as: ZOFRAN  Take 1 tablet (4 mg total) by mouth every 6 (six) hours.     promethazine 25 MG suppository  Commonly known as: PHENERGAN  Place 1 suppository (25 mg total) rectally every 6 (six) hours as needed for Nausea.            Indwelling Lines/Drains at time of discharge:   Lines/Drains/Airways     None                 Time spent on the discharge of patient:  Over 30  minutes         Allie Tovar MD  Department of [facility] Medicine  [facility]

## 2023-09-06 NOTE — PLAN OF CARE
Problem: Adult Inpatient Plan of Care  Goal: Plan of Care Review  Outcome: Met  Goal: Patient-Specific Goal (Individualized)  Outcome: Met  Goal: Absence of Hospital-Acquired Illness or Injury  Outcome: Met  Goal: Optimal Comfort and Wellbeing  Outcome: Met  Goal: Readiness for Transition of Care  Outcome: Met     Problem: Fatigue  Goal: Improved Activity Tolerance  Outcome: Met     Problem: Fluid and Electrolyte Imbalance (Acute Kidney Injury/Impairment)  Goal: Fluid and Electrolyte Balance  Outcome: Met     Problem: Oral Intake Inadequate (Acute Kidney Injury/Impairment)  Goal: Optimal Nutrition Intake  Outcome: Met     Problem: Renal Function Impairment (Acute Kidney Injury/Impairment)  Goal: Effective Renal Function  Outcome: Met

## 2023-09-06 NOTE — PLAN OF CARE
KENNEDI notified nurse Ilene that patient is ready for discharge from case management standpoint.        09/06/23 0908   Final Note   Assessment Type Final Discharge Note   Anticipated Discharge Disposition Home   What phone number can be called within the next 1-3 days to see how you are doing after discharge? [phone]   Post-Acute Status   Post-Acute Authorization Other   Coverage Uninsured   Other Status No Post-Acute Service Needs   Discharge Delays None known at this time

## 2023-09-06 NOTE — NURSING
[facility]  Nurses Note -- 4 Eyes      9/6/2023       Skin assessed on: Q Shift      [x] No Pressure Injuries Present    [x]Prevention Measures Documented    [] Yes LDA  for Pressure Injury Previously documented     [] Yes New Pressure Injury Discovered   [] LDA for New Pressure Injury Added      Attending RN:  Ilene Del Angel, RN     Second RN:  Madison RAMIREZ RN

## 2023-09-06 NOTE — PROGRESS NOTES
Unfortunate 48 y o m who was admitted with back pain, oliguria and n-v x 4 days. Apparently all this stared after smoking MOJO     Feeling better     Denies CNS ENT CP GI  RHEUM OR DERM SX  Past Medical History:   Diagnosis Date    Addiction to drug     Gastritis     GERD (gastroesophageal reflux disease)     Hallucination     Hypertension     Renal disorder     Sleep difficulties     Substance abuse      Past Surgical History:   Procedure Laterality Date    LEG SURGERY Right     NO PAST SURGERIES  11/21/2019     Review of patient's allergies indicates:  No Known Allergies  Social History     Socioeconomic History    Marital status: Legally     Number of children: 7   Occupational History    Occupation: operation   Tobacco Use    Smoking status: Former     Current packs/day: 0.25     Average packs/day: 0.3 packs/day for 3.0 years (0.8 ttl pk-yrs)     Types: Cigarettes    Smokeless tobacco: Never   Substance and Sexual Activity    Alcohol use: Yes     Comment: socially    Drug use: Yes     Types: Marijuana     Comment: mojo daily    Sexual activity: Yes     Partners: Female     Birth control/protection: None   Other Topics Concern    Patient feels they ought to cut down on drinking/drug use No    Patient annoyed by others criticizing their drinking/drug use No    Patient has felt bad or guilty about drinking/drug use No    Patient has had a drink/used drugs as an eye opener in the AM No   Social History Narrative    Estranged from wife.    Lives with girlfriend and their baby.    Has 7 children with multiple mothers.     Works at [employer]    High school graduate.     Smokes Mojo daily.      Social Determinants of Health     Financial Resource Strain: Medium Risk (9/5/2023)    Overall Financial Resource Strain (CARDIA)     Difficulty of Paying Living Expenses: Somewhat hard   Food Insecurity: No Food Insecurity (9/5/2023)    Hunger Vital Sign     Worried About Running Out of Food in the Last  Year: Never true     Ran Out of Food in the Last Year: Never true   Transportation Needs: No Transportation Needs (9/5/2023)    PRAPARE - Transportation     Lack of Transportation (Medical): No     Lack of Transportation (Non-Medical): No   Physical Activity: Inactive (9/5/2023)    Exercise Vital Sign     Days of Exercise per Week: 0 days     Minutes of Exercise per Session: 0 min   Stress: Stress Concern Present (9/5/2023)    [place] [employer] - Occupational Stress Questionnaire     Feeling of Stress : To some extent   Social Connections: Socially Isolated (9/5/2023)    Social Connection and Isolation Panel [NHANES]     Frequency of Communication with Friends and Family: Three times a week     Frequency of Social Gatherings with Friends and Family: Three times a week     Attends Taoism Services: Never     Active Member of Clubs or Organizations: No     Attends Club or Organization Meetings: Never     Marital Status: Never    Housing Stability: Low Risk  (9/5/2023)    Housing Stability Vital Sign     Unable to Pay for Housing in the Last Year: No     Number of Places Lived in the Last Year: 1     Unstable Housing in the Last Year: No     Family History   Problem Relation Age of Onset    Arthritis Mother     Hearing loss Father     Crohn's disease Sister        Current Facility-Administered Medications   Medication    0.45% NaCl with KCl 20 mEq infusion    amLODIPine tablet 10 mg    cloNIDine tablet 0.2 mg    hydrALAZINE tablet 100 mg    HYDROcodone-acetaminophen 5-325 mg per tablet 1 tablet    ondansetron injection 4 mg       LABS    Recent Results (from the past 24 hour(s))   Basic metabolic panel    Collection Time: 09/05/23  4:59 PM   Result Value Ref Range    Sodium 138 136 - 145 mmol/L    Potassium 2.6 (LL) 3.5 - 5.1 mmol/L    Chloride 103 95 - 110 mmol/L    CO2 23 23 - 29 mmol/L    Glucose 104 70 - 110 mg/dL    BUN 36 (H) 6 - 20 mg/dL    Creatinine 2.5 (H) 0.5 - 1.4 mg/dL     Calcium 9.1 8.7 - 10.5 mg/dL    Anion Gap 12 8 - 16 mmol/L    eGFR 31 (A) >60 mL/min/1.73 m^2   Basic metabolic panel    Collection Time: 09/06/23  4:39 AM   Result Value Ref Range    Sodium 136 136 - 145 mmol/L    Potassium 2.8 (L) 3.5 - 5.1 mmol/L    Chloride 107 95 - 110 mmol/L    CO2 21 (L) 23 - 29 mmol/L    Glucose 102 70 - 110 mg/dL    BUN 31 (H) 6 - 20 mg/dL    Creatinine 1.9 (H) 0.5 - 1.4 mg/dL    Calcium 9.0 8.7 - 10.5 mg/dL    Anion Gap 8 8 - 16 mmol/L    eGFR 44 (A) >60 mL/min/1.73 m^2   Basic metabolic panel    Collection Time: 09/06/23  9:59 AM   Result Value Ref Range    Sodium 138 136 - 145 mmol/L    Potassium 3.7 3.5 - 5.1 mmol/L    Chloride 104 95 - 110 mmol/L    CO2 26 23 - 29 mmol/L    Glucose 116 (H) 70 - 110 mg/dL    BUN 27 (H) 6 - 20 mg/dL    Creatinine 1.8 (H) 0.5 - 1.4 mg/dL    Calcium 9.4 8.7 - 10.5 mg/dL    Anion Gap 8 8 - 16 mmol/L    eGFR 46 (A) >60 mL/min/1.73 m^2   ]    I/O last 3 completed shifts:  In: 840 [P.O.:840]  Out: 1850 [Urine:1000; Emesis/NG output:850]    Vitals:    09/05/23 2358 09/06/23 0257 09/06/23 0423 09/06/23 0731   BP: 131/67  129/71 (!) 163/78   Pulse: 88  74 86   Resp: 18 18 18 18   Temp: 98.5 °F (36.9 °C)  99.1 °F (37.3 °C) 98.5 °F (36.9 °C)   TempSrc: Oral  Oral Oral   SpO2: 95%  96% (!) 93%   Weight:       Height:           No Jvd, Thyromegaly or Lymphadenopathy  Lungs: Fairly clear anteriorly and laterally  Cor: RRR no G or rubs  Abd: Soft benign good bowel sounds non tender  Ext: No E C C    A)    ADINA likely secondary to dehydration and or MOJO, creat better   HypoK with normal mag and no alkalosis  HTN off his meds x 1 mo prior to admt   Substance abuse   N-V on admit    Admit lab suggested volume contraction     P)    Renal Diet  Home meds  Protect access  No HD for now   EPO not needed   Binders  Adjust all meds to the degree of renal fx  Close follow up I/O and weights  Maintain Hydration    Must stop the use of MOJO and or like substances (pt aware of my  recommendations)

## 2023-09-07 ENCOUNTER — PATIENT OUTREACH (OUTPATIENT)
Dept: ADMINISTRATIVE | Facility: CLINIC | Age: 48
End: 2023-09-07
Payer: MEDICAID

## 2023-09-07 NOTE — PROGRESS NOTES
C3 nurse attempted to contact Jayson Berkowitz for a TCC post hospital discharge follow up call. No answer. The patient does not have a scheduled HOSFU appointment, and the pt does not have an [facility] PCP.

## 2023-09-08 NOTE — PROGRESS NOTES
2nd attempt to make TCC Call. Left voicemail. Please call [phone] leave your first and last name and date of birth and ask for Marcia. I will return your call as soon as possible.

## 2023-09-13 ENCOUNTER — PATIENT MESSAGE (OUTPATIENT)
Dept: ADMINISTRATIVE | Facility: HOSPITAL | Age: 48
End: 2023-09-13
Payer: MEDICAID

## 2023-10-09 ENCOUNTER — HOSPITAL ENCOUNTER (INPATIENT)
Facility: HOSPITAL | Age: 48
LOS: 2 days | Discharge: HOME | DRG: 684 | End: 2023-10-11
Attending: EMERGENCY MEDICINE | Admitting: STUDENT IN AN ORGANIZED HEALTH CARE EDUCATION/TRAINING PROGRAM
Payer: MEDICAID

## 2023-10-09 DIAGNOSIS — N18.9 ACUTE KIDNEY INJURY SUPERIMPOSED ON CHRONIC KIDNEY DISEASE: Primary | ICD-10-CM

## 2023-10-09 DIAGNOSIS — R11.10 VOMITING: ICD-10-CM

## 2023-10-09 DIAGNOSIS — R94.31 ABNORMAL EKG: ICD-10-CM

## 2023-10-09 DIAGNOSIS — N17.9 AKI (ACUTE KIDNEY INJURY): ICD-10-CM

## 2023-10-09 DIAGNOSIS — R07.9 CHEST PAIN: ICD-10-CM

## 2023-10-09 DIAGNOSIS — E86.0 DEHYDRATION: ICD-10-CM

## 2023-10-09 DIAGNOSIS — E87.6 HYPOKALEMIA: ICD-10-CM

## 2023-10-09 DIAGNOSIS — N17.9 ACUTE KIDNEY INJURY SUPERIMPOSED ON CHRONIC KIDNEY DISEASE: Primary | ICD-10-CM

## 2023-10-09 DIAGNOSIS — F12.188 CANNABIS HYPEREMESIS SYNDROME CONCURRENT WITH AND DUE TO CANNABIS ABUSE: ICD-10-CM

## 2023-10-09 LAB
ALBUMIN SERPL BCP-MCNC: 4.1 G/DL (ref 3.5–5.2)
ALP SERPL-CCNC: 111 U/L (ref 55–135)
ALT SERPL W/O P-5'-P-CCNC: 17 U/L (ref 10–44)
AMPHET+METHAMPHET UR QL: NEGATIVE
ANION GAP SERPL CALC-SCNC: 13 MMOL/L (ref 8–16)
AST SERPL-CCNC: 15 U/L (ref 10–40)
BACTERIA #/AREA URNS HPF: ABNORMAL /HPF
BARBITURATES UR QL SCN>200 NG/ML: NEGATIVE
BASOPHILS # BLD AUTO: 0.03 K/UL (ref 0–0.2)
BASOPHILS NFR BLD: 0.3 % (ref 0–1.9)
BENZODIAZ UR QL SCN>200 NG/ML: NEGATIVE
BILIRUB SERPL-MCNC: 0.4 MG/DL (ref 0.1–1)
BILIRUB UR QL STRIP: NEGATIVE
BUN SERPL-MCNC: 32 MG/DL (ref 6–20)
BZE UR QL SCN: NEGATIVE
CALCIUM SERPL-MCNC: 9.1 MG/DL (ref 8.7–10.5)
CANNABINOIDS UR QL SCN: ABNORMAL
CAOX CRY URNS QL MICRO: ABNORMAL
CHLORIDE SERPL-SCNC: 111 MMOL/L (ref 95–110)
CK SERPL-CCNC: 112 U/L (ref 20–200)
CLARITY UR: ABNORMAL
CO2 SERPL-SCNC: 20 MMOL/L (ref 23–29)
COLOR UR: YELLOW
CREAT SERPL-MCNC: 2.4 MG/DL (ref 0.5–1.4)
CREAT UR-MCNC: 392.3 MG/DL (ref 23–375)
CTP QC/QA: YES
CTP QC/QA: YES
DIFFERENTIAL METHOD: ABNORMAL
EOSINOPHIL # BLD AUTO: 0 K/UL (ref 0–0.5)
EOSINOPHIL NFR BLD: 0 % (ref 0–8)
ERYTHROCYTE [DISTWIDTH] IN BLOOD BY AUTOMATED COUNT: 15 % (ref 11.5–14.5)
EST. GFR  (NO RACE VARIABLE): 33 ML/MIN/1.73 M^2
ETHANOL SERPL-MCNC: <10 MG/DL
GLUCOSE SERPL-MCNC: 141 MG/DL (ref 70–110)
GLUCOSE UR QL STRIP: ABNORMAL
HCT VFR BLD AUTO: 41.9 % (ref 40–54)
HGB BLD-MCNC: 14 G/DL (ref 14–18)
HGB UR QL STRIP: NEGATIVE
HYALINE CASTS #/AREA URNS LPF: >10 /LPF
IMM GRANULOCYTES # BLD AUTO: 0.02 K/UL (ref 0–0.04)
IMM GRANULOCYTES NFR BLD AUTO: 0.2 % (ref 0–0.5)
KETONES UR QL STRIP: NEGATIVE
LEUKOCYTE ESTERASE UR QL STRIP: NEGATIVE
LYMPHOCYTES # BLD AUTO: 1.2 K/UL (ref 1–4.8)
LYMPHOCYTES NFR BLD: 13.6 % (ref 18–48)
MAGNESIUM SERPL-MCNC: 2.1 MG/DL (ref 1.6–2.6)
MCH RBC QN AUTO: 29.4 PG (ref 27–31)
MCHC RBC AUTO-ENTMCNC: 33.4 G/DL (ref 32–36)
MCV RBC AUTO: 88 FL (ref 82–98)
METHADONE UR QL SCN>300 NG/ML: NEGATIVE
MICROSCOPIC COMMENT: ABNORMAL
MONOCYTES # BLD AUTO: 0.5 K/UL (ref 0.3–1)
MONOCYTES NFR BLD: 5.5 % (ref 4–15)
NEUTROPHILS # BLD AUTO: 7.1 K/UL (ref 1.8–7.7)
NEUTROPHILS NFR BLD: 80.4 % (ref 38–73)
NITRITE UR QL STRIP: NEGATIVE
NRBC BLD-RTO: 0 /100 WBC
OPIATES UR QL SCN: ABNORMAL
PCP UR QL SCN>25 NG/ML: NEGATIVE
PH UR STRIP: 6 [PH] (ref 5–8)
PLATELET # BLD AUTO: 251 K/UL (ref 150–450)
PMV BLD AUTO: 9.4 FL (ref 9.2–12.9)
POC MOLECULAR INFLUENZA A AGN: NEGATIVE
POC MOLECULAR INFLUENZA B AGN: NEGATIVE
POTASSIUM SERPL-SCNC: 2.7 MMOL/L (ref 3.5–5.1)
PROT SERPL-MCNC: 8 G/DL (ref 6–8.4)
PROT UR QL STRIP: ABNORMAL
RBC # BLD AUTO: 4.77 M/UL (ref 4.6–6.2)
RBC #/AREA URNS HPF: 24 /HPF (ref 0–4)
SARS-COV-2 RDRP RESP QL NAA+PROBE: NEGATIVE
SODIUM SERPL-SCNC: 144 MMOL/L (ref 136–145)
SP GR UR STRIP: 1.02 (ref 1–1.03)
SQUAMOUS #/AREA URNS HPF: 2 /HPF
TOXICOLOGY INFORMATION: ABNORMAL
TROPONIN I SERPL DL<=0.01 NG/ML-MCNC: 0.03 NG/ML (ref 0–0.03)
URN SPEC COLLECT METH UR: ABNORMAL
UROBILINOGEN UR STRIP-ACNC: NEGATIVE EU/DL
WBC # BLD AUTO: 8.85 K/UL (ref 3.9–12.7)
WBC #/AREA URNS HPF: 9 /HPF (ref 0–5)

## 2023-10-09 PROCEDURE — 81000 URINALYSIS NONAUTO W/SCOPE: CPT | Mod: 59 | Performed by: EMERGENCY MEDICINE

## 2023-10-09 PROCEDURE — 87635 SARS-COV-2 COVID-19 AMP PRB: CPT | Performed by: EMERGENCY MEDICINE

## 2023-10-09 PROCEDURE — 12000002 HC ACUTE/MED SURGE SEMI-PRIVATE ROOM

## 2023-10-09 PROCEDURE — 93010 EKG 12-LEAD: ICD-10-PCS | Mod: ,,, | Performed by: INTERNAL MEDICINE

## 2023-10-09 PROCEDURE — 96365 THER/PROPH/DIAG IV INF INIT: CPT

## 2023-10-09 PROCEDURE — 82550 ASSAY OF CK (CPK): CPT | Performed by: EMERGENCY MEDICINE

## 2023-10-09 PROCEDURE — 93005 ELECTROCARDIOGRAM TRACING: CPT

## 2023-10-09 PROCEDURE — 80307 DRUG TEST PRSMV CHEM ANLYZR: CPT | Performed by: EMERGENCY MEDICINE

## 2023-10-09 PROCEDURE — 80053 COMPREHEN METABOLIC PANEL: CPT | Performed by: EMERGENCY MEDICINE

## 2023-10-09 PROCEDURE — 99285 EMERGENCY DEPT VISIT HI MDM: CPT | Mod: 25

## 2023-10-09 PROCEDURE — 84484 ASSAY OF TROPONIN QUANT: CPT | Performed by: EMERGENCY MEDICINE

## 2023-10-09 PROCEDURE — 82077 ASSAY SPEC XCP UR&BREATH IA: CPT | Performed by: EMERGENCY MEDICINE

## 2023-10-09 PROCEDURE — 85025 COMPLETE CBC W/AUTO DIFF WBC: CPT | Performed by: EMERGENCY MEDICINE

## 2023-10-09 PROCEDURE — 96366 THER/PROPH/DIAG IV INF ADDON: CPT

## 2023-10-09 PROCEDURE — 83735 ASSAY OF MAGNESIUM: CPT | Performed by: EMERGENCY MEDICINE

## 2023-10-09 PROCEDURE — 93010 ELECTROCARDIOGRAM REPORT: CPT | Mod: ,,, | Performed by: INTERNAL MEDICINE

## 2023-10-09 RX ORDER — MAGNESIUM SULFATE HEPTAHYDRATE 40 MG/ML
2 INJECTION, SOLUTION INTRAVENOUS ONCE
Status: COMPLETED | OUTPATIENT
Start: 2023-10-10 | End: 2023-10-10

## 2023-10-09 RX ADMIN — MAGNESIUM SULFATE HEPTAHYDRATE 2 G: 40 INJECTION, SOLUTION INTRAVENOUS at 11:10

## 2023-10-09 NOTE — Clinical Note
Diagnosis: Hypokalemia [199086]   Admitting Provider:: MAYNOR VÁZQUEZ [[id number]]   Future Attending Provider: MARIO CLAIRE [[id number]]   Reason for IP Medical Treatment  (Clinical interventions that can only be accomplished in the IP setting? ) :: hypokalemia, prolonged QTC   I certify that Inpatient services for greater than or equal to 2 midnights are medically necessary:: Yes   Plans for Post-Acute care--if anticipated (pick the single best option):: A. No post acute care anticipated at this time

## 2023-10-10 PROBLEM — R94.31 PROLONGED Q-T INTERVAL ON ECG: Status: ACTIVE | Noted: 2023-10-10

## 2023-10-10 PROBLEM — N18.9 ACUTE KIDNEY INJURY SUPERIMPOSED ON CHRONIC KIDNEY DISEASE: Status: ACTIVE | Noted: 2023-09-04

## 2023-10-10 LAB
ANION GAP SERPL CALC-SCNC: 9 MMOL/L (ref 8–16)
BUN SERPL-MCNC: 25 MG/DL (ref 6–20)
CALCIUM SERPL-MCNC: 9.1 MG/DL (ref 8.7–10.5)
CHLORIDE SERPL-SCNC: 111 MMOL/L (ref 95–110)
CO2 SERPL-SCNC: 22 MMOL/L (ref 23–29)
CREAT SERPL-MCNC: 1.6 MG/DL (ref 0.5–1.4)
EST. GFR  (NO RACE VARIABLE): 53 ML/MIN/1.73 M^2
GLUCOSE SERPL-MCNC: 124 MG/DL (ref 70–110)
MAGNESIUM SERPL-MCNC: 2.4 MG/DL (ref 1.6–2.6)
PHOSPHATE SERPL-MCNC: 2.9 MG/DL (ref 2.7–4.5)
POTASSIUM SERPL-SCNC: 2.9 MMOL/L (ref 3.5–5.1)
SODIUM SERPL-SCNC: 142 MMOL/L (ref 136–145)

## 2023-10-10 PROCEDURE — 21400001 HC TELEMETRY ROOM

## 2023-10-10 PROCEDURE — 96375 TX/PRO/DX INJ NEW DRUG ADDON: CPT

## 2023-10-10 PROCEDURE — 93010 ELECTROCARDIOGRAM REPORT: CPT | Mod: ,,, | Performed by: INTERNAL MEDICINE

## 2023-10-10 PROCEDURE — 63600175 PHARM REV CODE 636 W HCPCS: Performed by: EMERGENCY MEDICINE

## 2023-10-10 PROCEDURE — 63600175 PHARM REV CODE 636 W HCPCS: Performed by: HOSPITALIST

## 2023-10-10 PROCEDURE — 83735 ASSAY OF MAGNESIUM: CPT | Performed by: STUDENT IN AN ORGANIZED HEALTH CARE EDUCATION/TRAINING PROGRAM

## 2023-10-10 PROCEDURE — 84100 ASSAY OF PHOSPHORUS: CPT | Performed by: STUDENT IN AN ORGANIZED HEALTH CARE EDUCATION/TRAINING PROGRAM

## 2023-10-10 PROCEDURE — 96367 TX/PROPH/DG ADDL SEQ IV INF: CPT

## 2023-10-10 PROCEDURE — 63600175 PHARM REV CODE 636 W HCPCS: Performed by: STUDENT IN AN ORGANIZED HEALTH CARE EDUCATION/TRAINING PROGRAM

## 2023-10-10 PROCEDURE — 25000003 PHARM REV CODE 250: Performed by: STUDENT IN AN ORGANIZED HEALTH CARE EDUCATION/TRAINING PROGRAM

## 2023-10-10 PROCEDURE — 93005 ELECTROCARDIOGRAM TRACING: CPT

## 2023-10-10 PROCEDURE — 25000003 PHARM REV CODE 250: Performed by: EMERGENCY MEDICINE

## 2023-10-10 PROCEDURE — 93010 EKG 12-LEAD: ICD-10-PCS | Mod: ,,, | Performed by: INTERNAL MEDICINE

## 2023-10-10 PROCEDURE — 80048 BASIC METABOLIC PNL TOTAL CA: CPT | Performed by: STUDENT IN AN ORGANIZED HEALTH CARE EDUCATION/TRAINING PROGRAM

## 2023-10-10 RX ORDER — IBUPROFEN 200 MG
16 TABLET ORAL
Status: DISCONTINUED | OUTPATIENT
Start: 2023-10-10 | End: 2023-10-11 | Stop reason: HOSPADM

## 2023-10-10 RX ORDER — MORPHINE SULFATE 4 MG/ML
2 INJECTION, SOLUTION INTRAMUSCULAR; INTRAVENOUS ONCE
Status: COMPLETED | OUTPATIENT
Start: 2023-10-10 | End: 2023-10-10

## 2023-10-10 RX ORDER — ONDANSETRON 2 MG/ML
8 INJECTION INTRAMUSCULAR; INTRAVENOUS ONCE
Status: DISCONTINUED | OUTPATIENT
Start: 2023-10-10 | End: 2023-10-11 | Stop reason: HOSPADM

## 2023-10-10 RX ORDER — ACETAMINOPHEN 325 MG/1
650 TABLET ORAL EVERY 4 HOURS PRN
Status: DISCONTINUED | OUTPATIENT
Start: 2023-10-10 | End: 2023-10-11 | Stop reason: HOSPADM

## 2023-10-10 RX ORDER — LANOLIN ALCOHOL/MO/W.PET/CERES
800 CREAM (GRAM) TOPICAL
Status: DISCONTINUED | OUTPATIENT
Start: 2023-10-10 | End: 2023-10-11

## 2023-10-10 RX ORDER — BISACODYL 10 MG
10 SUPPOSITORY, RECTAL RECTAL DAILY PRN
Status: DISCONTINUED | OUTPATIENT
Start: 2023-10-10 | End: 2023-10-10

## 2023-10-10 RX ORDER — SODIUM CHLORIDE AND POTASSIUM CHLORIDE 150; 900 MG/100ML; MG/100ML
INJECTION, SOLUTION INTRAVENOUS CONTINUOUS
Status: DISCONTINUED | OUTPATIENT
Start: 2023-10-10 | End: 2023-10-11

## 2023-10-10 RX ORDER — SODIUM,POTASSIUM PHOSPHATES 280-250MG
2 POWDER IN PACKET (EA) ORAL
Status: DISCONTINUED | OUTPATIENT
Start: 2023-10-10 | End: 2023-10-11

## 2023-10-10 RX ORDER — ACETAMINOPHEN 325 MG/1
650 TABLET ORAL EVERY 8 HOURS PRN
Status: DISCONTINUED | OUTPATIENT
Start: 2023-10-10 | End: 2023-10-10

## 2023-10-10 RX ORDER — ONDANSETRON 2 MG/ML
4 INJECTION INTRAMUSCULAR; INTRAVENOUS EVERY 8 HOURS PRN
Status: DISCONTINUED | OUTPATIENT
Start: 2023-10-10 | End: 2023-10-10

## 2023-10-10 RX ORDER — SODIUM CHLORIDE 0.9 % (FLUSH) 0.9 %
10 SYRINGE (ML) INJECTION EVERY 12 HOURS PRN
Status: DISCONTINUED | OUTPATIENT
Start: 2023-10-10 | End: 2023-10-11 | Stop reason: HOSPADM

## 2023-10-10 RX ORDER — POTASSIUM CHLORIDE 7.45 MG/ML
10 INJECTION INTRAVENOUS ONCE
Status: COMPLETED | OUTPATIENT
Start: 2023-10-10 | End: 2023-10-10

## 2023-10-10 RX ORDER — GLUCAGON 1 MG
1 KIT INJECTION
Status: DISCONTINUED | OUTPATIENT
Start: 2023-10-10 | End: 2023-10-11 | Stop reason: HOSPADM

## 2023-10-10 RX ORDER — POTASSIUM CHLORIDE 7.45 MG/ML
10 INJECTION INTRAVENOUS
Status: DISCONTINUED | OUTPATIENT
Start: 2023-10-10 | End: 2023-10-10

## 2023-10-10 RX ORDER — LORAZEPAM 2 MG/ML
1 INJECTION INTRAMUSCULAR EVERY 6 HOURS PRN
Status: DISCONTINUED | OUTPATIENT
Start: 2023-10-10 | End: 2023-10-11 | Stop reason: HOSPADM

## 2023-10-10 RX ORDER — MAG HYDROX/ALUMINUM HYD/SIMETH 200-200-20
30 SUSPENSION, ORAL (FINAL DOSE FORM) ORAL 4 TIMES DAILY PRN
Status: DISCONTINUED | OUTPATIENT
Start: 2023-10-10 | End: 2023-10-11 | Stop reason: HOSPADM

## 2023-10-10 RX ORDER — POLYETHYLENE GLYCOL 3350 17 G/17G
17 POWDER, FOR SOLUTION ORAL 2 TIMES DAILY PRN
Status: DISCONTINUED | OUTPATIENT
Start: 2023-10-10 | End: 2023-10-11 | Stop reason: HOSPADM

## 2023-10-10 RX ORDER — AMLODIPINE BESYLATE 5 MG/1
10 TABLET ORAL DAILY
Status: DISCONTINUED | OUTPATIENT
Start: 2023-10-10 | End: 2023-10-11 | Stop reason: HOSPADM

## 2023-10-10 RX ORDER — PROCHLORPERAZINE EDISYLATE 5 MG/ML
5 INJECTION INTRAMUSCULAR; INTRAVENOUS EVERY 6 HOURS PRN
Status: DISCONTINUED | OUTPATIENT
Start: 2023-10-10 | End: 2023-10-10

## 2023-10-10 RX ORDER — NALOXONE HCL 0.4 MG/ML
0.02 VIAL (ML) INJECTION
Status: DISCONTINUED | OUTPATIENT
Start: 2023-10-10 | End: 2023-10-11 | Stop reason: HOSPADM

## 2023-10-10 RX ORDER — IBUPROFEN 200 MG
24 TABLET ORAL
Status: DISCONTINUED | OUTPATIENT
Start: 2023-10-10 | End: 2023-10-11 | Stop reason: HOSPADM

## 2023-10-10 RX ORDER — POTASSIUM CHLORIDE 20 MEQ/1
40 TABLET, EXTENDED RELEASE ORAL ONCE
Status: DISCONTINUED | OUTPATIENT
Start: 2023-10-10 | End: 2023-10-10

## 2023-10-10 RX ORDER — HYDRALAZINE HYDROCHLORIDE 20 MG/ML
10 INJECTION INTRAMUSCULAR; INTRAVENOUS EVERY 8 HOURS PRN
Status: DISCONTINUED | OUTPATIENT
Start: 2023-10-10 | End: 2023-10-11 | Stop reason: HOSPADM

## 2023-10-10 RX ORDER — ONDANSETRON 2 MG/ML
8 INJECTION INTRAMUSCULAR; INTRAVENOUS EVERY 6 HOURS PRN
Status: DISCONTINUED | OUTPATIENT
Start: 2023-10-10 | End: 2023-10-10

## 2023-10-10 RX ORDER — TALC
6 POWDER (GRAM) TOPICAL NIGHTLY PRN
Status: DISCONTINUED | OUTPATIENT
Start: 2023-10-10 | End: 2023-10-11 | Stop reason: HOSPADM

## 2023-10-10 RX ORDER — LORAZEPAM 2 MG/ML
1 INJECTION INTRAMUSCULAR ONCE
Status: COMPLETED | OUTPATIENT
Start: 2023-10-10 | End: 2023-10-10

## 2023-10-10 RX ORDER — POLYETHYLENE GLYCOL 3350 17 G/17G
17 POWDER, FOR SOLUTION ORAL DAILY
Status: DISCONTINUED | OUTPATIENT
Start: 2023-10-10 | End: 2023-10-10

## 2023-10-10 RX ORDER — ACETAMINOPHEN 325 MG/1
650 TABLET ORAL EVERY 4 HOURS PRN
Status: DISCONTINUED | OUTPATIENT
Start: 2023-10-10 | End: 2023-10-10

## 2023-10-10 RX ORDER — SIMETHICONE 80 MG
1 TABLET,CHEWABLE ORAL 4 TIMES DAILY PRN
Status: DISCONTINUED | OUTPATIENT
Start: 2023-10-10 | End: 2023-10-11 | Stop reason: HOSPADM

## 2023-10-10 RX ORDER — ONDANSETRON 2 MG/ML
4 INJECTION INTRAMUSCULAR; INTRAVENOUS ONCE
Status: COMPLETED | OUTPATIENT
Start: 2023-10-10 | End: 2023-10-10

## 2023-10-10 RX ORDER — LORAZEPAM 2 MG/ML
1 INJECTION INTRAMUSCULAR
Status: COMPLETED | OUTPATIENT
Start: 2023-10-10 | End: 2023-10-10

## 2023-10-10 RX ORDER — POTASSIUM CHLORIDE 7.45 MG/ML
10 INJECTION INTRAVENOUS
Status: COMPLETED | OUTPATIENT
Start: 2023-10-10 | End: 2023-10-10

## 2023-10-10 RX ORDER — LORAZEPAM 2 MG/ML
0.5 INJECTION INTRAMUSCULAR
Status: COMPLETED | OUTPATIENT
Start: 2023-10-10 | End: 2023-10-10

## 2023-10-10 RX ORDER — HYDRALAZINE HYDROCHLORIDE 25 MG/1
100 TABLET, FILM COATED ORAL EVERY 8 HOURS
Status: DISCONTINUED | OUTPATIENT
Start: 2023-10-10 | End: 2023-10-11 | Stop reason: HOSPADM

## 2023-10-10 RX ORDER — METOCLOPRAMIDE HYDROCHLORIDE 5 MG/ML
5 INJECTION INTRAMUSCULAR; INTRAVENOUS
Status: DISCONTINUED | OUTPATIENT
Start: 2023-10-10 | End: 2023-10-11 | Stop reason: HOSPADM

## 2023-10-10 RX ADMIN — POTASSIUM CHLORIDE 10 MEQ: 7.46 INJECTION, SOLUTION INTRAVENOUS at 02:10

## 2023-10-10 RX ADMIN — LORAZEPAM 1 MG: 2 INJECTION INTRAMUSCULAR; INTRAVENOUS at 10:10

## 2023-10-10 RX ADMIN — POTASSIUM CHLORIDE 10 MEQ: 7.46 INJECTION, SOLUTION INTRAVENOUS at 03:10

## 2023-10-10 RX ADMIN — POTASSIUM BICARBONATE 40 MEQ: 391 TABLET, EFFERVESCENT ORAL at 01:10

## 2023-10-10 RX ADMIN — HYDRALAZINE HYDROCHLORIDE 100 MG: 25 TABLET, FILM COATED ORAL at 09:10

## 2023-10-10 RX ADMIN — METOCLOPRAMIDE 5 MG: 5 INJECTION, SOLUTION INTRAMUSCULAR; INTRAVENOUS at 10:10

## 2023-10-10 RX ADMIN — MORPHINE SULFATE 2 MG: 4 INJECTION, SOLUTION INTRAMUSCULAR; INTRAVENOUS at 03:10

## 2023-10-10 RX ADMIN — POTASSIUM CHLORIDE 10 MEQ: 7.46 INJECTION, SOLUTION INTRAVENOUS at 07:10

## 2023-10-10 RX ADMIN — HYDRALAZINE HYDROCHLORIDE 100 MG: 25 TABLET, FILM COATED ORAL at 04:10

## 2023-10-10 RX ADMIN — LORAZEPAM 0.5 MG: 2 INJECTION INTRAMUSCULAR; INTRAVENOUS at 02:10

## 2023-10-10 RX ADMIN — METOCLOPRAMIDE 5 MG: 5 INJECTION, SOLUTION INTRAMUSCULAR; INTRAVENOUS at 09:10

## 2023-10-10 RX ADMIN — CEFTRIAXONE 1 G: 1 INJECTION, POWDER, FOR SOLUTION INTRAMUSCULAR; INTRAVENOUS at 01:10

## 2023-10-10 RX ADMIN — AMLODIPINE BESYLATE 10 MG: 5 TABLET ORAL at 09:10

## 2023-10-10 RX ADMIN — ONDANSETRON 4 MG: 2 INJECTION INTRAMUSCULAR; INTRAVENOUS at 06:10

## 2023-10-10 RX ADMIN — SODIUM CHLORIDE AND POTASSIUM CHLORIDE: 9; 1.49 INJECTION, SOLUTION INTRAVENOUS at 09:10

## 2023-10-10 RX ADMIN — ONDANSETRON 8 MG: 2 INJECTION INTRAMUSCULAR; INTRAVENOUS at 10:10

## 2023-10-10 RX ADMIN — MORPHINE SULFATE 2 MG: 4 INJECTION, SOLUTION INTRAMUSCULAR; INTRAVENOUS at 10:10

## 2023-10-10 RX ADMIN — SODIUM CHLORIDE, POTASSIUM CHLORIDE, SODIUM LACTATE AND CALCIUM CHLORIDE 1000 ML: 600; 310; 30; 20 INJECTION, SOLUTION INTRAVENOUS at 02:10

## 2023-10-10 RX ADMIN — HYDRALAZINE HYDROCHLORIDE 100 MG: 25 TABLET, FILM COATED ORAL at 02:10

## 2023-10-10 RX ADMIN — POLYETHYLENE GLYCOL 3350 17 G: 17 POWDER, FOR SOLUTION ORAL at 09:10

## 2023-10-10 RX ADMIN — POTASSIUM CHLORIDE 10 MEQ: 7.46 INJECTION, SOLUTION INTRAVENOUS at 05:10

## 2023-10-10 RX ADMIN — METOCLOPRAMIDE 5 MG: 5 INJECTION, SOLUTION INTRAMUSCULAR; INTRAVENOUS at 04:10

## 2023-10-10 RX ADMIN — LORAZEPAM 1 MG: 2 INJECTION INTRAMUSCULAR; INTRAVENOUS at 12:10

## 2023-10-10 NOTE — CARE UPDATE
Patient seen and examined.  See H/P, treatment and plan per Dr. Long.  49 y/o male presents with abdominal pain and intractable nausea/vomiting.  Symptoms likely in the setting of marijuana use/hyperemesis.  ARF with Creat of 2.4 on presentation.  IVF with improving Creat.  Seemed to be doing better this morning and sleeping comfortably.  Now complaining of severe abdominal pain along with nausea and vomiting.  Continue IVF's.  Clear liquid diet.  Supportive care.  Add Reglan before meals.  Advance diet if tolerated.  BP elevated as patient not tolerating PO medications.  Add prn IV Hydralazine.

## 2023-10-10 NOTE — ED NOTES
Pending blood pressure decrease to move to inpatient floor. Patient asleep and is comfortable at this time.

## 2023-10-10 NOTE — HPI
This is a 48-year-old male with a past medical history of hypertension, CKD 3, tobacco use, opioid abuse, marijuana use, who presents with abdominal pain.    Patient presented from [facility] for evaluation no abdominal pain.  He had a recent hospitalization (9/4-9/6) after presenting with epigastric abdominal pain and vomiting, in the setting of marijuana use.  He was found to have hypokalemia and ADINA on CKD which improved with fluids.  Patient returned to the ED for similar symptoms.  He reports that since discharge, he has been having intermittent nausea and vomiting that worsened a day prior to presentation.  He reports that over the last 2 days, he had more than 20 episodes of emesis.  Associated symptoms include abdominal pain and decreased p.o. intake.  He denied chest pain.  He smokes marijuana daily.    In the ED, the patient was hypertensive.  Labs were remarkable for an elevated creatinine (2.4-previously 1.8), hypokalemia (2.7).  UDS was positive for opioids and marijuana.  UA showed 9 WBCs, many bacteria.  EKG demonstrated prolonged QTC.  CT abdomen and pelvis without contrast showed no acute intra-abdominal/pelvic process.  Patient was given potassium bicarbonate 40 mEq, magnesium sulfate 2 g IV, Ativan 1 mg IV, and Rocephin 1 g IV.  He was admitted for further management.   Female

## 2023-10-10 NOTE — H&P
[facility] Emergency Dept  [facility] Medicine  History & Physical    Patient Name: Jayson Berkowitz  MRN: [id number]  Patient Class: IP- Inpatient  Admission Date: 10/9/2023  Attending Physician: Basim Lopez MD   Primary Care Provider: [facility] -         Patient information was obtained from patient and ER records.     Subjective:     Principal Problem:Acute kidney injury superimposed on chronic kidney disease    Chief Complaint:   Chief Complaint   Patient presents with    Abdominal Pain     [facility] 2-3 weeks ago. States this happens every time after he smokes mojo. Pt with elevated creatinine 2.9. report mid abdominal pain and R lower and L upper back pain. States last time he smoked was 2-3 wks ago but the pain didn't go away this time like it usually does. Rcv'd 2L NS PT. +N/V, rcv'd phenergan PTA and kept nausea controlled until arrival when he began retching again.        HPI: This is a 48-year-old male with a past medical history of hypertension, CKD 3, tobacco use, opioid abuse, marijuana use, who presents with abdominal pain.    Patient presented from [facility] for evaluation no abdominal pain.  He had a recent hospitalization (9/4-9/6) after presenting with epigastric abdominal pain and vomiting, in the setting of marijuana use.  He was found to have hypokalemia and ADINA on CKD which improved with fluids.  Patient returned to the ED for similar symptoms.  He reports that since discharge, he has been having intermittent nausea and vomiting that worsened a day prior to presentation.  He reports that over the last 2 days, he had more than 20 episodes of emesis.  Associated symptoms include abdominal pain and decreased p.o. intake.  He denied chest pain.  He smokes marijuana daily.    In the ED, the patient was hypertensive.  Labs were remarkable for an elevated creatinine (2.4-previously 1.8), hypokalemia (2.7).  UDS was positive for opioids and marijuana.   UA showed 9 WBCs, many bacteria.  EKG demonstrated prolonged QTC.  CT abdomen and pelvis without contrast showed no acute intra-abdominal/pelvic process.  Patient was given potassium bicarbonate 40 mEq, magnesium sulfate 2 g IV, Ativan 1 mg IV, and Rocephin 1 g IV.  He was admitted for further management.      Past Medical History:   Diagnosis Date    Addiction to drug     Gastritis     GERD (gastroesophageal reflux disease)     Hallucination     Hypertension     Renal disorder     Sleep difficulties     Substance abuse        Past Surgical History:   Procedure Laterality Date    LEG SURGERY Right     NO PAST SURGERIES  11/21/2019       Review of patient's allergies indicates:  No Known Allergies    No current facility-administered medications on file prior to encounter.     Current Outpatient Medications on File Prior to Encounter   Medication Sig    albuterol (PROVENTIL/VENTOLIN HFA) 90 mcg/actuation inhaler Inhale 1-2 puffs into the lungs every 6 (six) hours as needed for Wheezing. Rescue (Patient not taking: No sig reported)    amLODIPine (NORVASC) 10 MG tablet Take 1 tablet (10 mg total) by mouth once daily.    esomeprazole (NEXIUM) 20 MG capsule TAKE 1 CAPSULE BY MOUTH EVERY DAY BEFORE BREAKFAST    hydrALAZINE (APRESOLINE) 100 MG tablet Take 1 tablet (100 mg total) by mouth every 8 (eight) hours.    ketoconazole (NIZORAL) 2 % cream Apply topically 2 (two) times daily. Apply to affected areas of skin for tinea cruris    ondansetron (ZOFRAN) 4 MG tablet Take 1 tablet (4 mg total) by mouth every 6 (six) hours. (Patient not taking: No sig reported)    promethazine (PHENERGAN) 25 MG suppository Place 1 suppository (25 mg total) rectally every 6 (six) hours as needed for Nausea. (Patient not taking: No sig reported)     Family History       Problem Relation (Age of Onset)    Arthritis Mother    Crohn's disease Sister    Hearing loss Father          Tobacco Use    Smoking status: Former     Current  packs/day: 0.25     Average packs/day: 0.3 packs/day for 3.0 years (0.8 ttl pk-yrs)     Types: Cigarettes    Smokeless tobacco: Never   Substance and Sexual Activity    Alcohol use: Yes     Comment: socially    Drug use: Yes     Types: Marijuana     Comment: mojo daily    Sexual activity: Yes     Partners: Female     Birth control/protection: None     Review of Systems   Constitutional:  Negative for fever.   HENT: Negative.     Eyes: Negative.    Respiratory: Negative.     Cardiovascular: Negative.    Gastrointestinal:  Positive for abdominal pain, nausea and vomiting.   Endocrine: Negative.    Genitourinary: Negative.    Musculoskeletal: Negative.    Skin: Negative.    Allergic/Immunologic: Negative.    Neurological: Negative.    Psychiatric/Behavioral: Negative.       Objective:     Vital Signs (Most Recent):  Temp: 98.7 °F (37.1 °C) (10/09/23 2207)  Pulse: 101 (10/09/23 2302)  Resp: 14 (10/09/23 2302)  BP: (!) 173/97 (10/09/23 2302)  SpO2: 100 % (10/09/23 2302) Vital Signs (24h Range):  Temp:  [98.7 °F (37.1 °C)] 98.7 °F (37.1 °C)  Pulse:  [101-107] 101  Resp:  [14-22] 14  SpO2:  [100 %] 100 %  BP: (167-173)/() 173/97     Weight: 88.5 kg (195 lb)  Body mass index is 25.04 kg/m².     Physical Exam  Vitals and nursing note reviewed.   Constitutional:       General: He is in acute distress.      Appearance: Normal appearance. He is not ill-appearing.   HENT:      Head: Normocephalic and atraumatic.      Nose: Nose normal.      Mouth/Throat:      Mouth: Mucous membranes are moist.   Eyes:      Extraocular Movements: Extraocular movements intact.   Cardiovascular:      Rate and Rhythm: Normal rate.      Pulses: Normal pulses.      Heart sounds: No murmur heard.  Pulmonary:      Effort: Pulmonary effort is normal. No respiratory distress.   Abdominal:      General: Abdomen is flat.      Palpations: Abdomen is soft.      Tenderness: There is abdominal tenderness (mild).   Musculoskeletal:      Right lower  leg: No edema.      Left lower leg: No edema.   Skin:     General: Skin is warm.      Capillary Refill: Capillary refill takes less than 2 seconds.   Neurological:      General: No focal deficit present.      Mental Status: He is alert.   Psychiatric:         Mood and Affect: Mood normal.                Significant Labs: All pertinent labs within the past 24 hours have been reviewed.    Significant Imaging: I have reviewed all pertinent imaging results/findings within the past 24 hours.    Assessment/Plan:     * Acute kidney injury superimposed on chronic kidney disease  Patient with acute kidney injury/acute renal failure likely due to pre-renal azotemia due to IVVD ADINA is currently stable. Baseline creatinine 1.8 - Labs reviewed- Renal function/electrolytes with Estimated Creatinine Clearance: 44.7 mL/min (A) (based on SCr of 2.4 mg/dL (H)). according to latest data. Monitor urine output and serial BMP and adjust therapy as needed. Avoid nephrotoxins and renally dose meds for GFR listed above.    Prolonged Q-T interval on ECG  Initial QTC of 589, improved to 482 after mag.   Avoid QT prolonging agents       Hypokalemia  Replace PRN      Essential hypertension  Resume home medications       Intractable nausea and vomiting  CT A/P negative for acute process  Likely in the setting of marijuana use/hyperemesis  IVF  Replace electrolytes   Symptomatic control         VTE Risk Mitigation (From admission, onward)         Ordered     IP VTE LOW RISK PATIENT  Once         10/10/23 0232     Place sequential compression device  Until discontinued         10/10/23 0232                           Rebel Long MD  Department of Hospital Medicine  [facility] - Emergency Dept

## 2023-10-10 NOTE — ASSESSMENT & PLAN NOTE
CT A/P negative for acute process  Likely in the setting of marijuana use/hyperemesis  IVF  Replace electrolytes   Symptomatic control

## 2023-10-10 NOTE — ASSESSMENT & PLAN NOTE
Patient with acute kidney injury/acute renal failure likely due to pre-renal azotemia due to IVVD ADINA is currently stable. Baseline creatinine 1.8 - Labs reviewed- Renal function/electrolytes with Estimated Creatinine Clearance: 44.7 mL/min (A) (based on SCr of 2.4 mg/dL (H)). according to latest data. Monitor urine output and serial BMP and adjust therapy as needed. Avoid nephrotoxins and renally dose meds for GFR listed above.

## 2023-10-10 NOTE — CONSULTS
[facility] Medicine  Telemedicine Consult Note    Patient Name: Jayson Berkowitz  MRN: [id number]  Admission Date: 10/9/2023  Hospital Length of Stay: 0 days  Attending Physician: Terence Ann MD   Primary Care Provider: [facility] -           Thank you for your consult to [facility]. We have reviewed the patient chart. This patient does meet criteria for [facility] service at this time.  Will assume care on 10/11/23 at 6AM.        Annabella Nix MD  Department of Hospital Medicine   [facility]

## 2023-10-10 NOTE — ED NOTES
49 yo male presents to the ED via EMS with c/o 10/10 epigastric pain, nausea, and vomiting. States he has not been right since he was last discharged from the hospital. Patient is tearful and writhing around in the bed. Patient is AAOx4, VSS. Denies CP, SOB, diarrhea, constipation, cough, fever, numbness, or tingling. Bed locked, in lowest position, bed rails up x2, call light in reach, all monitoring attached.

## 2023-10-10 NOTE — SUBJECTIVE & OBJECTIVE
Past Medical History:   Diagnosis Date    Addiction to drug     Gastritis     GERD (gastroesophageal reflux disease)     Hallucination     Hypertension     Renal disorder     Sleep difficulties     Substance abuse        Past Surgical History:   Procedure Laterality Date    LEG SURGERY Right     NO PAST SURGERIES  11/21/2019       Review of patient's allergies indicates:  No Known Allergies    No current facility-administered medications on file prior to encounter.     Current Outpatient Medications on File Prior to Encounter   Medication Sig    albuterol (PROVENTIL/VENTOLIN HFA) 90 mcg/actuation inhaler Inhale 1-2 puffs into the lungs every 6 (six) hours as needed for Wheezing. Rescue (Patient not taking: No sig reported)    amLODIPine (NORVASC) 10 MG tablet Take 1 tablet (10 mg total) by mouth once daily.    esomeprazole (NEXIUM) 20 MG capsule TAKE 1 CAPSULE BY MOUTH EVERY DAY BEFORE BREAKFAST    hydrALAZINE (APRESOLINE) 100 MG tablet Take 1 tablet (100 mg total) by mouth every 8 (eight) hours.    ketoconazole (NIZORAL) 2 % cream Apply topically 2 (two) times daily. Apply to affected areas of skin for tinea cruris    ondansetron (ZOFRAN) 4 MG tablet Take 1 tablet (4 mg total) by mouth every 6 (six) hours. (Patient not taking: No sig reported)    promethazine (PHENERGAN) 25 MG suppository Place 1 suppository (25 mg total) rectally every 6 (six) hours as needed for Nausea. (Patient not taking: No sig reported)     Family History       Problem Relation (Age of Onset)    Arthritis Mother    Crohn's disease Sister    Hearing loss Father          Tobacco Use    Smoking status: Former     Current packs/day: 0.25     Average packs/day: 0.3 packs/day for 3.0 years (0.8 ttl pk-yrs)     Types: Cigarettes    Smokeless tobacco: Never   Substance and Sexual Activity    Alcohol use: Yes     Comment: socially    Drug use: Yes     Types: Marijuana     Comment: mojo daily    Sexual activity: Yes     Partners: Female     Birth  control/protection: None     Review of Systems   Constitutional:  Negative for fever.   HENT: Negative.     Eyes: Negative.    Respiratory: Negative.     Cardiovascular: Negative.    Gastrointestinal:  Positive for abdominal pain, nausea and vomiting.   Endocrine: Negative.    Genitourinary: Negative.    Musculoskeletal: Negative.    Skin: Negative.    Allergic/Immunologic: Negative.    Neurological: Negative.    Psychiatric/Behavioral: Negative.       Objective:     Vital Signs (Most Recent):  Temp: 98.7 °F (37.1 °C) (10/09/23 2207)  Pulse: 101 (10/09/23 2302)  Resp: 14 (10/09/23 2302)  BP: (!) 173/97 (10/09/23 2302)  SpO2: 100 % (10/09/23 2302) Vital Signs (24h Range):  Temp:  [98.7 °F (37.1 °C)] 98.7 °F (37.1 °C)  Pulse:  [101-107] 101  Resp:  [14-22] 14  SpO2:  [100 %] 100 %  BP: (167-173)/() 173/97     Weight: 88.5 kg (195 lb)  Body mass index is 25.04 kg/m².     Physical Exam  Vitals and nursing note reviewed.   Constitutional:       General: He is in acute distress.      Appearance: Normal appearance. He is not ill-appearing.   HENT:      Head: Normocephalic and atraumatic.      Nose: Nose normal.      Mouth/Throat:      Mouth: Mucous membranes are moist.   Eyes:      Extraocular Movements: Extraocular movements intact.   Cardiovascular:      Rate and Rhythm: Normal rate.      Pulses: Normal pulses.      Heart sounds: No murmur heard.  Pulmonary:      Effort: Pulmonary effort is normal. No respiratory distress.   Abdominal:      General: Abdomen is flat.      Palpations: Abdomen is soft.      Tenderness: There is abdominal tenderness (mild).   Musculoskeletal:      Right lower leg: No edema.      Left lower leg: No edema.   Skin:     General: Skin is warm.      Capillary Refill: Capillary refill takes less than 2 seconds.   Neurological:      General: No focal deficit present.      Mental Status: He is alert.   Psychiatric:         Mood and Affect: Mood normal.                Significant Labs: All  pertinent labs within the past 24 hours have been reviewed.    Significant Imaging: I have reviewed all pertinent imaging results/findings within the past 24 hours.

## 2023-10-10 NOTE — PLAN OF CARE
Case Management Assessment     PCP: [facility] -    Pharmacy: [facility] [place]    Patient Arrived From: Home  Existing Help at Home: Mother and Girlfriend    Barriers to Discharge: No barriers to discharge.     Discharge Plan:    A. Home    B. Home       10/10/23 0830   Discharge Assessment   Assessment Type Discharge Planning Assessment   Confirmed/corrected address, phone number and insurance Yes   Confirmed Demographics Correct on Facesheet   Source of Information health record   Communicated ELO with patient/caregiver Date not available/Unable to determine   Reason For Admission Acute Kidney Injury Super Imposed on Chronic Kidney Disease   People in Home parent(s)   Do you expect to return to your current living situation? Yes   Do you have help at home or someone to help you manage your care at home? Yes   Who are your caregiver(s) and their phone number(s)? Adria Jett (Mother)   [phone] (Home Phone)   Prior to hospitilization cognitive status: Alert/Oriented   Current cognitive status: Alert/Oriented   Equipment Currently Used at Home none   Readmission within 30 days? Yes  (9/5/2023 [facility] - Acute Kidney Injury Superimposed on Chronic Kidney Disease)   Patient currently being followed by outpatient case management? No   Do you currently have service(s) that help you manage your care at home? No   Do you take prescription medications? Yes   Do you have prescription coverage? Yes   Coverage Medicaid   Do you have any problems affording any of your prescribed medications? No   Is the patient taking medications as prescribed? yes   Who is going to help you get home at discharge? Essence (Girlfriend) [phone]   How do you get to doctors appointments? car, drives self   Are you on dialysis? No   Do you take coumadin? No   DME Needed Upon Discharge  none   Transition of Care Barriers None   Discharge Plan A Home   Discharge Plan B Home   OTHER   Name(s) of People  in Home Adria (Mother)

## 2023-10-10 NOTE — NURSING
[facility]  Nurses Note -- 4 Eyes      10/10/2023       Skin assessed on: Q Shift      [x] No Pressure Injuries Present    []Prevention Measures Documented    [] Yes LDA  for Pressure Injury Previously documented     [] Yes New Pressure Injury Discovered   [] LDA for New Pressure Injury Added      Attending RN:  Karina Ramírez RN     Second RN:  Nina Burden RN

## 2023-10-10 NOTE — ED PROVIDER NOTES
Encounter Date: 10/9/2023    SCRIBE #1 NOTE: I, Vitor Ibanez, am scribing for, and in the presence of,  Monique Handy MD.       History     Chief Complaint   Patient presents with    Abdominal Pain     Patient tx from [facility] for abd pain 2-3 weeks ago. States this happens every time after he smokes mojo. Pt with elevated creatinine 2.9. report mid abdominal pain and R lower and L upper back pain. States last time he smoked was 2-3 wks ago but the pain didn't go away this time like it usually does. Rcv'd 2L NS PT. +N/V, rcv'd phenergan PTA and kept nausea controlled until arrival when he began retching again.     Jayson Berkowitz is a 48 y.o. male with a PMHx of GERD, gastritis, HTN, CKD stage 3, substance abuse, who presents to the ED via EMS from [facility] for evaluation of abdominal pain for 2-3 weeks. Patient reports he was initially seen and discharged over 2 weeks ago for similar complaints after smoking mojo, but report he has not smoked mojo in over a month. Crea was 7.5 during that admission. He endorses complaints of upper abdominal pain, nausea, and vomiting. He also reports hematochezia. He states he was still having complaints during his last discharge, but reports they have exacerbated over the past week. He states he is vomiting over 8 times per day for the past week. He endorses his last BM was at 4 PM. He reports receiving phenergan while at [facility] with no relief to his complaints. Compliant with amlodipine and hydralazine. No other medications taken PTA. No alleviating or exacerbating factors noted. Denies dysuria, hematuria, fever, chills, CP, SOB, or other associated symptoms. Patient endorses he has been smoking marijuana once or twice daily to try to regain his appetite. He denies EtOH usage in over a month. Denies other illicit drug usage. NKDA.     External documents reviewed: per paperwork from [facility], patient had a potassium level of 3.2  and creatinine of 2.9.    The history is provided by the patient. No  was used.     Review of patient's allergies indicates:  No Known Allergies  Past Medical History:   Diagnosis Date    Addiction to drug     Gastritis     GERD (gastroesophageal reflux disease)     Hallucination     Hypertension     Renal disorder     Sleep difficulties     Substance abuse      Past Surgical History:   Procedure Laterality Date    LEG SURGERY Right     NO PAST SURGERIES  11/21/2019     Family History   Problem Relation Age of Onset    Arthritis Mother     Hearing loss Father     Crohn's disease Sister      Social History     Tobacco Use    Smoking status: Former     Current packs/day: 0.25     Average packs/day: 0.3 packs/day for 3.0 years (0.8 ttl pk-yrs)     Types: Cigarettes    Smokeless tobacco: Never   Substance Use Topics    Alcohol use: Yes     Comment: socially    Drug use: Yes     Types: Marijuana     Comment: mojo daily     Review of Systems   Constitutional:  Negative for chills and fever.   HENT:  Negative for drooling and voice change.    Eyes:  Negative for photophobia and visual disturbance.   Respiratory:  Negative for shortness of breath and wheezing.    Cardiovascular:  Negative for chest pain and leg swelling.   Gastrointestinal:  Positive for abdominal pain, blood in stool, nausea and vomiting. Negative for diarrhea.   Genitourinary:  Negative for dysuria, frequency, hematuria and urgency.   Musculoskeletal:  Negative for myalgias and neck pain.   Skin:  Negative for rash and wound.   Neurological:  Negative for syncope, weakness and numbness.   Psychiatric/Behavioral:  Negative for agitation, confusion and suicidal ideas.    All other systems reviewed and are negative.      Physical Exam     Initial Vitals [10/09/23 2207]   BP Pulse Resp Temp SpO2   (!) 170/100 106 18 98.7 °F (37.1 °C) 100 %      MAP       --         Physical Exam    Nursing note and vitals reviewed.  Constitutional: He  appears well-developed and well-nourished. He is not diaphoretic. He appears distressed.   Patient dry heaving into emesis bag. Appears uncomfortable   HENT:   Head: Normocephalic and atraumatic.   Right Ear: External ear normal.   Left Ear: External ear normal.   Mouth/Throat: Oropharynx is clear and moist. No oropharyngeal exudate.   Moist mucus membranes   Eyes: Conjunctivae and EOM are normal. Pupils are equal, round, and reactive to light. Right eye exhibits no discharge. Left eye exhibits no discharge.   Neck: Neck supple. No JVD present.   Normal range of motion.  Cardiovascular:  Regular rhythm, normal heart sounds and intact distal pulses.   Tachycardia present.   Exam reveals no gallop and no friction rub.       No murmur heard.  Pulmonary/Chest: Breath sounds normal. No respiratory distress. He has no wheezes. He has no rhonchi. He has no rales.   Abdominal: Abdomen is soft. Bowel sounds are normal. He exhibits no distension. There is generalized abdominal tenderness.   Negative Brambila's sign, no CVA tenderness   No right CVA tenderness.  No left CVA tenderness. There is no rebound, no guarding and negative Brambila's sign.   Musculoskeletal:         General: No tenderness or edema. Normal range of motion.      Cervical back: Normal range of motion and neck supple.     Lymphadenopathy:     He has no cervical adenopathy.   Neurological: He is alert and oriented to person, place, and time. He has normal strength. No cranial nerve deficit or sensory deficit. GCS score is 15. GCS eye subscore is 4. GCS verbal subscore is 5. GCS motor subscore is 6.   Skin: Skin is warm and dry. Capillary refill takes less than 2 seconds.   Psychiatric: He has a normal mood and affect. Thought content normal.         ED Course   Procedures  Labs Reviewed   CBC W/ AUTO DIFFERENTIAL - Abnormal; Notable for the following components:       Result Value    RDW 15.0 (*)     Gran % 80.4 (*)     Lymph % 13.6 (*)     All other components  within normal limits   COMPREHENSIVE METABOLIC PANEL - Abnormal; Notable for the following components:    Potassium 2.7 (*)     Chloride 111 (*)     CO2 20 (*)     Glucose 141 (*)     BUN 32 (*)     Creatinine 2.4 (*)     eGFR 33 (*)     All other components within normal limits    Narrative:     Potassium critical result(s) called and verbal readback obtained from   Alla CORONA  by LN2 10/09/2023 23:40   URINALYSIS, REFLEX TO URINE CULTURE - Abnormal; Notable for the following components:    Appearance, UA Hazy (*)     Protein, UA 1+ (*)     Glucose, UA Trace (*)     All other components within normal limits    Narrative:     Specimen Source->Urine   DRUG SCREEN PANEL, URINE EMERGENCY - Abnormal; Notable for the following components:    Opiate Scrn, Ur Presumptive Positive (*)     THC Presumptive Positive (*)     Creatinine, Urine 392.3 (*)     All other components within normal limits    Narrative:     Specimen Source->Urine   URINALYSIS MICROSCOPIC - Abnormal; Notable for the following components:    RBC, UA 24 (*)     WBC, UA 9 (*)     Bacteria Many (*)     Hyaline Casts, UA >10 (*)     All other components within normal limits    Narrative:     Specimen Source->Urine   MAGNESIUM   CK   ALCOHOL,MEDICAL (ETHANOL)   TROPONIN I   TROPONIN I   BASIC METABOLIC PANEL   MAGNESIUM   PHOSPHORUS   OCCULT BLOOD X 1, STOOL   SARS-COV-2 RDRP GENE   POCT INFLUENZA A/B MOLECULAR          Imaging Results              CT Abdomen Pelvis  Without Contrast (Final result)  Result time 10/10/23 00:26:44      Final result by Nathan Steven MD (10/10/23 00:26:44)                   Impression:      No acute intra-abdominal or pelvic process.    Scattered colonic diverticula without evidence of acute diverticulitis.    Unchanged sclerosis of the sacroiliac joints.      Electronically signed by: Nathan Steven MD  Date:    10/10/2023  Time:    00:26               Narrative:    EXAMINATION:  CT ABDOMEN PELVIS WITHOUT CONTRAST    CLINICAL  HISTORY:  Nausea/vomiting;    TECHNIQUE:  Axial CT scan of the abdomen and pelvis was obtained from the level of the hemidiaphragms to the pubic symphysis without intravenous contrast. Coronal and sagittal reformats were obtained.    COMPARISON:  09/04/2023 02/14/2021.    FINDINGS:  There are no pleural effusions.  There is no evidence of a pneumothorax.  There are changes of pulmonary emphysema in the lung bases.    The heart is unremarkable.  There is normal tapering of the abdominal aorta.  There are minimal calcifications involving the infrarenal abdominal aorta.    There are unchanged subcentimeter lymph nodes throughout the mesentery.  These are not enlarged by size criteria.    The esophagus, stomach, and duodenum are within normal limits.  The small bowel loops are unremarkable.  The appendix is unremarkable.  There is colonic diverticula without evidence of acute diverticulitis.    There is a stable 2.1 cm hypodensity in the liver with HU of 35.  The liver is otherwise unremarkable.  The gallbladder is unremarkable.  The biliary tree is within normal limits.  The spleen is unremarkable.  The pancreas is within normal limits.  The adrenal glands are unremarkable.    There is no evidence of nephrolithiasis.  There is a stable 1 cm cortical cyst in the left kidney.  The ureters and urinary bladder are unremarkable.  There are calcifications in the prostate gland.    There is no evidence of free fluid in the abdomen pelvis.  There is no evidence of free air.  There is no evidence of pneumatosis.  No portal venous air is identified.    The psoas margins are unremarkable.  There are bilateral fat containing inguinal hernias.  The remainder of the abdominal wall is unremarkable.  There is sclerosis of the bilateral sacroiliac joints.  There are unchanged degenerative findings in the lumbar spine most prominent at the L4-5 level.                                       Medications   potassium chloride SA CR tablet  40 mEq (40 mEq Oral Not Given 10/10/23 0330)   potassium chloride 10 mEq in 100 mL IVPB (10 mEq Intravenous New Bag 10/10/23 0355)   amLODIPine tablet 10 mg (has no administration in time range)   hydrALAZINE tablet 100 mg (100 mg Oral Given 10/10/23 0405)   sodium chloride 0.9% flush 10 mL (has no administration in time range)   melatonin tablet 6 mg (has no administration in time range)   polyethylene glycol packet 17 g (has no administration in time range)   bisacodyL suppository 10 mg (has no administration in time range)   acetaminophen tablet 650 mg (has no administration in time range)   simethicone chewable tablet 80 mg (has no administration in time range)   aluminum-magnesium hydroxide-simethicone 200-200-20 mg/5 mL suspension 30 mL (has no administration in time range)   acetaminophen tablet 650 mg (has no administration in time range)   naloxone 0.4 mg/mL injection 0.02 mg (has no administration in time range)   potassium bicarbonate disintegrating tablet 50 mEq (has no administration in time range)   potassium bicarbonate disintegrating tablet 35 mEq (has no administration in time range)   potassium bicarbonate disintegrating tablet 60 mEq (has no administration in time range)   magnesium oxide tablet 800 mg (has no administration in time range)   magnesium oxide tablet 800 mg (has no administration in time range)   potassium, sodium phosphates 280-160-250 mg packet 2 packet (has no administration in time range)   potassium, sodium phosphates 280-160-250 mg packet 2 packet (has no administration in time range)   potassium, sodium phosphates 280-160-250 mg packet 2 packet (has no administration in time range)   glucose chewable tablet 16 g (has no administration in time range)   glucose chewable tablet 24 g (has no administration in time range)   glucagon (human recombinant) injection 1 mg (has no administration in time range)   dextrose 10% bolus 125 mL 125 mL (has no administration in time range)    dextrose 10% bolus 250 mL 250 mL (has no administration in time range)   magnesium sulfate 2g in water 50mL IVPB (premix) (0 g Intravenous Stopped 10/10/23 0134)   LORazepam injection 1 mg (1 mg Intravenous Given 10/10/23 0010)   potassium chloride 10 mEq in 100 mL IVPB (0 mEq Intravenous Stopped 10/10/23 0355)   cefTRIAXone (ROCEPHIN) 1 g in dextrose 5 % in water (D5W) 100 mL IVPB (MB+) (0 g Intravenous Stopped 10/10/23 0203)   potassium bicarbonate disintegrating tablet 40 mEq (40 mEq Oral Given 10/10/23 0134)   lactated ringers bolus 1,000 mL (0 mLs Intravenous Stopped 10/10/23 0407)   LORazepam injection 0.5 mg (0.5 mg Intravenous Given 10/10/23 0244)     Medical Decision Making  Amount and/or Complexity of Data Reviewed  Labs: ordered.  Radiology: ordered.  ECG/medicine tests:  Decision-making details documented in ED Course.    Risk  Prescription drug management.  Decision regarding hospitalization.    MDM  48  year old patient presenting from [facility]  2/2 abdominal pain and vomiting after daily MJ usage. Recently admitted to N/V after mojo usage with severe dehydration and metabolic derangement at that time.     Also considered but less likely:     AAA: location inconsistent, no bruits,   Cholecystitis: location inconsistent, no relation with meals, negative morgans  SBO: normal BM and flatus  Appendicitis: location inconsistent, no fever, no rebound/guarding  Mesenteric ischemia: HPI inconsistent, does not coincide with meals, other dx more likely  Kidney stone: no radiation to back or cva tenderness, no dysuria, no hematuria  Pyelonephritis: no cva tenderness, no dysuria, no fever  Pancreatitis: no history of alcohol abuse, unlikely gallstone obstructing,   Diverticulitis: age and location not most common, no history of diverticulitis, no fever, no wbc    Labs with negative covid and flu  UA with RBC, 9 WBC, likely 2/2 dehydration and ARF  Utox with THC and opiates   No leukocytosis or anemia  Potassium low  at 2.7--patient given 40 meq PO and 10 meq IV as his QTC was prolonged to 580s on EKG. Mag 2 grams given. Repeat QTC normalized.  Noted to have ADINA to 2.4 (baseline 1.8). CO2 of 20 in the setting of vomiting.   ETOH normal.   Troponin WNL. EKG with depression and TWI change from previous as well as prolonged QTC and Uwaves. Improved on recheck, likely 2/2 severe hypokalemia.     CT abd/pelvis with no acute findings.     Case discussed with Dr. Long who agrees to admit the patient for further management and care. Given initial prolonged QTc ativan was given with improvement of nausea.           Scribe Attestation:   Scribe #1: I performed the above scribed service and the documentation accurately describes the services I performed. I attest to the accuracy of the note.        ED Course as of 10/10/23 0507   Tue Oct 10, 2023   0229 EKG 12-lead  Sinus rhythm with marked sinus arrhythmia at 98.  No ST elevation however patient has T-wave inversions inferiorly and depressions inferior laterally.  Prolonged QTC significantly at 49.  U-waves.  Right axis deviation.  This is an abnormal EKG [JT]   0230 Repeat EKG 12-lead  Repeat EKGs sinus tachycardia at 109 beats per minute.  No ST elevation or significant depression now.  T-wave inversions inferior laterally.  QTC has improved to 42.  Rightward axis deviation.  This EKG is still abnormal but improved from his previous. [JT]      ED Course User Index  [JT] Monique Handy MD                    I, Monique Handy, personally performed the services described in this documentation. All medical record entries made by the scribe were at my direction and in my presence. I have reviewed the chart and agree that the record reflects my personal performance and is accurate and complete.    Clinical Impression:   Final diagnoses:  [R11.10] Vomiting  [E87.6] Hypokalemia  [N17.9] ADINA (acute kidney injury) (Primary)  [R94.31] Abnormal EKG  [E86.0] Dehydration        ED Disposition  Condition    Admit Stable                Monique Handy MD  10/10/23 0536

## 2023-10-11 VITALS
RESPIRATION RATE: 19 BRPM | TEMPERATURE: 98 F | BODY MASS INDEX: 26.03 KG/M2 | OXYGEN SATURATION: 99 % | SYSTOLIC BLOOD PRESSURE: 130 MMHG | DIASTOLIC BLOOD PRESSURE: 82 MMHG | HEART RATE: 77 BPM | WEIGHT: 202.81 LBS | HEIGHT: 74 IN

## 2023-10-11 PROBLEM — F12.188 CANNABIS HYPEREMESIS SYNDROME CONCURRENT WITH AND DUE TO CANNABIS ABUSE: Status: ACTIVE | Noted: 2023-10-11

## 2023-10-11 LAB
ANION GAP SERPL CALC-SCNC: 7 MMOL/L (ref 8–16)
BUN SERPL-MCNC: 20 MG/DL (ref 6–20)
CALCIUM SERPL-MCNC: 8.9 MG/DL (ref 8.7–10.5)
CHLORIDE SERPL-SCNC: 112 MMOL/L (ref 95–110)
CO2 SERPL-SCNC: 24 MMOL/L (ref 23–29)
CREAT SERPL-MCNC: 1.3 MG/DL (ref 0.5–1.4)
EST. GFR  (NO RACE VARIABLE): >60 ML/MIN/1.73 M^2
GLUCOSE SERPL-MCNC: 92 MG/DL (ref 70–110)
POTASSIUM SERPL-SCNC: 3.1 MMOL/L (ref 3.5–5.1)
SODIUM SERPL-SCNC: 143 MMOL/L (ref 136–145)

## 2023-10-11 PROCEDURE — 63600175 PHARM REV CODE 636 W HCPCS: Performed by: HOSPITALIST

## 2023-10-11 PROCEDURE — 25000003 PHARM REV CODE 250: Performed by: STUDENT IN AN ORGANIZED HEALTH CARE EDUCATION/TRAINING PROGRAM

## 2023-10-11 PROCEDURE — 80048 BASIC METABOLIC PNL TOTAL CA: CPT | Performed by: HOSPITALIST

## 2023-10-11 PROCEDURE — 25000003 PHARM REV CODE 250: Performed by: INTERNAL MEDICINE

## 2023-10-11 PROCEDURE — 36415 COLL VENOUS BLD VENIPUNCTURE: CPT | Performed by: HOSPITALIST

## 2023-10-11 RX ORDER — POTASSIUM CHLORIDE 750 MG/1
30 TABLET, EXTENDED RELEASE ORAL 3 TIMES DAILY
Status: DISCONTINUED | OUTPATIENT
Start: 2023-10-11 | End: 2023-10-11

## 2023-10-11 RX ORDER — POTASSIUM CHLORIDE 20 MEQ/1
40 TABLET, EXTENDED RELEASE ORAL ONCE
Status: COMPLETED | OUTPATIENT
Start: 2023-10-11 | End: 2023-10-11

## 2023-10-11 RX ADMIN — POTASSIUM CHLORIDE 30 MEQ: 750 TABLET, EXTENDED RELEASE ORAL at 09:10

## 2023-10-11 RX ADMIN — HYDRALAZINE HYDROCHLORIDE 100 MG: 25 TABLET, FILM COATED ORAL at 05:10

## 2023-10-11 RX ADMIN — AMLODIPINE BESYLATE 10 MG: 5 TABLET ORAL at 09:10

## 2023-10-11 RX ADMIN — METOCLOPRAMIDE 5 MG: 5 INJECTION, SOLUTION INTRAMUSCULAR; INTRAVENOUS at 10:10

## 2023-10-11 RX ADMIN — POTASSIUM CHLORIDE 40 MEQ: 1500 TABLET, EXTENDED RELEASE ORAL at 11:10

## 2023-10-11 RX ADMIN — METOCLOPRAMIDE 5 MG: 5 INJECTION, SOLUTION INTRAMUSCULAR; INTRAVENOUS at 06:10

## 2023-10-11 NOTE — ASSESSMENT & PLAN NOTE
Patient with acute kidney injury/acute renal failure likely due to pre-renal azotemia due to IVVD ADINA is currently stable. Baseline creatinine 1.5 - Labs reviewed- Renal function/electrolytes with Estimated Creatinine Clearance: 82.6 mL/min (based on SCr of 1.3 mg/dL). according to latest data. Monitor urine output and serial BMP and adjust therapy as needed. Avoid nephrotoxins and renally dose meds for GFR listed above.

## 2023-10-11 NOTE — ASSESSMENT & PLAN NOTE
CT A/P negative for acute process  Likely in the setting of marijuana use/hyperemesis  IVFs  Replace electrolytes   Symptomatic control   Resolved

## 2023-10-11 NOTE — ASSESSMENT & PLAN NOTE
Patient advised to forego further cannabis use and mental health / substance use treatment referral was offered

## 2023-10-11 NOTE — PLAN OF CARE
Problem: Adult Inpatient Plan of Care  Goal: Plan of Care Review  Outcome: Ongoing, Progressing  Goal: Patient-Specific Goal (Individualized)  Outcome: Ongoing, Progressing  Goal: Absence of Hospital-Acquired Illness or Injury  Outcome: Ongoing, Progressing  Goal: Optimal Comfort and Wellbeing  Outcome: Ongoing, Progressing  Goal: Readiness for Transition of Care  Outcome: Ongoing, Progressing     Problem: Fluid and Electrolyte Imbalance (Acute Kidney Injury/Impairment)  Goal: Fluid and Electrolyte Balance  Outcome: Ongoing, Progressing     Problem: Oral Intake Inadequate (Acute Kidney Injury/Impairment)  Goal: Optimal Nutrition Intake  Outcome: Ongoing, Progressing     Problem: Renal Function Impairment (Acute Kidney Injury/Impairment)  Goal: Effective Renal Function  Outcome: Ongoing, Progressing     Problem: Adult Inpatient Plan of Care  Goal: Optimal Comfort and Wellbeing  Outcome: Ongoing, Progressing     Problem: Adult Inpatient Plan of Care  Goal: Readiness for Transition of Care  Outcome: Ongoing, Progressing     Problem: Fluid and Electrolyte Imbalance (Acute Kidney Injury/Impairment)  Goal: Fluid and Electrolyte Balance  Outcome: Ongoing, Progressing     Problem: Oral Intake Inadequate (Acute Kidney Injury/Impairment)  Goal: Optimal Nutrition Intake  Outcome: Ongoing, Progressing     Problem: Renal Function Impairment (Acute Kidney Injury/Impairment)  Goal: Effective Renal Function  Outcome: Ongoing, Progressing

## 2023-10-11 NOTE — PLAN OF CARE
CM notified nurseKarina that pt is ready for discharge from CM standpoint. All CM needs met.     10/11/23 1154   Final Note   Assessment Type Final Discharge Note   Anticipated Discharge Disposition Home   What phone number can be called within the next 1-3 days to see how you are doing after discharge? [phone]   Hospital Resources/Appts/Education Provided Appointments scheduled and added to AVS   Post-Acute Status   Coverage [facility]-MEDICAID   Discharge Delays None known at this time

## 2023-10-11 NOTE — NURSING
Discharge instructions given to patient at bedside. Patient verbalized understanding of instructions. Patient states willingness to comply. Saline lock removed. Tele monitoring removed.  Virtual nurse completed discharge teaching.

## 2023-10-11 NOTE — NURSING
[facility] [facility] [place] 4 [place]      10/11/2023       Skin assessed on: Q Shift      [x] No Pressure Injuries Present    []Prevention Measures Documented    [] Yes LDA  for Pressure Injury Previously documented     [] Yes New Pressure Injury Discovered   [] LDA for New Pressure Injury Added      Attending RN:  Karina Ramírez RN     Second RN:  Nina Burden RN

## 2023-10-11 NOTE — DISCHARGE SUMMARY
[facility] Medicine  Telemedicine Discharge Summary      Patient Name: Jayson Berkowitz  MRN: [id number]  Patient Class: IP- Inpatient  Admission Date: 10/9/2023  Hospital Length of Stay: 1 days  Discharge Date and Time:  10/11/2023 11:32 AM  Attending Physician: Annabella Nix MD   Discharging Provider: Annabella Nix MD  Primary Care Provider: [facility] -    HPI:   This is a 48-year-old male with a past medical history of hypertension, CKD 3, tobacco use, opioid abuse, marijuana use, who presents with abdominal pain.    Patient presented from [facility] for evaluation no abdominal pain.  He had a recent hospitalization (9/4-9/6) after presenting with epigastric abdominal pain and vomiting, in the setting of marijuana use.  He was found to have hypokalemia and ADINA on CKD which improved with fluids.  Patient returned to the ED for similar symptoms.  He reports that since discharge, he has been having intermittent nausea and vomiting that worsened a day prior to presentation.  He reports that over the last 2 days, he had more than 20 episodes of emesis.  Associated symptoms include abdominal pain and decreased p.o. intake.  He denied chest pain.  He smokes marijuana daily.    In the ED, the patient was hypertensive.  Labs were remarkable for an elevated creatinine (2.4-previously 1.8), hypokalemia (2.7).  UDS was positive for opioids and marijuana.  UA showed 9 WBCs, many bacteria.  EKG demonstrated prolonged QTC.  CT abdomen and pelvis without contrast showed no acute intra-abdominal/pelvic process.  Patient was given potassium bicarbonate 40 mEq, magnesium sulfate 2 g IV, Ativan 1 mg IV, and Rocephin 1 g IV.  He was admitted for further management.      * No surgery found *      Hospital Course:   Patient treated with supportive care for ADINA due to cannabis hyperemesis.    See Problems listed below for additional details of this hospital stay.      Goals of Care Treatment  Preferences:  Code Status: Full Code    Consults:   Consults (From admission, onward)          Status Ordering Provider     Inpatient virtual consult to Hospital Medicine  Once        Provider:  (Not yet assigned)    Completed YOLETTE DANG            Psychiatric  Cannabis hyperemesis syndrome concurrent with and due to cannabis abuse  Patient advised to forego further cannabis use and mental health / substance use treatment referral was offered    Cardiac/Vascular  Prolonged Q-T interval on ECG  Initial QTC of 589, improved to 482 after Mag.   Avoid QT prolonging agents     Essential hypertension  Resume home medications     Renal/  * Acute kidney injury superimposed on chronic kidney disease  Patient with acute kidney injury/acute renal failure likely due to pre-renal azotemia due to IVVD ADINA is currently stable. Baseline creatinine 1.5 - Labs reviewed- Renal function/electrolytes with Estimated Creatinine Clearance: 82.6 mL/min (based on SCr of 1.3 mg/dL). according to latest data. Monitor urine output and serial BMP and adjust therapy as needed. Avoid nephrotoxins and renally dose meds for GFR listed above.    Hypokalemia  Likely due to vomiting  Replace PRN    GI  Intractable nausea and vomiting  CT A/P negative for acute process  Likely in the setting of marijuana use/hyperemesis  IVFs  Replace electrolytes   Symptomatic control   Resolved    Palliative Care  Marijuana use  Chronic      Final Active Diagnoses:    Diagnosis Date Noted POA    PRINCIPAL PROBLEM:  Acute kidney injury superimposed on chronic kidney disease [N17.9, N18.9] 09/04/2023 Yes    Cannabis hyperemesis syndrome concurrent with and due to cannabis abuse [F12.188] 10/11/2023 Yes    Prolonged Q-T interval on ECG [R94.31] 10/10/2023 Yes    Hypokalemia [E87.6] 09/04/2023 Yes    Stage 3 chronic kidney disease [N18.30] 09/04/2023 Yes    Essential hypertension [I10] 10/13/2019 Yes     Chronic    Marijuana use [F12.90] 08/23/2019 Yes     Intractable nausea and vomiting [R11.2] 01/31/2016 Yes      Problems Resolved During this Admission:       Discharged Condition: stable    Disposition: Home or Self Care    Follow Up:   Follow-up Information       [facility] -. Schedule an appointment as soon as possible for a visit in 1 week(s).    Why: For discharge from hospital follow up  Contact information:  [address] [place] [address]  [phone]                           Patient Instructions:      Ambulatory referral/consult to Outpatient Case Management   Referral Priority: Routine Referral Type: Consultation   Referral Reason: Specialty Services Required   Number of Visits Requested: 1     Diet Adult Regular     Notify your health care provider if you experience any of the following:  temperature >100.4     Notify your health care provider if you experience any of the following:  persistent nausea and vomiting or diarrhea     Notify your health care provider if you experience any of the following:  difficulty breathing or increased cough     Notify your health care provider if you experience any of the following:  severe persistent headache     Notify your health care provider if you experience any of the following:  persistent dizziness, light-headedness, or visual disturbances     Notify your health care provider if you experience any of the following:  increased confusion or weakness     Activity as tolerated       Significant Diagnostic Studies:  Recent Labs   Lab 10/09/23  2313   WBC 8.85   HGB 14.0   HCT 41.9        Recent Labs   Lab 10/09/23  2313   GRAN 80.4*  7.1   LYMPH 13.6*  1.2   MONO 5.5  0.5   EOS 0.0     Recent Labs   Lab 10/09/23  2313 10/10/23  0531 10/11/23  0336    142 143   K 2.7* 2.9* 3.1*   * 111* 112*   CO2 20* 22* 24   BUN 32* 25* 20   CREATININE 2.4* 1.6* 1.3   * 124* 92   CALCIUM 9.1 9.1 8.9   ALBUMIN 4.1  --   --    MG 2.1 2.4  --    PHOS  --  2.9  --      Recent Labs   Lab  10/09/23  2313   ALKPHOS 111   ALT 17   AST 15   PROT 8.0   BILITOT 0.4     Recent Labs   Lab 10/09/23  2313      TROPONINI 0.026     POC Rapid COVID (no units)   Date Value   10/09/2023 Negative   03/05/2023 Negative   12/04/2020 Negative   10/29/2020 Negative   10/27/2020 Negative   09/16/2020 Negative     Results for orders placed during the hospital encounter of 10/29/20    Echo Color Flow Doppler? Yes    Interpretation Summary  · With normal systolic function. The estimated ejection fraction is 60%.  · Normal left ventricular diastolic function.  · Normal right ventricular systolic function.  · Normal central venous pressure (3 mmHg).  · The estimated PA systolic pressure is 18 mmHg.      CT Abdomen Pelvis  Without Contrast  Narrative: EXAMINATION:  CT ABDOMEN PELVIS WITHOUT CONTRAST    CLINICAL HISTORY:  Nausea/vomiting;    TECHNIQUE:  Axial CT scan of the abdomen and pelvis was obtained from the level of the hemidiaphragms to the pubic symphysis without intravenous contrast. Coronal and sagittal reformats were obtained.    COMPARISON:  09/04/2023 02/14/2021.    FINDINGS:  There are no pleural effusions.  There is no evidence of a pneumothorax.  There are changes of pulmonary emphysema in the lung bases.    The heart is unremarkable.  There is normal tapering of the abdominal aorta.  There are minimal calcifications involving the infrarenal abdominal aorta.    There are unchanged subcentimeter lymph nodes throughout the mesentery.  These are not enlarged by size criteria.    The esophagus, stomach, and duodenum are within normal limits.  The small bowel loops are unremarkable.  The appendix is unremarkable.  There is colonic diverticula without evidence of acute diverticulitis.    There is a stable 2.1 cm hypodensity in the liver with HU of 35.  The liver is otherwise unremarkable.  The gallbladder is unremarkable.  The biliary tree is within normal limits.  The spleen is unremarkable.  The pancreas  is within normal limits.  The adrenal glands are unremarkable.    There is no evidence of nephrolithiasis.  There is a stable 1 cm cortical cyst in the left kidney.  The ureters and urinary bladder are unremarkable.  There are calcifications in the prostate gland.    There is no evidence of free fluid in the abdomen pelvis.  There is no evidence of free air.  There is no evidence of pneumatosis.  No portal venous air is identified.    The psoas margins are unremarkable.  There are bilateral fat containing inguinal hernias.  The remainder of the abdominal wall is unremarkable.  There is sclerosis of the bilateral sacroiliac joints.  There are unchanged degenerative findings in the lumbar spine most prominent at the L4-5 level.  Impression: No acute intra-abdominal or pelvic process.    Scattered colonic diverticula without evidence of acute diverticulitis.    Unchanged sclerosis of the sacroiliac joints.    Electronically signed by: Nathan Steven MD  Date:    10/10/2023  Time:    00:26     Medications:  Reconciled Home Medications:      Medication List        CONTINUE taking these medications      amLODIPine 10 MG tablet  Commonly known as: NORVASC  Take 1 tablet (10 mg total) by mouth once daily.     esomeprazole 20 MG capsule  Commonly known as: NEXIUM  TAKE 1 CAPSULE BY MOUTH EVERY DAY BEFORE BREAKFAST     hydrALAZINE 100 MG tablet  Commonly known as: APRESOLINE  Take 1 tablet (100 mg total) by mouth every 8 (eight) hours.     ketoconazole 2 % cream  Commonly known as: NIZORAL  Apply topically 2 (two) times daily. Apply to affected areas of skin for tinea cruris            STOP taking these medications      albuterol 90 mcg/actuation inhaler  Commonly known as: PROVENTIL/VENTOLIN HFA     ondansetron 4 MG tablet  Commonly known as: ZOFRAN     promethazine 25 MG suppository  Commonly known as: PHENERGAN              Indwelling Lines/Drains at time of discharge:   Lines/Drains/Airways       None     Time spent on the  discharge of patient: 45 minutes  This service was provided by Virtual Visit without a Telepresenter.   Patient was seen and examined on the date of discharge.  Additional time was spent speaking with consultants and case management, reviewing records, and/or discussing the plan of care with patient/family.    The patient location: W316/W316 A  Admitted 10/9/2023 10:23 PM  Patient was transferred to [facility] on:  10/11/23   This document was partially prepared by chart review and may not directly reflect my personal knowledge of the patient's case, clinical course, or significant events during the hospital stay.    The attending portion of this evaluation, treatment, and documentation was performed per Annabella Nix MD via Telemedicine AudioVisual using the secure Zong software platform with 2 way audio/video. The provider was located off-site and the patient is located in the hospital. The aforementioned video software was utilized to document the relevant history and physical exam    Annabella Nix MD  Department of [facility] Medicine  [facility]

## 2023-10-12 ENCOUNTER — PATIENT OUTREACH (OUTPATIENT)
Dept: ADMINISTRATIVE | Facility: CLINIC | Age: 48
End: 2023-10-12
Payer: MEDICAID

## 2023-12-11 PROCEDURE — 99284 EMERGENCY DEPT VISIT MOD MDM: CPT | Mod: 25

## 2023-12-11 PROCEDURE — 96374 THER/PROPH/DIAG INJ IV PUSH: CPT

## 2023-12-11 PROCEDURE — 96375 TX/PRO/DX INJ NEW DRUG ADDON: CPT

## 2023-12-12 ENCOUNTER — HOSPITAL ENCOUNTER (EMERGENCY)
Facility: HOSPITAL | Age: 48
Discharge: HOME | End: 2023-12-12
Attending: STUDENT IN AN ORGANIZED HEALTH CARE EDUCATION/TRAINING PROGRAM
Payer: MEDICAID

## 2023-12-12 VITALS
BODY MASS INDEX: 25.04 KG/M2 | RESPIRATION RATE: 18 BRPM | TEMPERATURE: 99 F | WEIGHT: 195 LBS | OXYGEN SATURATION: 97 % | SYSTOLIC BLOOD PRESSURE: 115 MMHG | DIASTOLIC BLOOD PRESSURE: 77 MMHG | HEART RATE: 77 BPM

## 2023-12-12 DIAGNOSIS — F12.90 CANNABINOID HYPEREMESIS SYNDROME: Primary | ICD-10-CM

## 2023-12-12 DIAGNOSIS — E87.6 HYPOKALEMIA: ICD-10-CM

## 2023-12-12 DIAGNOSIS — R11.2 CANNABINOID HYPEREMESIS SYNDROME: Primary | ICD-10-CM

## 2023-12-12 DIAGNOSIS — E86.0 DEHYDRATION: ICD-10-CM

## 2023-12-12 LAB
ALBUMIN SERPL BCP-MCNC: 4.7 G/DL (ref 3.5–5.2)
ALP SERPL-CCNC: 121 U/L (ref 55–135)
ALT SERPL W/O P-5'-P-CCNC: 14 U/L (ref 10–44)
ANION GAP SERPL CALC-SCNC: 16 MMOL/L (ref 8–16)
AST SERPL-CCNC: 15 U/L (ref 10–40)
BASOPHILS # BLD AUTO: 0.02 K/UL (ref 0–0.2)
BASOPHILS NFR BLD: 0.3 % (ref 0–1.9)
BILIRUB SERPL-MCNC: 0.4 MG/DL (ref 0.1–1)
BUN SERPL-MCNC: 19 MG/DL (ref 6–20)
CALCIUM SERPL-MCNC: 10.7 MG/DL (ref 8.7–10.5)
CHLORIDE SERPL-SCNC: 109 MMOL/L (ref 95–110)
CO2 SERPL-SCNC: 23 MMOL/L (ref 23–29)
CREAT SERPL-MCNC: 1.7 MG/DL (ref 0.5–1.4)
DIFFERENTIAL METHOD: ABNORMAL
EOSINOPHIL # BLD AUTO: 0 K/UL (ref 0–0.5)
EOSINOPHIL NFR BLD: 0.2 % (ref 0–8)
ERYTHROCYTE [DISTWIDTH] IN BLOOD BY AUTOMATED COUNT: 13.6 % (ref 11.5–14.5)
EST. GFR  (NO RACE VARIABLE): 50 ML/MIN/1.73 M^2
GLUCOSE SERPL-MCNC: 147 MG/DL (ref 70–110)
HCT VFR BLD AUTO: 40.7 % (ref 40–54)
HGB BLD-MCNC: 13.7 G/DL (ref 14–18)
IMM GRANULOCYTES # BLD AUTO: 0.02 K/UL (ref 0–0.04)
IMM GRANULOCYTES NFR BLD AUTO: 0.3 % (ref 0–0.5)
LIPASE SERPL-CCNC: 29 U/L (ref 4–60)
LYMPHOCYTES # BLD AUTO: 1 K/UL (ref 1–4.8)
LYMPHOCYTES NFR BLD: 16.7 % (ref 18–48)
MAGNESIUM SERPL-MCNC: 2.3 MG/DL (ref 1.6–2.6)
MCH RBC QN AUTO: 29.8 PG (ref 27–31)
MCHC RBC AUTO-ENTMCNC: 33.7 G/DL (ref 32–36)
MCV RBC AUTO: 89 FL (ref 82–98)
MONOCYTES # BLD AUTO: 0.4 K/UL (ref 0.3–1)
MONOCYTES NFR BLD: 6.3 % (ref 4–15)
NEUTROPHILS # BLD AUTO: 4.6 K/UL (ref 1.8–7.7)
NEUTROPHILS NFR BLD: 76.2 % (ref 38–73)
NRBC BLD-RTO: 0 /100 WBC
PLATELET # BLD AUTO: 290 K/UL (ref 150–450)
PMV BLD AUTO: 9.3 FL (ref 9.2–12.9)
POTASSIUM SERPL-SCNC: 3 MMOL/L (ref 3.5–5.1)
PROT SERPL-MCNC: 9.7 G/DL (ref 6–8.4)
RBC # BLD AUTO: 4.6 M/UL (ref 4.6–6.2)
SODIUM SERPL-SCNC: 148 MMOL/L (ref 136–145)
WBC # BLD AUTO: 5.99 K/UL (ref 3.9–12.7)

## 2023-12-12 PROCEDURE — 63600175 PHARM REV CODE 636 W HCPCS: Performed by: PHYSICIAN ASSISTANT

## 2023-12-12 PROCEDURE — 25000003 PHARM REV CODE 250: Performed by: PHYSICIAN ASSISTANT

## 2023-12-12 PROCEDURE — 96372 THER/PROPH/DIAG INJ SC/IM: CPT | Performed by: PHYSICIAN ASSISTANT

## 2023-12-12 PROCEDURE — 85025 COMPLETE CBC W/AUTO DIFF WBC: CPT | Performed by: PHYSICIAN ASSISTANT

## 2023-12-12 PROCEDURE — 83690 ASSAY OF LIPASE: CPT | Performed by: PHYSICIAN ASSISTANT

## 2023-12-12 PROCEDURE — 80053 COMPREHEN METABOLIC PANEL: CPT | Performed by: PHYSICIAN ASSISTANT

## 2023-12-12 PROCEDURE — 83735 ASSAY OF MAGNESIUM: CPT | Performed by: PHYSICIAN ASSISTANT

## 2023-12-12 RX ORDER — PROMETHAZINE HYDROCHLORIDE 25 MG/1
25 TABLET ORAL EVERY 6 HOURS PRN
Qty: 15 TABLET | Refills: 0 | Status: SHIPPED | OUTPATIENT
Start: 2023-12-12

## 2023-12-12 RX ORDER — DIPHENHYDRAMINE HYDROCHLORIDE 50 MG/ML
50 INJECTION INTRAMUSCULAR; INTRAVENOUS
Status: COMPLETED | OUTPATIENT
Start: 2023-12-12 | End: 2023-12-12

## 2023-12-12 RX ORDER — HALOPERIDOL 5 MG/ML
5 INJECTION INTRAMUSCULAR
Status: COMPLETED | OUTPATIENT
Start: 2023-12-12 | End: 2023-12-12

## 2023-12-12 RX ORDER — METOCLOPRAMIDE HYDROCHLORIDE 5 MG/ML
10 INJECTION INTRAMUSCULAR; INTRAVENOUS
Status: COMPLETED | OUTPATIENT
Start: 2023-12-12 | End: 2023-12-12

## 2023-12-12 RX ORDER — ONDANSETRON 4 MG/1
8 TABLET, FILM COATED ORAL EVERY 6 HOURS PRN
Qty: 30 TABLET | Refills: 0 | Status: SHIPPED | OUTPATIENT
Start: 2023-12-12 | End: 2024-01-11

## 2023-12-12 RX ORDER — ONDANSETRON 2 MG/ML
8 INJECTION INTRAMUSCULAR; INTRAVENOUS
Status: COMPLETED | OUTPATIENT
Start: 2023-12-12 | End: 2023-12-12

## 2023-12-12 RX ADMIN — SODIUM CHLORIDE 1000 ML: 9 INJECTION, SOLUTION INTRAVENOUS at 01:12

## 2023-12-12 RX ADMIN — HALOPERIDOL LACTATE 5 MG: 5 INJECTION, SOLUTION INTRAMUSCULAR at 02:12

## 2023-12-12 RX ADMIN — ONDANSETRON 8 MG: 2 INJECTION INTRAMUSCULAR; INTRAVENOUS at 01:12

## 2023-12-12 RX ADMIN — METOCLOPRAMIDE 10 MG: 5 INJECTION, SOLUTION INTRAMUSCULAR; INTRAVENOUS at 02:12

## 2023-12-12 RX ADMIN — DIPHENHYDRAMINE HYDROCHLORIDE 50 MG: 50 INJECTION, SOLUTION INTRAMUSCULAR; INTRAVENOUS at 02:12

## 2023-12-12 NOTE — ED TRIAGE NOTES
49 yo male presents to the ED, escorted by his son, with c/o N/V with abdominal pain and chills. Pt is AAO x 4 upon assessment; reports that symptoms began this morning; denies CP, SOB diarrhea, fever. Pt endorses PMH of HTN; rates pain at an 8 on a PRS of 0-10. Pt is actively vomiting. Plan of care is ongoing.

## 2023-12-12 NOTE — ED PROVIDER NOTES
Encounter Date: 12/11/2023       History     Chief Complaint   Patient presents with    Vomiting     All day, was seen last month for same problem. Has not taken bp meds today.     48-year-old male with history of substance abuse, including THC products presents to the emergency department for acute onset nausea, vomiting, generalized abdominal pain that began today.  Atraumatic.  Associated diarrhea.  States more than 10 episodes of nonbloody or bilious emesis.  Patient states he is still regularly smoking marijuana but does not believe it is related to CHS today.  States it is reminiscent of his past episodes of pancreatitis related to alcohol.  No history of biliary disease.  Denies chest pain, shortness of breath, fever, and URI symptoms.  No medication prior to arrival.        Review of patient's allergies indicates:  No Known Allergies  Past Medical History:   Diagnosis Date    Addiction to drug     Gastritis     GERD (gastroesophageal reflux disease)     Hallucination     Hypertension     Renal disorder     Sleep difficulties     Substance abuse      Past Surgical History:   Procedure Laterality Date    LEG SURGERY Right     NO PAST SURGERIES  11/21/2019     Family History   Problem Relation Age of Onset    Arthritis Mother     Hearing loss Father     Crohn's disease Sister      Social History     Tobacco Use    Smoking status: Former     Current packs/day: 0.25     Average packs/day: 0.3 packs/day for 3.0 years (0.8 ttl pk-yrs)     Types: Cigarettes    Smokeless tobacco: Never   Substance Use Topics    Alcohol use: Yes     Comment: socially    Drug use: Yes     Types: Marijuana     Comment: mojo daily     Review of Systems   Constitutional:  Negative for fever.   HENT:  Negative for congestion, sore throat and trouble swallowing.    Respiratory:  Negative for cough and shortness of breath.    Cardiovascular:  Negative for chest pain.   Gastrointestinal:  Positive for abdominal pain, diarrhea, nausea and  vomiting. Negative for constipation.   Genitourinary:  Negative for dysuria, flank pain, frequency and urgency.   Musculoskeletal:  Negative for back pain.   Skin:  Negative for rash.   Neurological:  Negative for headaches.   All other systems reviewed and are negative.      Physical Exam     Initial Vitals   BP Pulse Resp Temp SpO2   12/11/23 2351 12/11/23 2349 12/11/23 2349 12/11/23 2351 12/11/23 2349   (!) 160/99 69 18 98.8 °F (37.1 °C) 98 %      MAP       --                Physical Exam    Nursing note and vitals reviewed.  Constitutional: He appears well-developed and well-nourished. He is not diaphoretic. No distress.   HENT:   Head: Normocephalic and atraumatic.   Nose: Nose normal.   Mouth/Throat: Oropharynx is clear and moist.   Eyes: Conjunctivae and EOM are normal. Right eye exhibits no discharge. Left eye exhibits no discharge.   Neck: No tracheal deviation present. No JVD present.   Normal range of motion.  Cardiovascular:  Normal rate, regular rhythm and normal heart sounds.     Exam reveals no friction rub.       No murmur heard.  Pulmonary/Chest: Breath sounds normal. No stridor. No respiratory distress. He has no wheezes.   Abdominal: Abdomen is soft. He exhibits no distension. There is no abdominal tenderness.   No right CVA tenderness.  No left CVA tenderness. There is no rebound, no guarding, no tenderness at McBurney's point and negative Brambila's sign. negative Rovsing's sign  Musculoskeletal:         General: Normal range of motion.      Cervical back: Normal range of motion.     Neurological: He is alert and oriented to person, place, and time.   Skin: Skin is warm and dry. No rash noted. No pallor.         ED Course   Procedures  Labs Reviewed   CBC W/ AUTO DIFFERENTIAL - Abnormal; Notable for the following components:       Result Value    Hemoglobin 13.7 (*)     Gran % 76.2 (*)     Lymph % 16.7 (*)     All other components within normal limits   COMPREHENSIVE METABOLIC PANEL - Abnormal;  Notable for the following components:    Sodium 148 (*)     Potassium 3.0 (*)     Glucose 147 (*)     Creatinine 1.7 (*)     Calcium 10.7 (*)     Total Protein 9.7 (*)     eGFR 50 (*)     All other components within normal limits   LIPASE   MAGNESIUM   URINALYSIS, REFLEX TO URINE CULTURE          Imaging Results    None          Medications   sodium chloride 0.9% bolus 1,000 mL 1,000 mL (1,000 mLs Intravenous New Bag 12/12/23 0130)   ondansetron injection 8 mg (8 mg Intravenous Given by Other 12/12/23 0126)   metoclopramide HCl injection 10 mg (10 mg Intravenous Given 12/12/23 0203)   diphenhydrAMINE injection 50 mg (50 mg Intravenous Given 12/12/23 0200)   haloperidol lactate injection 5 mg (5 mg Intramuscular Given 12/12/23 0201)     Medical Decision Making  Symptoms resolved in the ED and he is sleeping peacefully; easily aroused.  States he feels comfortable going home.  Tolerating p.o..  Benign abdominal exam and overall not consistent with surgical process.  Hemodynamically stable.  History of CHS and states he continues to use THC; consistent with his clinical picture today.  Very mild renal insufficiency but no significant change from his baseline presumably due to dehydration.  Mild hypokalemia that he can replete at home.  No acute pancreatitis or DKA.  No signs of infectious process or biliary disease.  Low risk for occult cardiopulmonary process today.    Amount and/or Complexity of Data Reviewed  Labs: ordered.    Risk  Prescription drug management.                                      Clinical Impression:  Final diagnoses:  [R11.2, F12.90] Cannabinoid hyperemesis syndrome (Primary)  [E86.0] Dehydration  [E87.6] Hypokalemia          ED Disposition Condition    Discharge Stable          ED Prescriptions       Medication Sig Dispense Start Date End Date Auth. Provider    ondansetron (ZOFRAN) 4 MG tablet Take 2 tablets (8 mg total) by mouth every 6 (six) hours as needed for Nausea. 30 tablet 12/12/2023  1/11/2024 Joni Hamilton PA-C    promethazine (PHENERGAN) 25 MG tablet Take 1 tablet (25 mg total) by mouth every 6 (six) hours as needed for Nausea. 15 tablet 12/12/2023 -- Joni Hamilton PA-C    potassium bicarbonate (K-LYTE) disintegrating tablet Take 1 tablet (25 mEq total) by mouth once daily. for 3 days 3 tablet 12/12/2023 12/15/2023 Joni Hamilton PA-C          Follow-up Information       Follow up With Specialties Details Why Contact Info    [facility] -  Schedule an appointment as soon as possible for a visit in 1 day For re-evaluation [address] [place] [address]  [phone]      [facility] [place] Emergency [place] Go to  If symptoms worsen or new symptoms develop [address] [facility] [place] [id number] [phone]             Joni Hamilton PA-C  12/12/23 0432

## 2023-12-12 NOTE — DISCHARGE INSTRUCTIONS
Thank you for coming to our Emergency Department today. It is important to remember that some problems or medical conditions are difficult to diagnose and may not be found or addressed during your Emergency Department visit.  These conditions often start with non-specific symptoms and can only be diagnosed on follow up visits with your primary care physician or specialist when the symptoms continue or change. Please remember that all medical conditions can change, and we cannot predict how you will be feeling tomorrow or the next day. Return to the ER with any questions/concerns, new/concerning symptoms, worsening or failure to improve.       Be sure to follow up with your primary care doctor and review all labs/imaging/tests that were performed during your ER visit with them. It is very common for us to identify non-emergent incidental findings which must be followed up with your primary care physician.  Some labs/imaging/tests may be outside of the normal range, and require non-emergent follow-up and/or further investigation/treatment/procedures/testing to help diagnose/exclude/prevent complications or other potentially serious medical conditions. Some abnormalities may not have been discussed or addressed during your ER visit.     An ER visit does not replace a primary care visit, and many screening tests or follow-up tests cannot be ordered by an ER doctor or performed by the ER. Some tests may even require pre-approval.    If you do not have a primary care doctor, you may contact the one listed on your discharge paperwork or you may also call the [facility] Appointment Desk at [phone] , or [address] [place] [address] at  [phone] to schedule an appointment, or establish care with a primary care doctor or even a specialist and to obtain information about local resources. It is important to your health that you have a primary care doctor.    Please take all medications as directed. We have done our best to select  a medication for you that will treat your condition however, all medications may potentially have side-effects and it is impossible to predict which medications may give you side-effects or what those side-effects (if any) those medications may give you.  If you feel that you are having a negative effect or side-effect of any medication you should stop taking those medications immediately and seek medical attention. If you feel that you are having a life-threatening reaction call 911.        Do not drive, swim, climb to height, take a bath, operate heavy machinery, drink alcohol or take potentially sedating medications, sign any legal documents or make any important decisions for 24 hours if you have received any pain medications, sedatives or mood altering drugs during your ER visit or within 24 hours of taking them if they have been prescribed to you.     You can find additional resources for Dentists, hearing aids, durable medical equipment, low cost pharmacies and other resources at [email or web address]

## 2024-01-15 PROBLEM — N18.9 ACUTE KIDNEY INJURY SUPERIMPOSED ON CHRONIC KIDNEY DISEASE: Status: RESOLVED | Noted: 2023-09-04 | Resolved: 2024-01-15

## 2024-01-15 PROBLEM — N17.9 ACUTE KIDNEY INJURY SUPERIMPOSED ON CHRONIC KIDNEY DISEASE: Status: RESOLVED | Noted: 2023-09-04 | Resolved: 2024-01-15

## 2024-03-06 ENCOUNTER — PATIENT OUTREACH (OUTPATIENT)
Dept: ADMINISTRATIVE | Facility: OTHER | Age: 49
End: 2024-03-06
Payer: MEDICAID

## 2024-03-08 NOTE — PROGRESS NOTES
[facility] - Unable to Contact    LPN to close episode at this time due to three missed attempts for patient contact.

## 2024-06-10 NOTE — ED NOTES
Bed: 11main  Expected date:   Expected time:   Means of arrival:   Comments:  EMS     Please contact pt to schedule appt for dietitian.